# Patient Record
Sex: MALE | Race: WHITE | NOT HISPANIC OR LATINO | Employment: OTHER | URBAN - METROPOLITAN AREA
[De-identification: names, ages, dates, MRNs, and addresses within clinical notes are randomized per-mention and may not be internally consistent; named-entity substitution may affect disease eponyms.]

---

## 2017-04-07 ENCOUNTER — GENERIC CONVERSION - ENCOUNTER (OUTPATIENT)
Dept: OTHER | Facility: OTHER | Age: 72
End: 2017-04-07

## 2017-04-21 ENCOUNTER — ALLSCRIPTS OFFICE VISIT (OUTPATIENT)
Dept: OTHER | Facility: OTHER | Age: 72
End: 2017-04-21

## 2017-04-21 DIAGNOSIS — M54.9 DORSALGIA: ICD-10-CM

## 2017-04-21 DIAGNOSIS — E78.00 PURE HYPERCHOLESTEROLEMIA: ICD-10-CM

## 2017-06-15 ENCOUNTER — ALLSCRIPTS OFFICE VISIT (OUTPATIENT)
Dept: OTHER | Facility: OTHER | Age: 72
End: 2017-06-15

## 2017-06-15 DIAGNOSIS — M41.9 SCOLIOSIS: ICD-10-CM

## 2017-06-15 DIAGNOSIS — N40.0 ENLARGED PROSTATE WITHOUT LOWER URINARY TRACT SYMPTOMS (LUTS): ICD-10-CM

## 2017-06-15 DIAGNOSIS — E78.00 PURE HYPERCHOLESTEROLEMIA: ICD-10-CM

## 2017-12-05 ENCOUNTER — ALLSCRIPTS OFFICE VISIT (OUTPATIENT)
Dept: OTHER | Facility: OTHER | Age: 72
End: 2017-12-05

## 2017-12-05 DIAGNOSIS — R73.09 OTHER ABNORMAL GLUCOSE: ICD-10-CM

## 2017-12-05 DIAGNOSIS — E78.00 PURE HYPERCHOLESTEROLEMIA: ICD-10-CM
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
## 2017-12-06 NOTE — PROGRESS NOTES
Assessment    1  Benign essential HTN (401 1) (I10)   2  Hypercholesterolemia (272 0) (E78 00)   3  Gastroesophageal reflux disease without esophagitis (530 81) (K21 9)   4  Tick bite, initial encounter (919 4,E906 4) (W57 XXXA)   5  Abnormal glucose (790 29) (R73 09)   6  JUAN (acute kidney injury) (584 9) (N17 9)   7  BPH (benign prostatic hyperplasia) (600 00) (N40 0)    Plan  Abnormal glucose    · (1) COMPREHENSIVE METABOLIC PANEL; Status:Active; Requested for:83Uoh6791;    · (1) HEMOGLOBIN A1C; Status:Active; Requested for:59Yge8012;   BPH (benign prostatic hyperplasia)    · Tamsulosin HCl - 0 4 MG Oral Capsule; 1 tablet at bedtime  Hypercholesterolemia    · Carvedilol 25 MG Oral Tablet; TAKE 1 TABLET TWICE DAILY,  WITH MORNING AND EVENINGMEAL   · (1) CK (CPK); Status:Active; Requested for:29Neh0860;    · (1) LIPID PANEL FASTING W DIRECT LDL REFLEX; Status:Active; Requested for:93Utl4056;    · Follow-up visit in 4 Months Evaluation and Treatment  Follow-up  Status: Hold For - Scheduling Requested for: 57VIL9992  PMH: Abdominal bloating, PMH: History of abdominal pain    · Pantoprazole Sodium 40 MG Oral Tablet Delayed Release (Protonix); TAKE 1 TABLET(S) BYMOUTH DAILY  Screening for neurological condition    · *VB - Fall Risk Assessment  (Dx Z13 89 Screen for Neurologic Disorder) ; every 1 year; [id number]; Next 04HKZ0072; Status:Active   · Falls Risk Assessment (Dx Z13 89 Screen for Neurologic Disorder) ; every 1 year; Last 15Jun2017;Next 65UNV3960; Status:Active  Tick bite, initial encounter    · (1) LYME ANTIBODY PROFILE W/REFLEX TO WESTERN BLOT; Status:Active; Requestedfor:18Wey5460;     Discussion/Summary  Discussion Summary:   Increase water intake, start flomax for better urine flow  avoid ibuprofen- ok to use tylenol  check labs for lyme  repeat labs in 4months, discussed increased water intake  cont with cardiology and urology appt     Counseling Documentation With Imm: The patient was counseled regarding diagnostic results  Chief Complaint  Chief Complaint Free Text Note Form: Patient is here for a 6 m f/u visit for hypertension, hyperlipidemia and GERD  Review labs  Refill Pantoprazole  He c/o finding a tick under his left arm and the left side of his torso 3 weeks ago  He still has raised marks on his skin from the bites  He also c/o difficulty sleeping due to his arms falling asleep during the night and both knees ache  He has difficulty straightening up in the morning, back is stiff  Chief Complaint Chronic Condition [facility]: Patient is here today for follow up of chronic conditions described in HPI  History of Present Illness  Hyperlipidemia (Follow-Up): The patient states his hyperlipidemia has been stable since the last visit  Comorbid Illnesses: hypertension  He has no significant interval events  Symptoms: The patient is currently asymptomatic  Associated symptoms include no focal neurologic deficits  Medications: The patient is not currently on any medications for his hyperlipidemia  The patient is not doing well with his hyperlipidemia goals  the patient's LDL goal is 80 mg/dL  The patient is due for a lipid panel  Hypertension (Follow-Up): The patient presents for follow-up of essential hypertension  The patient states he has been doing well with his blood pressure control since the last visit  He has no comorbid illnesses  He has no significant interval events  Symptoms: The patient is currently asymptomatic  Associated symptoms include no headache  Medications: The patient is not currently on any medications for his hypertension--lifestyle modification only  Disease Management: the patient is not doing well with his blood pressure goals  Coronary Artery Disease (Follow-Up): The patient states he has been doing well with his coronary artery disease symptoms since the last visit  Comorbid Illnesses: hypertension   Complications: MI    Symptoms: The patient is currently asymptomatic  His symptoms do not limit his activities  He is able to walk about 2 to 3 flights of stairs without symptoms  He denies nitroglycerin use since the last visit  Medications: The patient is not currently on any medications for his coronary artery disease  Review of Systems  Complete-Male:  Constitutional: recent 7 lb weight loss, but-- no fever,-- no recent weight gain,-- no chills-- and-- not feeling tired  Eyes: no eyesight problems  ENT: allergies, but-- no nasal discharge  Cardiovascular: no chest pain,-- no palpitations-- and-- no extremity edema  Respiratory: no cough-- and-- no shortness of breath during exertion  Gastrointestinal: reflux, but-- as noted in HPI-- and-- no abdominal pain  Genitourinary: no dysuria-- and-- no incontinence  Musculoskeletal: arthralgias-- and-- myalgias, but-- as noted in HPI  Neurological: no headache-- and-- no dizziness  Psychiatric: sleep disturbances-- and-- recent frequently awakenings, but-- as noted in HPI  Active Problems  1  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 (414 05) (I25 810)   2  Benign essential HTN (401 1) (I10)   3  BPH (benign prostatic hyperplasia) (600 00) (N40 0)   4  Depression (311) (F32 9)   5  Encounter for hepatitis C screening test for low risk patient (V73 89) (Z11 59)   6  Gastroesophageal reflux disease without esophagitis (530 81) (K21 9)   7  Gout (274 9) (M10 9)   8  Hypercholesterolemia (272 0) (E78 00)   9  Lumbar spondylosis (721 3) (M47 816)   10  Scoliosis (737 30) (M41 9)   11  Screening for genitourinary condition (V81 6) (Z13 89)   12  Screening for neurological condition (V80 09) (Z13 89)    Past Medical History  1  History of Abdominal bloating (787 3) (R14 0)   2  History of Acute back pain (724 5) (M54 9)   3  History of Basal cell carcinoma of back (173 51) (C44 519)   4   History of Basal cell carcinoma of skin of upper extremity, including shoulder (173 61) (C44 611)   5  History of BCC (basal cell carcinoma), trunk (173 51) (C44 519)   6  History of BCE (basal cell epithelioma), trunk (173 51) (C44 519)   7  History of Benign neoplasm of skin of face (216 3) (D23 30)   8  H/O nonmelanoma skin cancer (V10 83) (Z85 828)   9  History of abdominal pain (V13 89) (Z87 898)   10  History of actinic keratosis (V13 3) (Z87 2)   11  History of acute gouty arthritis (V13 4) (Z87 39)   12  History of neoplasm of uncertain behavior of skin (V13 3) (Z86 03)   13  History of seborrheic keratosis (V13 3) (Z87 2)   14  History of Inflamed seborrheic keratosis (702 11) (L82 0)   15  History of Skin lesion (709 9) (L98 9)    Surgical History  1  History of Anal Fistulotomy (Subcutaneous)   2  History of Coronary Artery Triple Arterial Bypass Graft   3  History of Hemorrhoidectomy   4  History of Reported Hx Of Shoulder Joint Replacement    Family History  Mother    1  Family history of Brain tumor    Social History     · Never A Smoker   · No illicit drug use   · Social alcohol use (Z78 9)  Social History Reviewed: The social history was reviewed and updated today  The social history was reviewed and is unchanged  Current Meds   1  Aspirin 325 MG Oral Tablet; take 1 tablet every other day; Therapy: (Recorded:06Jan2016) to Recorded   2  Carvedilol 25 MG Oral Tablet; TAKE 1 TABLET TWICE DAILY,  WITH MORNING AND EVENING MEAL; Therapy: 65NSO5257 to (Evaluate:80Ieu8400)  Requested for: 23QHM2053; Last Rx:15Jun2017 Ordered   3  Pantoprazole Sodium 40 MG Oral Tablet Delayed Release; TAKE 1 TABLET(S) BY MOUTH DAILY; Therapy: 11QHH1533 to (Last Rx:15Jun2017)  Requested for: 77KNK6863 Ordered   4  Rosuvastatin Calcium 10 MG Oral Tablet; TAKE ONE TABLET BY MOUTH EVERY DAY; Therapy: 15Nwb0057 to (Katheryn Hernandez)  Requested for: 20Nov2017; Last Rx:20Nov2017 Ordered  Medication List Reviewed: The medication list was reviewed and updated today  Allergies  1  No Known Drug Allergies  2  No Known Environmental Allergies   3  No Known Food Allergies    Vitals  Vital Signs    Recorded: 52WCH1631 02:01PM   Temperature 96 8 F, Tympanic   Heart Rate 53   Systolic 405, LUE, Sitting   Diastolic 90, LUE, Sitting   Height 5 ft 2 36 in   Weight 191 lb 6 oz   BMI Calculated 34 6   BSA Calculated 1 88   O2 Saturation 97       Physical Exam   Constitutional  General appearance: No acute distress, well appearing and well nourished  Eyes  Conjunctiva and lids: No swelling, erythema, or discharge  Pupils and irises: Equal, round and reactive to light  Ears, Nose, Mouth, and Throat  External inspection of ears and nose: Normal    Oropharynx: Normal with no erythema, edema, exudate or lesions  Pulmonary  Respiratory effort: No increased work of breathing or signs of respiratory distress  Auscultation of lungs: Clear to auscultation, equal breath sounds bilaterally, no wheezes, no rales, no rhonci  Cardiovascular  Auscultation of heart: Normal rate and rhythm, normal S1 and S2, without murmurs  Examination of extremities for edema and/or varicosities: Normal    Carotid pulses: Normal    Abdomen  Abdomen: Non-tender, no masses  Liver and spleen: No hepatomegaly or splenomegaly  Musculoskeletal  Gait and station: Normal    Inspection/palpation of joints, bones, and muscles: Abnormal  -- lumbar paraspinal hypertonicity  no bony pain  Skin  Skin and subcutaneous tissue: Normal without rashes or lesions  Neurologic  Cranial nerves: Cranial nerves 2-12 intact  Reflexes: 2+ and symmetric  Sensation: No sensory loss     Psychiatric  Orientation to person, place and time: Normal    Mood and affect: Normal          Results/Data  (1) COMPREHENSIVE METABOLIC PANEL 42KAR3071 33:68JV Florecita Burris     Test Name Result Flag Reference   GLUCOSE,RANDM 117 A        Summary / No summary entered :    No summary entered  Documents attached :    [address] Work - [facility], team; Enc: [id number] - Image Encounter - [facility], team -    (Interventional Medicine) (Additional Information Document)  Health Management  Depression screening   PHevery-9 Adult Depression Screening; every 1 year; Last 33VLK6541; Next Due: 23Pov9120; Active  History of Colon cancer screening   COLONOSCOPY; every 5 years; Last 45Wnm3699; Next Due: 03Taz2535; Active  Screening for genitourinary condition   *VB - Urinary Incontinence Screen (Dx Z13 89 Screen for UI); every 1 year; Last 67NYY1015; NextDue: 70AIA2374; Active  Screening for neurological condition   *VB - Fall Risk Assessment  (Dx Z13 89 Screen for Neurologic Disorder); every 1 year; DUQT74VEX1134; Next Due: 01BOS2268; Active  Falls Risk Assessment (Dx Z13 89 Screen for Neurologic Disorder); every 1 year; Last 07KZE5078;[facility] Due: 06RCU7026; Active  Health Maintenance   Medicare Annual Wellness Visit; every 1 year; Last 50ECY8817; Next Due: 18EGL5545;  Overdue    Signatures   Electronically signed by : Mauricio Harley DO; Dec  5 2017  2:32PM EST                       (Author)

## 2018-01-13 VITALS
SYSTOLIC BLOOD PRESSURE: 158 MMHG | WEIGHT: 198.25 LBS | BODY MASS INDEX: 35.12 KG/M2 | OXYGEN SATURATION: 97 % | HEIGHT: 63 IN | HEART RATE: 58 BPM | DIASTOLIC BLOOD PRESSURE: 90 MMHG | TEMPERATURE: 98.2 F

## 2018-01-14 VITALS — DIASTOLIC BLOOD PRESSURE: 66 MMHG | HEIGHT: 63 IN | SYSTOLIC BLOOD PRESSURE: 130 MMHG

## 2018-01-19 ENCOUNTER — ALLSCRIPTS OFFICE VISIT (OUTPATIENT)
Dept: OTHER | Facility: OTHER | Age: 73
End: 2018-01-19

## 2018-01-19 DIAGNOSIS — I10 ESSENTIAL (PRIMARY) HYPERTENSION: ICD-10-CM

## 2018-01-19 DIAGNOSIS — E78.00 PURE HYPERCHOLESTEROLEMIA: ICD-10-CM

## 2018-01-19 DIAGNOSIS — I25.810 ATHEROSCLEROSIS OF CORONARY ARTERY BYPASS GRAFT WITHOUT ANGINA PECTORIS: ICD-10-CM

## 2018-01-20 NOTE — PROGRESS NOTES
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, Benign    essential HTN, Hypercholesterolemia    · VAS SCREENING; Status:Hold For - Scheduling; Requested for:19Jan2018; Perform:[facility]; XHL:37GZM8598;MFVBBHZ; For: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, Benign essential HTN, Hypercholesterolemia; Ordered By:Jake Gottlieb;   · Follow-up visit in 6 months Evaluation and Treatment  Follow-up  Status: Hold For -    Scheduling  Requested for: 85VUL4944   Ordered; For: 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, Benign essential HTN, Hypercholesterolemia; Ordered By: David Maguire Performed:  Due: 86WZB3005    Discussion/Summary   Cardiology Discussion Summary Free Text Note Form [facility]:    Coronary artery disease is stable with no complaints of angina  Blood pressure is elevated I have asked him to take his carvedilol twice a day and follow the pressure closely  It does not improved can add amlodipine 5 mg daily  Cholesterol numbers will be checked this year  Continue current dose of rosuvastatin I have encouraged him to again take these pills on a daily basis  I have ordered a vascular screening of see him back in 6 months  Chief Complaint   Chief Complaint Free Text Note Form: Patient is here for a yearly follow up  Patient has no cardiac complaint  History of Present Illness   Cardiology HPI Free Text Note Form [facility]: Mr Wil Rosado a very pleasant 73-year-old gentleman with a history of coronary artery disease status post CABG Ã3 10 years ago presents today for a follow-up visit  From a functional standpoint the patient does quite well   He works at [employer] 4 days a week and walks on the treadmill for a couple of miles  He declines any exertional chest pressure heaviness  He states his breathing has been quite good and offers no complaints of palpitations  He unfortunately has a hard time sleeping at night states that he gets him multiple times but declines any PND orthopnea  He stopped taking his Crestor around December 2 to cost  I explained to him today in the office that he recently went generic and he'll be able to get back on that  His LDL cholesterol did rise about 60 0 6 while off of the medication  presents today for follow-up visit  I have not seen him since 2016  he states he has been doing well and doing some exercise and working with no significant limitations  There has been no chest pain, shortness of breath, palpitations, lightheadedness, dizziness, or syncope  He has been taking his cholesterol medicine most days of the week and numbers have significantly improved based on blood work from last year  He tries to follow a low-sodium diet  Blood pressures been a little on the high side  He admits to not taking his carvedilol twice a day and has a hard time remembering to take pills  Review of Systems   Cardiology Male ROS:         Cardiac: No complaints of chest pain, no palpitations, no fainiting  Skin: No complaints of nonhealing sores or skin rash  Genitourinary: No complaints of recurrent urinary tract infections, frequent urination at night, difficult urination, blood in urine, kidney stones, loss of bladder control, no kidney or prostate problems, no erectile dysfunction  Psychological: No complaints of feeling depressed, anxiety, panic attacks, or difficulty concentrating  General: No complaints of trouble sleeping, lack of energy, fatigue, appetite changes, weight changes, fever, frequent infections, or night sweats  Respiratory: No complaints of shortness of breath, cough with sputum, or wheezing        HEENT: No complaints of serious problems, hearing problems, nose problems, throat problems, or snoring  Gastrointestinal: No complaints of liver problems, nausea, vomiting, heartburn, constipation, bloody stools, diarrhea, problems swallowing, adbominal pain, or rectal bleeding  Hematologic: No complaints of bleeding disorders, anemia, blood clots, or excessive brusing  Neurological: No complaints of numbness, tingling, dizziness, weakness, seizures, headaches, syncope or fainting, AM fatigue, daytime sleepiness, no witnessed apnea episodes  Musculoskeletal: No complaints of arthritis, back pain, or painfull swelling  Active Problems   Problems    1  Abnormal glucose (790 29) (R73 09)   2  JUAN (acute kidney injury) (584 9) (N17 9)   3  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 (414 05)     (I25 810)   4  Benign essential HTN (401 1) (I10)   5  BPH (benign prostatic hyperplasia) (600 00) (N40 0)   6  Depression (311) (F32 9)   7  Encounter for hepatitis C screening test for low risk patient (V73 89) (Z11 59)   8  Gastroesophageal reflux disease without esophagitis (530 81) (K21 9)   9  Gout (274 9) (M10 9)   10  Hypercholesterolemia (272 0) (E78 00)   11  Lumbar spondylosis (721 3) (M47 816)   12  Scoliosis (737 30) (M41 9)   13  Screening for genitourinary condition (V81 6) (Z13 89)   14  Screening for neurological condition (V80 09) (Z13 89)   15  Tick bite, initial encounter (919 4,E906 4) [facility])    Past Medical History   Problems    1  History of Abdominal bloating (787 3) (R14 0)   2  History of Acute back pain (724 5) (M54 9)   3  History of Basal cell carcinoma of back (173 51) (C44 519)   4  History of Basal cell carcinoma of skin of upper extremity, including shoulder (173 61)     (C44 611)   5  History of BCC (basal cell carcinoma), trunk (173 51) (C44 519)   6  History of BCE (basal cell epithelioma), trunk (173 51) (C44 519)   7   History of Benign neoplasm of skin of face (216 3) (D23 30)   8  H/O nonmelanoma skin cancer (V10 83) (Z85 828)   9  History of abdominal pain (V13 89) (Z87 898)   10  History of actinic keratosis (V13 3) (Z87 2)   11  History of acute gouty arthritis (V13 4) (Z87 39)   12  History of neoplasm of uncertain behavior of skin (V13 3) (Z86 03)   13  History of seborrheic keratosis (V13 3) (Z87 2)   14  History of Inflamed seborrheic keratosis (702 11) (L82 0)   15  History of Skin lesion (709 9) (L98 9)  Active Problems And Past Medical History Reviewed: The active problems and past medical history were reviewed and updated today  Surgical History   Problems    1  History of Anal Fistulotomy (Subcutaneous)   2  History of Coronary Artery Triple Arterial Bypass Graft   3  History of Hemorrhoidectomy   4  History of Reported Hx Of Shoulder Joint Replacement  Surgical History Reviewed: The surgical history was reviewed and updated today  Family History   Mother    1  Family history of Brain tumor  Family History Reviewed: The family history was reviewed and updated today  Social History   Problems    · Never A Smoker   · No illicit drug use   · Social alcohol use (Z78 9)  Social History Reviewed: The social history was reviewed and updated today  Current Meds    1  Aspirin 325 MG Oral Tablet; take 1 tablet every other day; Therapy: (Recorded:06Jan2016) to Recorded   2  Carvedilol 25 MG Oral Tablet; TAKE 1 TABLET TWICE DAILY,  WITH MORNING AND     EVENING MEAL; Therapy: 38GQF4878 to (Evaluate:03Jun2018)  Requested for: 90JYZ1320; Last     Rx:22Iat7430 Ordered   3  Pantoprazole Sodium 40 MG Oral Tablet Delayed Release; TAKE 1 TABLET(S) BY     MOUTH DAILY; Therapy: 62MQY1831 to (Last Rx:41Cyg3222)  Requested for: 94XHZ5669 Ordered   4  Rosuvastatin Calcium 10 MG Oral Tablet; TAKE ONE TABLET BY MOUTH EVERY DAY; Therapy: 38ZWR6834 to (Evaluate:66Xsk4600)  Requested for: 97XMX6175; Last     Rx:41Txq7294 Ordered   5  Tamsulosin HCl - 0 4 MG Oral Capsule; 1 tablet at bedtime; Therapy: 47MOJ8853 to (Last Rx:76Uqx0320)  Requested for: 21Fmj3366 Ordered    Allergies   Medication    1  No Known Drug Allergies  Non-Medication    2  No Known Environmental Allergies   3  No Known Food Allergies    Vitals   Vital Signs    Recorded: 26XAK0747 10:24AM Recorded: 93KAJ1336 09:58AM   Heart Rate  51   Systolic 337 936, RUE, Sitting   Diastolic 86 90, RUE, Sitting   Height  5 ft 2 in   Weight  198 lb    BMI Calculated  36 21   BSA Calculated  1 9     Physical Exam        Constitutional      General appearance: No acute distress, well appearing and well nourished  Eyes      Conjunctiva and Sclera examination: Conjunctiva pink, sclera anicteric  Ears, Nose, Mouth, and Throat - Oropharynx: Clear, nares are clear, mucous membranes are moist       Neck      Neck and thyroid: Normal, supple, trachea midline, no thyromegaly  Pulmonary      Respiratory effort: No increased work of breathing or signs of respiratory distress  Auscultation of lungs: Clear to auscultation, no rales, no rhonchi, no wheezing, good air movement  Cardiovascular      Auscultation of heart: Normal rate and rhythm, normal S1 and S2, no murmurs  Carotid pulses: Normal, 2+ bilaterally  Peripheral vascular exam: Normal pulses throughout, no tenderness, erythema or swelling  Pedal pulses: Normal, 2+ bilaterally  Examination of extremities for edema and/or varicosities: Normal        Abdomen      Abdomen: Non-tender and no distention  Liver and spleen: No hepatomegaly or splenomegaly  Musculoskeletal Gait and station: Normal gait  -- Digits and nails: Normal without clubbing or cyanosis  -- Inspection/palpation of joints, bones, and muscles: Normal, ROM normal        Skin - Skin and subcutaneous tissue: Normal without rashes or lesions  Skin is warm and well perfused, normal turgor         Neurologic - Cranial nerves: II - XII intact  -- Speech: Normal        Psychiatric - Orientation to person, place, and time: Normal -- Mood and affect: Normal       Results/Data   ECG Report: Normal sinus rhythm inferior infarct age indeterminate nonspecific T-wave changes      Health Management   Depression screening   PHevery-9 Adult Depression Screening; every 1 year; Last 56MQU8409; Next Due: 15Jun2018; Active  History of Colon cancer screening   COLONOSCOPY; every 5 years; Last 84Lzz1448; Next Due: 76You0076; Active  Screening for genitourinary condition   *VB - Urinary Incontinence Screen (Dx Z13 89 Screen for UI); every 1 year; Last 85RRS0013; Next    Due: 15Jun2018; Active  Screening for neurological condition   *VB - Fall Risk Assessment  (Dx Z13 89 Screen for Neurologic Disorder); every 1 year; Last    28SZV9207; Next Due: 86LZH8985; Active  Falls Risk Assessment (Dx Z13 89 Screen for Neurologic Disorder); every 1 year; Last 89DOE7046;    Next Due: 15Jun2018; Active  Health Maintenance   Medicare Annual Wellness Visit; every 1 year; Last 17CWD7667; Next Due: 06JYX7935;  Overdue    Future Appointments      Date/Time Provider Specialty Site   04/10/2018 10:15 AM Petra Joy DO Internal Medicine [facility]     Signatures    Electronically signed by : Owen Tillman DO; Jan 19 2018 10:25AM EST                       (Author)

## 2018-01-23 VITALS
HEIGHT: 62 IN | BODY MASS INDEX: 36.44 KG/M2 | DIASTOLIC BLOOD PRESSURE: 86 MMHG | WEIGHT: 198 LBS | HEART RATE: 51 BPM | SYSTOLIC BLOOD PRESSURE: 146 MMHG

## 2018-01-23 VITALS
OXYGEN SATURATION: 97 % | TEMPERATURE: 96.8 F | WEIGHT: 191.38 LBS | HEIGHT: 62 IN | BODY MASS INDEX: 35.22 KG/M2 | SYSTOLIC BLOOD PRESSURE: 142 MMHG | DIASTOLIC BLOOD PRESSURE: 90 MMHG | HEART RATE: 53 BPM

## 2018-01-26 ENCOUNTER — HOSPITAL ENCOUNTER (OUTPATIENT)
Dept: NON INVASIVE DIAGNOSTICS | Facility: CLINIC | Age: 73
Discharge: HOME | End: 2018-01-26
Payer: COMMERCIAL

## 2018-01-26 DIAGNOSIS — I25.810 ATHEROSCLEROSIS OF CORONARY ARTERY BYPASS GRAFT WITHOUT ANGINA PECTORIS: ICD-10-CM

## 2018-01-26 DIAGNOSIS — I10 ESSENTIAL (PRIMARY) HYPERTENSION: ICD-10-CM

## 2018-01-26 DIAGNOSIS — E78.00 PURE HYPERCHOLESTEROLEMIA: ICD-10-CM

## 2018-01-26 PROCEDURE — 93880 EXTRACRANIAL BILAT STUDY: CPT | Performed by: SURGERY

## 2018-01-26 PROCEDURE — 93922 UPR/L XTREMITY ART 2 LEVELS: CPT | Performed by: SURGERY

## 2018-01-26 PROCEDURE — 93979 VASCULAR STUDY: CPT | Performed by: SURGERY

## 2018-02-27 VITALS
BODY MASS INDEX: 35.44 KG/M2 | HEART RATE: 74 BPM | WEIGHT: 200 LBS | DIASTOLIC BLOOD PRESSURE: 96 MMHG | HEIGHT: 63 IN | SYSTOLIC BLOOD PRESSURE: 142 MMHG

## 2018-02-27 DIAGNOSIS — R53.83 FATIGUE, UNSPECIFIED TYPE: ICD-10-CM

## 2018-02-27 DIAGNOSIS — M25.50 POLYARTHRALGIA: ICD-10-CM

## 2018-02-27 DIAGNOSIS — M79.10 MYALGIA: Primary | ICD-10-CM

## 2018-02-27 PROCEDURE — 99203 OFFICE O/P NEW LOW 30 MIN: CPT | Performed by: INTERNAL MEDICINE

## 2018-02-27 RX ORDER — PANTOPRAZOLE SODIUM 40 MG/1
1 TABLET, DELAYED RELEASE ORAL DAILY
COMMUNITY
Start: 2016-06-09 | End: 2018-10-16

## 2018-02-27 RX ORDER — CARVEDILOL 25 MG/1
1 TABLET ORAL 2 TIMES DAILY
COMMUNITY
Start: 2015-07-17 | End: 2018-09-06 | Stop reason: SDUPTHER

## 2018-02-27 RX ORDER — TAMSULOSIN HYDROCHLORIDE 0.4 MG/1
CAPSULE ORAL
Refills: 0 | COMMUNITY
Start: 2018-02-16 | End: 2018-10-16

## 2018-02-28 NOTE — PROGRESS NOTES
Assessment/Plan:  Assessment/Plan   Diagnoses and all orders for this visit:    Myalgia  -     Lyme Antibody Profile with reflex to WB; Future  -     Vitamin D 1,25 dihydroxy; Future  -     RF Screen w/ Reflex to Titer; Future  -     Sedimentation rate, automated; Future  -     C-reactive protein; Future  -     Creatine Kinase, Total; Future    Polyarthralgia  -     Lyme Antibody Profile with reflex to WB; Future  -     Vitamin D 1,25 dihydroxy; Future  -     RF Screen w/ Reflex to Titer; Future  -     Sedimentation rate, automated; Future  -     C-reactive protein; Future  -     Creatine Kinase, Total; Future    Fatigue, unspecified type  -     Lyme Antibody Profile with reflex to WB; Future  -     Vitamin D 1,25 dihydroxy; Future  -     RF Screen w/ Reflex to Titer; Future  -     Sedimentation rate, automated; Future  -     C-reactive protein; Future  -     Creatine Kinase, Total; Future      Although Mr Usha Weinstein has a history of osteoarthritis, his current clinical presentation is nonfocal   He with the presents with myalgia and polyarthralgia  Although he had a Lyme titer workup done in December him a patient did indicate that this was done within a week after his exposure to tick bite  I have ordered a repeat lyme titer because it has been well documented that Antibody seroconversion status post primarily but to further infection can be slow and may take 2-6 weeks  Have also ordered some other basic rheumatologic and inflammatory lab work to rule out inflammatory process  Furthermore, given his history of starting use, I have also ordered serum CK level  He'll follow up for reevaluation once the labs are completed  Patient was advised to call and return to the clinic sooner or go to the closest emergency room if he develops any symptom exacerbation  This document was recorded using voice recognition software and errors may be noted  Subjective:   Patient ID: Ghazala Dandy is a 74 y o  male     HPI    Mr Mya Thapa is a 67-year-old male who presents for initial evaluation of Generalized muscle aches/pain, multiple joint pain and joint stiffness Including his bilateral knees, upper extremities, back, neck, etc  She reports that his symptoms started last year  He also has history of be started on statin last year as well as history of tick bite last year  With regards to the tick bite, he is PMD reportedly did a Lyme titer within a week after patient was bitten by the tick  I reviewed his labs and the lyme titer done in 12/2017 is normal   Patient also has a known history of osteoarthritis  However, he states that the symptoms that different from his usual arthritis related symptoms  He denies any numbness or tingling  The following portions of the patient's history were reviewed and updated as appropriate: allergies, current medications, past family history, past medical history, past social history, past surgical history and problem list     Review of Systems   Constitutional: Negative  HENT: Negative  Eyes: Negative  Respiratory: Negative  Gastrointestinal: Negative for abdominal distention, abdominal pain and anal bleeding  Genitourinary: Negative for dysuria  Musculoskeletal:        As per history of present illness   Skin: Negative  Neurological: Negative  Objective:  Right Knee Exam     Comments:  No knee swelling or effusion noted  Slight bilateral joint hypertrophy noted  Posteromedial joint line noted bilaterally  Medial joint line noted bilaterally  Left is very minimally larger than the right calf  There is no palpable cords  Bilateral knee range of motion is significant for some pain production  Palpable mild myalgia noted both in the bilateral Thighs and upper extremity  Back Exam     Tenderness   The patient is experiencing no tenderness  Range of Motion   Back extension: Subjective stiffness with traumatic extension     Back flexion: End-stage forward flexion stiffness  Lateral Bend Right: normal   Lateral Bend Left: normal   Rotation Right: normal             Physical Exam   Constitutional: He is oriented to person, place, and time  He appears well-developed and well-nourished  HENT:   Head: Normocephalic and atraumatic  Eyes: Conjunctivae are normal    Neurological: He is alert and oriented to person, place, and time  Skin: Skin is warm and dry  Psychiatric: He has a normal mood and affect

## 2018-03-05 LAB
1,25(OH)2D3 SERPL-MCNC: 29.9 PG/ML (ref 19.9–79.3)
25(OH)D3+25(OH)D2 SERPL-MCNC: 21.4 NG/ML (ref 30–100)
B BURGDOR IGG+IGM SER-ACNC: <0.91 ISR (ref 0–0.9)
B BURGDOR IGM SER IA-ACNC: <0.8 INDEX (ref 0–0.79)
CK SERPL-CCNC: 63 U/L (ref 24–204)
CRP SERPL-MCNC: 1 MG/L (ref 0–4.9)
ERYTHROCYTE [SEDIMENTATION RATE] IN BLOOD BY WESTERGREN METHOD: 6 MM/HR (ref 0–30)
RHEUMATOID FACT SERPL-ACNC: <10 IU/ML (ref 0–13.9)

## 2018-03-14 ENCOUNTER — OFFICE VISIT (OUTPATIENT)
Dept: OBGYN CLINIC | Facility: OTHER | Age: 73
End: 2018-03-14
Payer: COMMERCIAL

## 2018-03-14 VITALS
BODY MASS INDEX: 36.44 KG/M2 | SYSTOLIC BLOOD PRESSURE: 116 MMHG | HEIGHT: 62 IN | WEIGHT: 198 LBS | DIASTOLIC BLOOD PRESSURE: 113 MMHG | HEART RATE: 62 BPM

## 2018-03-14 DIAGNOSIS — R53.83 OTHER FATIGUE: ICD-10-CM

## 2018-03-14 DIAGNOSIS — M25.50 POLYARTHRALGIA: ICD-10-CM

## 2018-03-14 DIAGNOSIS — R79.89 LOW VITAMIN D LEVEL: Primary | ICD-10-CM

## 2018-03-14 DIAGNOSIS — M79.10 MYALGIA: ICD-10-CM

## 2018-03-14 PROCEDURE — 99213 OFFICE O/P EST LOW 20 MIN: CPT | Performed by: INTERNAL MEDICINE

## 2018-03-14 RX ORDER — NAPROXEN 500 MG/1
500 TABLET ORAL 2 TIMES DAILY WITH MEALS
Qty: 30 TABLET | Refills: 0 | Status: SHIPPED | OUTPATIENT
Start: 2018-03-14 | End: 2018-04-12 | Stop reason: SDUPTHER

## 2018-03-14 RX ORDER — PREDNISONE 20 MG/1
20 TABLET ORAL DAILY
Qty: 6 TABLET | Refills: 0 | Status: SHIPPED | OUTPATIENT
Start: 2018-03-14 | End: 2018-05-10

## 2018-03-14 RX ORDER — ERGOCALCIFEROL 1.25 MG/1
50000 CAPSULE ORAL WEEKLY
Qty: 8 CAPSULE | Refills: 0 | Status: SHIPPED | OUTPATIENT
Start: 2018-03-14 | End: 2018-05-10

## 2018-03-14 NOTE — PROGRESS NOTES
Assessment/Plan:  Assessment/Plan   Diagnoses and all orders for this visit:    Low vitamin D level  -     ergocalciferol (VITAMIN D2) 50,000 units; Take 1 capsule (50,000 Units total) by mouth once a week    Polyarthralgia  -     naproxen (NAPROSYN) 500 mg tablet; Take 1 tablet (500 mg total) by mouth 2 (two) times a day with meals x7-10 days then decrease to 1 tab p o bid prn  With food thereafter for any residual pain  -     predniSONE 20 mg tablet; Take 1 tablet (20 mg total) by mouth daily x5 days then decrease to 1/2 tab on day 6 & 7    Myalgia  -     predniSONE 20 mg tablet; Take 1 tablet (20 mg total) by mouth daily x5 days then decrease to 1/2 tab on day 6 & 7    Other fatigue        Focal examination of his knee is significant for left knee joint line tenderness  He does have some mild crepitus  Otherwise, no other pertinent positive findings  His low back examination is significant for some discomfort with trunk extension and lateral flexion  Otherwise, no other pertinent positive findings  His lab work is normal with the exception of serum vitamin D level of 21  Therefore, I have started him on vitamin-D replacement of 50,000 international units x8 weeks  He will transition to 5000 international units daily once he completes the 50,000 international units  I will start him on prednisone 20 mg p  O  Daily x5 days  He will take half tablet on day 6 and day 7  Naproxen 500 mg p  O  B i d  With food times 7-10 days and then decrease to p o  B i d  With food p r n  Thereafter  I have given him for a total of 2 weeks  If he continues to have symptoms after  Completing the above medications, he will need to contact his PMD who may need to refer him to rheumatology for further evaluation and diagnostic workup  Subjective:   Patient ID: Ana Grimm is a 74 y o  male  HPI      Mansfield Libman for follow-up re-evaluation of his polymyalgia and polyarthralgia    During our previous encounter, his complaint was nonfocal   I ordered multiple basic labs including CRP, ESR, rheumatoid factor, Lyme titers, vitamin-D level, and  Serum CK level which he has completed  It will be reviewed today  On today's presentation, he reports that he is still having pain in all of his joints in as well as his low back  His left knee pain sometimes travel to the lower lateral aspect of his knee  He reports when he wakes up in the morning he takes Salima Ricks a while to get going  He denies any numbness or tingling  His wife accompanied him to today's visit and was present throughout this encounter  Review of Systems   Review of Systems   Constitutional: Negative  HENT: Negative  Eyes: Negative  Respiratory: Negative  Cardiovascular: Negative  Musculoskeletal:        As per history of present illness   Skin: Negative  Neurological: Negative   Psychiatric/Behavioral: Negative  Objective:  Ortho Exam   Slight bilateral joint hypertrophy noted  Posteromedial joint line noted bilaterally  The rest of the bilateral knee examination is normal   Mild end-stage bilateral/and and extension stiffness  Physical Exam   Constitutional: Oriented to person, place, and time  Well-developed and well-nourished  HENT:   Head: Normocephalic and atraumatic  Eyes: Conjunctivae are normal    Cardiovascular: Normal rate  Pulmonary/Chest: Effort normal    Neurological: Alert and oriented to person, place, and time  Skin: Skin is warm and dry  Psychiatric: Normal mood and affect  Serum lab work including ESR, CRP, Lyme titer, and rheumatoid factor are normal   Serum vitamin D level is 21

## 2018-04-12 ENCOUNTER — OFFICE VISIT (OUTPATIENT)
Dept: INTERNAL MEDICINE CLINIC | Facility: CLINIC | Age: 73
End: 2018-04-12
Payer: COMMERCIAL

## 2018-04-12 VITALS
HEIGHT: 63 IN | BODY MASS INDEX: 35.01 KG/M2 | SYSTOLIC BLOOD PRESSURE: 130 MMHG | HEART RATE: 55 BPM | DIASTOLIC BLOOD PRESSURE: 70 MMHG | OXYGEN SATURATION: 96 % | WEIGHT: 197.6 LBS | TEMPERATURE: 98.2 F

## 2018-04-12 DIAGNOSIS — R79.89 LOW VITAMIN D LEVEL: ICD-10-CM

## 2018-04-12 DIAGNOSIS — M54.50 LUMBAR PAIN: Primary | ICD-10-CM

## 2018-04-12 DIAGNOSIS — M41.125 ADOLESCENT IDIOPATHIC SCOLIOSIS OF THORACOLUMBAR REGION: ICD-10-CM

## 2018-04-12 DIAGNOSIS — M25.50 POLYARTHRALGIA: ICD-10-CM

## 2018-04-12 PROCEDURE — 99213 OFFICE O/P EST LOW 20 MIN: CPT | Performed by: FAMILY MEDICINE

## 2018-04-12 RX ORDER — METHOCARBAMOL 500 MG/1
500 TABLET, FILM COATED ORAL
Qty: 30 TABLET | Refills: 3 | Status: SHIPPED | OUTPATIENT
Start: 2018-04-12 | End: 2018-10-18 | Stop reason: SDUPTHER

## 2018-04-12 RX ORDER — NAPROXEN 500 MG/1
500 TABLET ORAL 2 TIMES DAILY PRN
Qty: 60 TABLET | Refills: 3 | Status: SHIPPED | OUTPATIENT
Start: 2018-04-12 | End: 2018-05-10

## 2018-04-12 RX ORDER — ROSUVASTATIN CALCIUM 10 MG/1
TABLET, COATED ORAL
Refills: 1 | COMMUNITY
Start: 2018-04-07 | End: 2018-11-01 | Stop reason: SDUPTHER

## 2018-04-12 NOTE — PROGRESS NOTES
Assessment/Plan:    Lumbar pain  Heart prn, robaxin at night prn , naprosyn in am prn with food  Massage and stretching and possible chiropractor  xrays reviewed  tarun cause is scoliosis         Problem List Items Addressed This Visit        Musculoskeletal and Integument    Adolescent idiopathic scoliosis of thoracolumbar region       Other    Polyarthralgia    Relevant Medications    naproxen (NAPROSYN) 500 mg tablet    Low vitamin D level    Lumbar pain - Primary     Heart prn, robaxin at night prn , naprosyn in am prn with food  Massage and stretching and possible chiropractor  xrays reviewed  tarun cause is scoliosis         Relevant Medications    methocarbamol (ROBAXIN) 500 mg tablet    Other Relevant Orders    Ambulatory referral to Chiropractic            Subjective:      Patient ID: Clarence Carrasco is a 74 y o  male  HPI  Pain started a few months ago, known hx of scoliosis, saw specialist who RX prednisone and  NSAID and vit D  xrays noted  Worse now  Feels tightness, lowe thoracic and lumbar area  Takes him 2 hours in the morning to feel totally mobile, then the day is better  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 [place]   NSAIDS and prednisone helped, had labs done    The following portions of the patient's history were reviewed and updated as appropriate: allergies, current medications, past family history, past medical history, past social history, past surgical history and problem list     Review of Systems    Constitutional:  Denies fever or chills   Eyes:  Denies change in visual acuity   HENT:  Denies nasal congestion or sore throat   Respiratory:  Denies cough or shortness of breath or wheezing  Cardiovascular:  Denies palpitations or chest pain  GI:  Denies abdominal pain, nausea, or vomiting  Integument:  Denies rash   Neurologic:  Denies headache or focal weakness        Objective:      /70 (BP Location: Left arm, Patient Position: Sitting, Cuff Size: Standard)   Pulse 55   Temp 98 2 °F (36 8 °C) (Oral)   Ht 5' 3" (1 6 m)   Wt 89 6 kg (197 lb 9 6 oz)   SpO2 96%   BMI 35 00 kg/m²          Physical Exam    Constitutional:  Well developed, well nourished, no acute distress, non-toxic appearance   Eyes:  PERRL, conjunctiva normal , non icteric sclera  HENT:  Atraumatic, oropharynx moist  Neck-  supple   Respiratory:  CTA b/l, normal breath sounds, no rales, no wheezing   Cardiovascular:  RRR, no murmurs, no LE edema b/l  GI:  Soft, nondistended, normal bowel sounds x 4, nontender, no organomegaly, no mass, no rebound, no guarding   Neurologic:  no focal deficits noted   Psychiatric:  Speech and behavior appropriate , AAO x 3    MS; flattened lordotic curve, S  Scoliosis at thoraco lumbar area,  Hypertonicity located paraspinal hypertonicity ttp,   No bony pain

## 2018-04-12 NOTE — ASSESSMENT & PLAN NOTE
Heart prn, robaxin at night prn , naprosyn in am prn with food  Massage and stretching and possible chiropractor  xrays reviewed   tarun cause is scoliosis

## 2018-04-23 LAB
ALBUMIN SERPL-MCNC: 4.3 G/DL (ref 3.5–4.8)
ALBUMIN/GLOB SERPL: 2 {RATIO} (ref 1.2–2.2)
ALP SERPL-CCNC: 55 IU/L (ref 39–117)
ALT SERPL-CCNC: 20 IU/L (ref 0–44)
AMBIG ABBREV DEFAULT: NORMAL
AST SERPL-CCNC: 19 IU/L (ref 0–40)
BILIRUB SERPL-MCNC: 0.5 MG/DL (ref 0–1.2)
BUN SERPL-MCNC: 16 MG/DL (ref 8–27)
BUN/CREAT SERPL: 13 (ref 10–24)
CALCIUM SERPL-MCNC: 9.1 MG/DL (ref 8.6–10.2)
CHLORIDE SERPL-SCNC: 103 MMOL/L (ref 96–106)
CHOLEST SERPL-MCNC: 155 MG/DL (ref 100–199)
CK SERPL-CCNC: 63 U/L (ref 24–204)
CO2 SERPL-SCNC: 24 MMOL/L (ref 18–29)
CREAT SERPL-MCNC: 1.25 MG/DL (ref 0.76–1.27)
GLOBULIN SER-MCNC: 2.1 G/DL (ref 1.5–4.5)
GLUCOSE SERPL-MCNC: 116 MG/DL (ref 65–99)
HBA1C MFR BLD HPLC: 6.2 %
HBA1C MFR BLD: 6.2 % (ref 4.8–5.6)
HDLC SERPL-MCNC: 62 MG/DL
LDLC SERPL CALC-MCNC: 64 MG/DL (ref 0–99)
POTASSIUM SERPL-SCNC: 4.9 MMOL/L (ref 3.5–5.2)
PROT SERPL-MCNC: 6.4 G/DL (ref 6–8.5)
SL AMB EGFR AFRICAN AMERICAN: 65 ML/MIN/1.73
SL AMB EGFR NON AFRICAN AMERICAN: 56 ML/MIN/1.73
SODIUM SERPL-SCNC: 141 MMOL/L (ref 134–144)
TRIGL SERPL-MCNC: 147 MG/DL (ref 0–149)

## 2018-05-01 DIAGNOSIS — R79.89 LOW VITAMIN D LEVEL: ICD-10-CM

## 2018-05-01 RX ORDER — ERGOCALCIFEROL 1.25 MG/1
CAPSULE ORAL
Qty: 8 CAPSULE | Refills: 0 | OUTPATIENT
Start: 2018-05-01

## 2018-05-01 NOTE — TELEPHONE ENCOUNTER
Pleasant 77y/o male with vitamin D insufficiency treated with vitamin D replacement  He'll need to f/u with his PMD to determine if he need additional Vitamin D supplementation for his vitamin insufficiency

## 2018-05-10 ENCOUNTER — OFFICE VISIT (OUTPATIENT)
Dept: INTERNAL MEDICINE CLINIC | Facility: CLINIC | Age: 73
End: 2018-05-10
Payer: COMMERCIAL

## 2018-05-10 VITALS
TEMPERATURE: 97.9 F | OXYGEN SATURATION: 94 % | WEIGHT: 197.4 LBS | HEART RATE: 74 BPM | HEIGHT: 63 IN | DIASTOLIC BLOOD PRESSURE: 84 MMHG | SYSTOLIC BLOOD PRESSURE: 128 MMHG | BODY MASS INDEX: 34.98 KG/M2

## 2018-05-10 DIAGNOSIS — M41.125 ADOLESCENT IDIOPATHIC SCOLIOSIS OF THORACOLUMBAR REGION: ICD-10-CM

## 2018-05-10 DIAGNOSIS — Z12.5 SCREENING FOR MALIGNANT NEOPLASM OF PROSTATE: ICD-10-CM

## 2018-05-10 DIAGNOSIS — E78.00 HYPERCHOLESTEROLEMIA: ICD-10-CM

## 2018-05-10 DIAGNOSIS — K21.9 GASTROESOPHAGEAL REFLUX DISEASE WITHOUT ESOPHAGITIS: ICD-10-CM

## 2018-05-10 DIAGNOSIS — R73.09 ABNORMAL GLUCOSE: ICD-10-CM

## 2018-05-10 DIAGNOSIS — R79.89 LOW VITAMIN D LEVEL: ICD-10-CM

## 2018-05-10 DIAGNOSIS — M25.50 POLYARTHRALGIA: ICD-10-CM

## 2018-05-10 DIAGNOSIS — M54.50 LUMBAR PAIN: ICD-10-CM

## 2018-05-10 DIAGNOSIS — I10 BENIGN ESSENTIAL HTN: Primary | ICD-10-CM

## 2018-05-10 PROBLEM — M41.9 SCOLIOSIS: Status: ACTIVE | Noted: 2017-07-11

## 2018-05-10 PROCEDURE — 99214 OFFICE O/P EST MOD 30 MIN: CPT | Performed by: FAMILY MEDICINE

## 2018-05-10 NOTE — PROGRESS NOTES
Assessment     1  Benign essential HTN (401 1) (I10)   2  Hypercholesterolemia (272 0) (E78 00)   3  Gastroesophageal reflux disease without esophagitis (530 81) (K21 9)   5  Abnormal glucose (790 29) (R73 09)   7  BPH (benign prostatic hyperplasia) (600 00) (N40 0)  adolescent scoliosis   osteoarthritis   degenerative joint disease     Plan  Abnormal glucose    · (1) COMPREHENSIVE METABOLIC PANEL   · (1) HEMOGLOBIN A1C;;   BPH (benign prostatic hyperplasia)    ·: Tamsulosin HCl - 0 4 MG Oral Capsule; 1 tablet at bedtime  Hypercholesterolemia    · : Carvedilol 25 MG Oral Tablet; TAKE 1 TABLET TWICE DAILY,  WITH MORNING AND EVENINGMEAL   · (1) CK (CPK);    · (1) LIPID PANEL FASTING W DIRECT LDL REFLEX;    · Follow-up visit in 6 Months Evaluation and Treatment  Follow-up       Discussion/Summary  Discussion Summary:   Keep Increased water intake, flomax for better urine flow  avoid ibuprofen- ok to use tylenol  check labs for lyme  repeat labs in 6 months, discussed increased water intake  cont with cardiology and urology appt  Has appointment with Orthopedics for neck and back pain  P r n  Naprosyn and Robaxin which   He already has  Counseling Documentation With Imm: The patient was counseled regarding diagnostic results  Chief Complaint  Chief Complaint Free Text Note Form: Patient is here for a 6 m f/u visit for hypertension, hyperlipidemia and GERD  Review labs  Still with back stiffness and occasionally uses Robaxin  Has an appointment with Ortho in July  Much better than when seen 1 month ago where he had an exacerbation  Chief Complaint Chronic Condition [facility]: Patient is here today for follow up of chronic conditions described in HPI  History of Present Illness  Hyperlipidemia (Follow-Up): The patient states his hyperlipidemia has been stable since the last visit  Comorbid Illnesses: hypertension  He has no significant interval events  Symptoms: The patient is currently asymptomatic  Associated symptoms include no focal neurologic deficits  Medications: The patient is not currently on any medications for his hyperlipidemia  The patient is not doing well with his hyperlipidemia goals  the patient's LDL goal is 80 mg/dL  The patient is due for a lipid panel  Hypertension (Follow-Up): The patient presents for follow-up of essential hypertension  The patient states he has been doing well with his blood pressure control since the last visit  He has no comorbid illnesses  He has no significant interval events  Symptoms: The patient is currently asymptomatic  Associated symptoms include no headache  Medications: The patient is not currently on any medications for his hypertension--lifestyle modification only  Disease Management: the patient is not doing well with his blood pressure goals  Coronary Artery Disease (Follow-Up): The patient states he has been doing well with his coronary artery disease symptoms since the last visit  Comorbid Illnesses: hypertension  Complications: MI    Symptoms: The patient is currently asymptomatic  His symptoms do not limit his activities  He is able to walk about 2 to 3 flights of stairs without symptoms  He denies nitroglycerin use since the last visit  Medications: The patient is not currently on any medications for his coronary artery disease  Review of Systems  Complete-Male:  Constitutional:  no fever,-- no recent weight gain,-- no chills-- and-- not feeling tired  Eyes: no eyesight problems  ENT: allergies, but-- no nasal discharge  Cardiovascular: no chest pain,-- no palpitations-- and-- no extremity edema  Respiratory: no cough-- and-- no shortness of breath during exertion  Gastrointestinal: reflux, but-- as noted in HPI-- and-- no abdominal pain  Genitourinary: no dysuria-- and-- no incontinence  Musculoskeletal: arthralgias-- and-- myalgias, but-- as noted in HPI  Neurological: no headache-- and-- no dizziness    Psychiatric: sleep disturbances-- and-- recent frequently awakenings, but-- as noted in HPI  Vitals:    05/10/18 1421   BP: 128/84   Pulse: 74   Temp: 97 9 °F (36 6 °C)   SpO2: 94%     Vital Signs         Physical Exam   Constitutional  General appearance: No acute distress, well appearing and well nourished  Eyes  Conjunctiva and lids: No swelling, erythema, or discharge  Pupils and irises: Equal, round and reactive to light  Ears, Nose, Mouth, and Throat  External inspection of ears and nose: Normal    Oropharynx: Normal with no erythema, edema, exudate or lesions  Pulmonary  Respiratory effort: No increased work of breathing or signs of respiratory distress  Auscultation of lungs: Clear to auscultation, equal breath sounds bilaterally, no wheezes, no rales, no rhonci  Cardiovascular  Auscultation of heart: Normal rate and rhythm, normal S1 and S2, without murmurs  Examination of extremities for edema and/or varicosities: Normal    Carotid pulses: Normal    Abdomen  Abdomen: Non-tender, no masses  Liver and spleen: No hepatomegaly or splenomegaly  Musculoskeletal  Gait and station: Normal    Inspection/palpation of joints, bones, and muscles: Abnormal  -- lumbar paraspinal hypertonicity  no bony pain  Skin  Skin and subcutaneous tissue: Normal without rashes or lesions  Neurologic  Cranial nerves: Cranial nerves 2-12 intact  Reflexes: 2+ and symmetric  Sensation: No sensory loss     Psychiatric  Orientation to person, place and time: Normal    Mood and affect: Normal

## 2018-09-06 DIAGNOSIS — E78.00 HYPERCHOLESTEROLEMIA: Primary | ICD-10-CM

## 2018-09-06 RX ORDER — CARVEDILOL 25 MG/1
25 TABLET ORAL 2 TIMES DAILY WITH MEALS
Qty: 60 TABLET | Refills: 1 | Status: SHIPPED | OUTPATIENT
Start: 2018-09-06 | End: 2018-12-04 | Stop reason: SDUPTHER

## 2018-10-16 ENCOUNTER — OFFICE VISIT (OUTPATIENT)
Dept: CARDIOLOGY CLINIC | Facility: CLINIC | Age: 73
End: 2018-10-16
Payer: COMMERCIAL

## 2018-10-16 VITALS
BODY MASS INDEX: 34.89 KG/M2 | SYSTOLIC BLOOD PRESSURE: 110 MMHG | DIASTOLIC BLOOD PRESSURE: 72 MMHG | HEIGHT: 63 IN | WEIGHT: 196.9 LBS | HEART RATE: 60 BPM

## 2018-10-16 DIAGNOSIS — E78.00 HYPERCHOLESTEROLEMIA: ICD-10-CM

## 2018-10-16 DIAGNOSIS — I10 BENIGN ESSENTIAL HTN: Primary | ICD-10-CM

## 2018-10-16 DIAGNOSIS — I25.10 CORONARY ARTERY DISEASE INVOLVING NATIVE CORONARY ARTERY OF NATIVE HEART WITHOUT ANGINA PECTORIS: ICD-10-CM

## 2018-10-16 LAB
ALBUMIN SERPL-MCNC: 4.2 G/DL (ref 3.5–4.8)
ALBUMIN/GLOB SERPL: 2 {RATIO} (ref 1.2–2.2)
ALP SERPL-CCNC: 53 IU/L (ref 39–117)
ALT SERPL-CCNC: 16 IU/L (ref 0–44)
AST SERPL-CCNC: 17 IU/L (ref 0–40)
BILIRUB SERPL-MCNC: 0.5 MG/DL (ref 0–1.2)
BUN SERPL-MCNC: 17 MG/DL (ref 8–27)
BUN/CREAT SERPL: 13 (ref 10–24)
CALCIUM SERPL-MCNC: 9 MG/DL (ref 8.6–10.2)
CHLORIDE SERPL-SCNC: 106 MMOL/L (ref 96–106)
CHOLEST SERPL-MCNC: 177 MG/DL (ref 100–199)
CK SERPL-CCNC: 49 U/L (ref 24–204)
CO2 SERPL-SCNC: 21 MMOL/L (ref 20–29)
CREAT SERPL-MCNC: 1.27 MG/DL (ref 0.76–1.27)
GLOBULIN SER-MCNC: 2.1 G/DL (ref 1.5–4.5)
GLUCOSE SERPL-MCNC: 126 MG/DL (ref 65–99)
HDLC SERPL-MCNC: 47 MG/DL
LABCORP COMMENT: NORMAL
LDLC SERPL CALC-MCNC: 94 MG/DL (ref 0–99)
POTASSIUM SERPL-SCNC: 4.3 MMOL/L (ref 3.5–5.2)
PROT SERPL-MCNC: 6.3 G/DL (ref 6–8.5)
PSA SERPL-MCNC: 2.7 NG/ML (ref 0–4)
SL AMB EGFR AFRICAN AMERICAN: 63 ML/MIN/1.73
SL AMB EGFR NON AFRICAN AMERICAN: 55 ML/MIN/1.73
SL AMB VLDL CHOLESTEROL CALC: 36 MG/DL (ref 5–40)
SODIUM SERPL-SCNC: 140 MMOL/L (ref 134–144)
TRIGL SERPL-MCNC: 181 MG/DL (ref 0–149)

## 2018-10-16 PROCEDURE — 99214 OFFICE O/P EST MOD 30 MIN: CPT | Performed by: INTERNAL MEDICINE

## 2018-10-16 NOTE — PROGRESS NOTES
Cardiology Follow Up    Jhonny Islas  1945  [id number]  [address] [facility] [address] [facility] [address] [place] [address] Road    1  Benign essential HTN     2  Hypercholesterolemia     3  Coronary artery disease involving native coronary artery of native heart without angina pectoris         Discussion/Summary:Coronary artery disease stable with no complaints of angina  Blood pressures been well controlled  Lipids have gone up because he stopped taking his Crestor about a month ago  He felt this was giving some musculoskeletal symptoms  He is going to start back again on 2 days a week  No further cardiac testing  Vascular screening was reviewed  Interval History:   74year-old gentle with a history of coronary artery disease status post CABG 10 years ago presents for a routine scheduled follow-up visit  He also has a history of hypertension, hyperlipidemia, mild carotid plaque  Overall he has been doing well  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  He is very active up and down stairs with no physical limitations  Denies any chest pain, shortness of breath, palpitations, lightheadedness, dizziness, or syncope  He has been taking all medications as prescribed  There is no lower extremity edema, PND, orthopnea  Crestor he stopped taking a month ago due to symptoms of muscle aches and numbness      Problem List     Myalgia    Polyarthralgia    Fatigue    Low vitamin D level    Lumbar pain    Adolescent idiopathic scoliosis of thoracolumbar region    Abnormal glucose    Benign essential HTN    BPH (benign prostatic hyperplasia)    Depression    Gastroesophageal reflux disease without esophagitis    Hypercholesterolemia    Scoliosis    Screening for malignant neoplasm of prostate    Coronary artery disease involving native coronary artery of native heart without angina pectoris        Past Medical History:   Diagnosis Date    Actinic keratosis     RESOLVED: [id number]    Adolescent idiopathic scoliosis of thoracolumbar region 4/12/2018    Basal cell carcinoma of back     RESOLVED: [id number]    Basal cell carcinoma of skin of upper extremity, including shoulder     BCC (basal cell carcinoma), trunk     BCE (basal cell epithelioma), trunk     RESOLVED: [id number]    Benign neoplasm of skin of face     Coronary artery disease involving native coronary artery of native heart without angina pectoris 10/16/2018    Depression 10/6/2015    Gastroesophageal reflux disease without esophagitis 4/21/2017    Hypertension     Neoplasm of uncertain behavior of skin     Nonmelanoma skin cancer     LAST ASSESSED: [id number]    Seborrheic keratosis     RESOLVED: [id number]     Social History     Social History    Marital status: /Civil Union     Spouse name: N/A    Number of children: N/A    Years of education: N/A     Occupational History    Not on file  Social History Main Topics    Smoking status: Never Smoker    Smokeless tobacco: Never Used    Alcohol use Yes      Comment: SOCIAL     Drug use: No    Sexual activity: Not on file     Other Topics Concern    Not on file     Social History Narrative    No narrative on file      Family History   Problem Relation Age of Onset    Other Mother         BRAIN TUMOR    No Known Problems Father     Vascular Disease Brother     Cancer Brother      Past Surgical History:   Procedure Laterality Date    ANAL FISTULOTOMY  06/13/2011    DR SANCHEZ    CORONARY ARTERY BYPASS GRAFT  05/17/2005    TRIPLE;   [facility] SURGERY  10/2009       [address] [place] [address] SURGERY  06/18/2015    JOINT REPLACEMENT; DR Arti Hardin       Current Outpatient Prescriptions:     aspirin 325 mg tablet, Take 1 tablet by mouth every other day, Disp: , Rfl:     carvedilol (COREG) 25 mg tablet, Take 1 tablet (25 mg total) by mouth 2 (two) times a day with meals, Disp: 60 tablet, Rfl: 1    methocarbamol (ROBAXIN) 500 mg tablet, Take 1 tablet (500 mg total) by mouth daily at bedtime as needed for muscle spasms, Disp: 30 tablet, Rfl: 3    rosuvastatin (CRESTOR) 10 MG tablet, , Disp: , Rfl: 1  No Known Allergies    Labs:     Chemistry        Component Value Date/Time     05/20/2016 1035     07/21/2015 0954    K 4 3 10/15/2018 0943     10/15/2018 0943    CO2 21 10/15/2018 0943    BUN 17 10/15/2018 0943    CREATININE 1 38 (H) 05/20/2016 1035    CREATININE 1 43 (H) 07/21/2015 0954        Component Value Date/Time    CALCIUM 8 4 05/20/2016 1035    CALCIUM 8 8 07/21/2015 0954    ALKPHOS 58 05/20/2016 1035    ALKPHOS 77 07/21/2015 0954    AST 16 05/20/2016 1035    AST 17 07/21/2015 0954    ALT 22 05/20/2016 1035    ALT 22 07/21/2015 0954    BILITOT 0 53 07/21/2015 0954            Lab Results   Component Value Date    CHOL 142 12/16/2015    CHOL 231 07/21/2015     Lab Results   Component Value Date    HDL 47 10/15/2018    HDL 62 04/20/2018    HDL 51 05/20/2016     Lab Results   Component Value Date    LDLCALC 121 (H) 05/20/2016    LDLCALC 53 12/16/2015    LDLCALC 143 (H) 07/21/2015     Lab Results   Component Value Date    TRIG 181 (H) 10/15/2018    TRIG 147 04/20/2018    TRIG 140 05/20/2016     No components found for: CHOLHDL    Imaging: No results found  ECG:        ROS    Vitals:    10/16/18 0844   BP: 110/72   Pulse: 60     Vitals:    10/16/18 0844   Weight: 89 3 kg (196 lb 14 4 oz)     Height: 5' 3" (160 cm)   Body mass index is 34 88 kg/m²  Physical Exam:   Vital signs reviewed    General appearance:  Appears stated age, alert, well appearing and in no distress  HEENT:  PERRLA, EOMI, no scleral icterus, no conjunctival pallor  NECK:  Supple, No elevated JVP, no thyromegaly, no carotid bruits  HEART:  Regular rate and rhythm, normal S1/S2, no S3/S4, no murmur or rub  LUNGS:  Clear to auscultation bilaterally, no wheezes rales or rhonchi  ABDOMEN:  Soft, non-tender, positive bowel sounds, no rebound or guarding, no organomegaly   EXTREMITIES:  No edema, normal range of motion  VASCULAR:  Normal pedal pulses, good pulse volume   SKIN: No lesions or rashes on exposed skin  NEURO:  CN II-XII intact, no focal deficits

## 2018-10-18 DIAGNOSIS — M54.50 LUMBAR PAIN: ICD-10-CM

## 2018-10-18 RX ORDER — METHOCARBAMOL 500 MG/1
500 TABLET, FILM COATED ORAL
Qty: 30 TABLET | Refills: 0 | Status: SHIPPED | OUTPATIENT
Start: 2018-10-18 | End: 2019-10-15 | Stop reason: ALTCHOICE

## 2018-11-01 ENCOUNTER — OFFICE VISIT (OUTPATIENT)
Dept: INTERNAL MEDICINE CLINIC | Facility: CLINIC | Age: 73
End: 2018-11-01
Payer: COMMERCIAL

## 2018-11-01 VITALS
OXYGEN SATURATION: 93 % | SYSTOLIC BLOOD PRESSURE: 132 MMHG | HEART RATE: 46 BPM | DIASTOLIC BLOOD PRESSURE: 70 MMHG | HEIGHT: 63 IN | BODY MASS INDEX: 34.59 KG/M2 | WEIGHT: 195.2 LBS | TEMPERATURE: 98.9 F

## 2018-11-01 DIAGNOSIS — R00.1 BRADYCARDIA: ICD-10-CM

## 2018-11-01 DIAGNOSIS — R79.89 LOW VITAMIN D LEVEL: ICD-10-CM

## 2018-11-01 DIAGNOSIS — M10.09 ACUTE IDIOPATHIC GOUT OF MULTIPLE SITES: ICD-10-CM

## 2018-11-01 DIAGNOSIS — R73.09 ABNORMAL GLUCOSE: Primary | ICD-10-CM

## 2018-11-01 DIAGNOSIS — E78.00 HYPERCHOLESTEROLEMIA: ICD-10-CM

## 2018-11-01 DIAGNOSIS — I10 BENIGN ESSENTIAL HTN: ICD-10-CM

## 2018-11-01 DIAGNOSIS — K21.9 GASTROESOPHAGEAL REFLUX DISEASE WITHOUT ESOPHAGITIS: ICD-10-CM

## 2018-11-01 PROBLEM — M10.9 GOUT OF MULTIPLE SITES: Status: ACTIVE | Noted: 2018-11-01

## 2018-11-01 PROCEDURE — 1036F TOBACCO NON-USER: CPT | Performed by: FAMILY MEDICINE

## 2018-11-01 PROCEDURE — 4040F PNEUMOC VAC/ADMIN/RCVD: CPT | Performed by: FAMILY MEDICINE

## 2018-11-01 PROCEDURE — 99214 OFFICE O/P EST MOD 30 MIN: CPT | Performed by: FAMILY MEDICINE

## 2018-11-01 PROCEDURE — 3078F DIAST BP <80 MM HG: CPT | Performed by: FAMILY MEDICINE

## 2018-11-01 PROCEDURE — 3075F SYST BP GE 130 - 139MM HG: CPT | Performed by: FAMILY MEDICINE

## 2018-11-01 PROCEDURE — 1101F PT FALLS ASSESS-DOCD LE1/YR: CPT | Performed by: FAMILY MEDICINE

## 2018-11-01 RX ORDER — ROSUVASTATIN CALCIUM 10 MG/1
10 TABLET, COATED ORAL
Qty: 30 TABLET | Refills: 5 | Status: SHIPPED | OUTPATIENT
Start: 2018-11-01 | End: 2018-11-27 | Stop reason: SDUPTHER

## 2018-11-01 RX ORDER — COLCHICINE 0.6 MG/1
0.6 TABLET ORAL 2 TIMES DAILY
Qty: 60 TABLET | Refills: 0 | Status: SHIPPED | OUTPATIENT
Start: 2018-11-01 | End: 2019-08-26 | Stop reason: SDUPTHER

## 2018-11-01 RX ORDER — INDOMETHACIN 50 MG/1
50 CAPSULE ORAL 2 TIMES DAILY WITH MEALS
Qty: 60 CAPSULE | Refills: 0 | Status: SHIPPED | OUTPATIENT
Start: 2018-11-01 | End: 2019-06-04 | Stop reason: SDUPTHER

## 2018-11-01 NOTE — PROGRESS NOTES
Assessment     1  Benign essential HTN (401 1) (I10)   2  Hypercholesterolemia (272 0) (E78 00)   3  Gastroesophageal reflux disease without esophagitis (530 81) (K21 9)   5  Abnormal glucose (790 29) (R73 09)   7  BPH (benign prostatic hyperplasia) (600 00) (N40 0)  adolescent scoliosis   osteoarthritis   degenerative joint disease     Plan  Abnormal glucose    · (1) COMPREHENSIVE METABOLIC PANEL   · (1) HEMOGLOBIN A1C;;   BPH (benign prostatic hyperplasia)    ·  Hypercholesterolemia    · : Carvedilol 25 MG Oral Tablet; TAKE 1 TABLET TWICE DAILY,  WITH MORNING AND EVENINGMEAL   · (1) CK (CPK);    · (1) LIPID PANEL FASTING W DIRECT LDL REFLEX;    · Follow-up visit in 6 Months Evaluation and Treatment  Follow-up       Discussion/Summary  Discussion Summary:   Keep Increased water intake, discussed weight and elevated sugar and possibly the need for medications for DM  avoid ibuprofen- ok to use tylenol  check labs for lyme  repeat labs in 6 months, discussed increased water intake  cont with cardiology and urology appt  Has appointment with Orthopedics for neck and back pain  P r n  Naprosyn and Robaxin which   He already has  Counseling Documentation With Imm: The patient was counseled regarding diagnostic results  Chief Complaint  Chief Complaint Free Text Note Form: Patient is here for a 6 m f/u visit for hypertension, hyperlipidemia and GERD  Review labs  Still with back stiffness and occasionally uses Robaxin  Has an appointment with Ortho   Chief Complaint Chronic Condition [facility]: Patient is here today for follow up of chronic conditions described in HPI  History of Present Illness  Hyperlipidemia (Follow-Up): The patient states his hyperlipidemia has been stable since the last visit  Comorbid Illnesses: hypertension  He has no significant interval events  Symptoms: The patient is currently asymptomatic  Associated symptoms include no focal neurologic deficits  Medications:  The patient is not currently on any medications for his hyperlipidemia  The patient is not doing well with his hyperlipidemia goals  the patient's LDL goal is 80 mg/dL  The patient is due for a lipid panel  Hypertension (Follow-Up): The patient presents for follow-up of essential hypertension  The patient states he has been doing well with his blood pressure control since the last visit  He has no comorbid illnesses  He has no significant interval events  Symptoms: The patient is currently asymptomatic  Associated symptoms include no headache  Medications: The patient is not currently on any medications for his hypertension--lifestyle modification only  Disease Management: the patient is not doing well with his blood pressure goals  Coronary Artery Disease (Follow-Up): The patient states he has been doing well with his coronary artery disease symptoms since the last visit  Comorbid Illnesses: hypertension  Complications: MI    Symptoms: The patient is currently asymptomatic  His symptoms do not limit his activities  He is able to walk about 2 to 3 flights of stairs without symptoms  He denies nitroglycerin use since the last visit  Medications: The patient is not currently on any medications for his coronary artery disease  Review of Systems  Complete-Male:  Constitutional:  no fever,-- no recent weight gain,-- no chills-- and-- not feeling tired  Eyes: no eyesight problems  ENT: allergies, but-- no nasal discharge  Cardiovascular: no chest pain,-- no palpitations-- and-- no extremity edema  Respiratory: no cough-- and-- no shortness of breath during exertion  Gastrointestinal: reflux, but-- as noted in HPI-- and-- no abdominal pain  Genitourinary: no dysuria-- and-- no incontinence  Musculoskeletal: arthralgias-- and-- myalgias, but-- as noted in HPI  Neurological: no headache-- and-- no dizziness  Psychiatric: sleep disturbances-- and-- recent frequently awakenings, but-- as noted in HPI        Vitals: 11/01/18 1539   BP: 132/70   Pulse: (!) 46   Temp: 98 9 °F (37 2 °C)   SpO2: 93%     Vital Signs         Physical Exam   Constitutional  General appearance: No acute distress, well appearing and well nourished  Eyes  Conjunctiva and lids: No swelling, erythema, or discharge  Pupils and irises: Equal, round and reactive to light  Ears, Nose, Mouth, and Throat  External inspection of ears and nose: Normal    Oropharynx: Normal with no erythema, edema, exudate or lesions  Pulmonary  Respiratory effort: No increased work of breathing or signs of respiratory distress  Auscultation of lungs: Clear to auscultation, equal breath sounds bilaterally, no wheezes, no rales, no rhonci  Cardiovascular  Auscultation of heart: Normal rate and rhythm, normal S1 and S2, without murmurs  Examination of extremities for edema and/or varicosities: Normal    Carotid pulses: Normal    Abdomen  Abdomen: Non-tender, no masses  Liver and spleen: No hepatomegaly or splenomegaly  Musculoskeletal  Gait and station: Normal    Inspection/palpation of joints, bones, and muscles: Abnormal  -- lumbar paraspinal hypertonicity  no bony pain  Skin  Skin and subcutaneous tissue: Normal without rashes or lesions  Neurologic  Cranial nerves: Cranial nerves 2-12 intact  Reflexes: 2+ and symmetric  Sensation: No sensory loss     Psychiatric  Orientation to person, place and time: Normal    Mood and affect: Normal

## 2018-11-04 DIAGNOSIS — E78.00 HYPERCHOLESTEROLEMIA: ICD-10-CM

## 2018-11-05 RX ORDER — CARVEDILOL 25 MG/1
TABLET ORAL
Qty: 60 TABLET | Refills: 1 | OUTPATIENT
Start: 2018-11-05

## 2018-11-27 DIAGNOSIS — E78.00 HYPERCHOLESTEROLEMIA: ICD-10-CM

## 2018-11-27 RX ORDER — ROSUVASTATIN CALCIUM 10 MG/1
10 TABLET, COATED ORAL
Qty: 90 TABLET | Refills: 1 | Status: SHIPPED | OUTPATIENT
Start: 2018-11-27 | End: 2019-09-09 | Stop reason: SDUPTHER

## 2018-11-27 NOTE — TELEPHONE ENCOUNTER
Per insurance Rx must be 90 day supply  Please resend  Thank you!     Last appt, 11/1/18    Next appt, 5/13/19

## 2018-11-30 DIAGNOSIS — E78.00 HYPERCHOLESTEROLEMIA: ICD-10-CM

## 2018-11-30 RX ORDER — CARVEDILOL 25 MG/1
TABLET ORAL
Qty: 60 TABLET | Refills: 1 | OUTPATIENT
Start: 2018-11-30

## 2018-12-03 DIAGNOSIS — E78.00 HYPERCHOLESTEROLEMIA: ICD-10-CM

## 2018-12-03 RX ORDER — CARVEDILOL 25 MG/1
TABLET ORAL
Qty: 60 TABLET | Refills: 1 | OUTPATIENT
Start: 2018-12-03

## 2018-12-04 DIAGNOSIS — E78.00 HYPERCHOLESTEROLEMIA: ICD-10-CM

## 2018-12-04 RX ORDER — CARVEDILOL 25 MG/1
25 TABLET ORAL 2 TIMES DAILY WITH MEALS
Qty: 180 TABLET | Refills: 0 | Status: SHIPPED | OUTPATIENT
Start: 2018-12-04 | End: 2019-03-20 | Stop reason: SDUPTHER

## 2019-02-22 ENCOUNTER — HOSPITAL ENCOUNTER (OUTPATIENT)
Dept: RADIOLOGY | Facility: IMAGING CENTER | Age: 74
Discharge: HOME | End: 2019-02-22
Payer: COMMERCIAL

## 2019-02-22 DIAGNOSIS — M79.671 RIGHT FOOT PAIN: ICD-10-CM

## 2019-02-22 PROCEDURE — 73630 X-RAY EXAM OF FOOT: CPT

## 2019-03-20 DIAGNOSIS — E78.00 HYPERCHOLESTEROLEMIA: ICD-10-CM

## 2019-03-20 RX ORDER — CARVEDILOL 25 MG/1
25 TABLET ORAL 2 TIMES DAILY WITH MEALS
Qty: 180 TABLET | Refills: 0 | Status: SHIPPED | OUTPATIENT
Start: 2019-03-20 | End: 2019-09-09 | Stop reason: SDUPTHER

## 2019-04-24 ENCOUNTER — OFFICE VISIT (OUTPATIENT)
Dept: CARDIOLOGY CLINIC | Facility: CLINIC | Age: 74
End: 2019-04-24
Payer: COMMERCIAL

## 2019-04-24 VITALS
HEART RATE: 50 BPM | BODY MASS INDEX: 34.78 KG/M2 | DIASTOLIC BLOOD PRESSURE: 74 MMHG | SYSTOLIC BLOOD PRESSURE: 118 MMHG | HEIGHT: 63 IN | WEIGHT: 196.3 LBS

## 2019-04-24 DIAGNOSIS — R00.1 BRADYCARDIA: ICD-10-CM

## 2019-04-24 DIAGNOSIS — I25.10 CORONARY ARTERY DISEASE INVOLVING NATIVE CORONARY ARTERY OF NATIVE HEART WITHOUT ANGINA PECTORIS: ICD-10-CM

## 2019-04-24 DIAGNOSIS — I10 BENIGN ESSENTIAL HTN: Primary | ICD-10-CM

## 2019-04-24 DIAGNOSIS — E78.00 HYPERCHOLESTEROLEMIA: ICD-10-CM

## 2019-04-24 LAB
25(OH)D3+25(OH)D2 SERPL-MCNC: 30.9 NG/ML (ref 30–100)
ALBUMIN SERPL-MCNC: 4.2 G/DL (ref 3.5–4.8)
ALBUMIN/GLOB SERPL: 2 {RATIO} (ref 1.2–2.2)
ALP SERPL-CCNC: 48 IU/L (ref 39–117)
ALT SERPL-CCNC: 16 IU/L (ref 0–44)
AST SERPL-CCNC: 15 IU/L (ref 0–40)
BASOPHILS # BLD AUTO: 0 X10E3/UL (ref 0–0.2)
BASOPHILS NFR BLD AUTO: 0 %
BILIRUB SERPL-MCNC: 0.5 MG/DL (ref 0–1.2)
BUN SERPL-MCNC: 21 MG/DL (ref 8–27)
BUN/CREAT SERPL: 15 (ref 10–24)
CALCIUM SERPL-MCNC: 9 MG/DL (ref 8.6–10.2)
CHLORIDE SERPL-SCNC: 105 MMOL/L (ref 96–106)
CHOLEST SERPL-MCNC: 151 MG/DL (ref 100–199)
CO2 SERPL-SCNC: 21 MMOL/L (ref 20–29)
CREAT SERPL-MCNC: 1.38 MG/DL (ref 0.76–1.27)
EOSINOPHIL # BLD AUTO: 0.1 X10E3/UL (ref 0–0.4)
EOSINOPHIL NFR BLD AUTO: 1 %
ERYTHROCYTE [DISTWIDTH] IN BLOOD BY AUTOMATED COUNT: 14.5 % (ref 12.3–15.4)
GLOBULIN SER-MCNC: 2.1 G/DL (ref 1.5–4.5)
GLUCOSE SERPL-MCNC: 173 MG/DL (ref 65–99)
HBA1C MFR BLD: 6.3 % (ref 4.8–5.6)
HCT VFR BLD AUTO: 44.7 % (ref 37.5–51)
HDLC SERPL-MCNC: 49 MG/DL
HGB BLD-MCNC: 15.1 G/DL (ref 13–17.7)
IMM GRANULOCYTES # BLD: 0 X10E3/UL (ref 0–0.1)
IMM GRANULOCYTES NFR BLD: 1 %
LABCORP COMMENT: NORMAL
LDLC SERPL CALC-MCNC: 73 MG/DL (ref 0–99)
LYMPHOCYTES # BLD AUTO: 2.1 X10E3/UL (ref 0.7–3.1)
LYMPHOCYTES NFR BLD AUTO: 34 %
MCH RBC QN AUTO: 30.5 PG (ref 26.6–33)
MCHC RBC AUTO-ENTMCNC: 33.8 G/DL (ref 31.5–35.7)
MCV RBC AUTO: 90 FL (ref 79–97)
MONOCYTES # BLD AUTO: 0.5 X10E3/UL (ref 0.1–0.9)
MONOCYTES NFR BLD AUTO: 8 %
NEUTROPHILS # BLD AUTO: 3.5 X10E3/UL (ref 1.4–7)
NEUTROPHILS NFR BLD AUTO: 56 %
PLATELET # BLD AUTO: 409 X10E3/UL (ref 150–379)
POTASSIUM SERPL-SCNC: 5.4 MMOL/L (ref 3.5–5.2)
PROT SERPL-MCNC: 6.3 G/DL (ref 6–8.5)
RBC # BLD AUTO: 4.95 X10E6/UL (ref 4.14–5.8)
SL AMB EGFR AFRICAN AMERICAN: 57 ML/MIN/1.73
SL AMB EGFR NON AFRICAN AMERICAN: 50 ML/MIN/1.73
SL AMB VLDL CHOLESTEROL CALC: 29 MG/DL (ref 5–40)
SODIUM SERPL-SCNC: 142 MMOL/L (ref 134–144)
TRIGL SERPL-MCNC: 146 MG/DL (ref 0–149)
TSH SERPL DL<=0.005 MIU/L-ACNC: 4.46 UIU/ML (ref 0.45–4.5)
URATE SERPL-MCNC: 8.4 MG/DL (ref 3.7–8.6)
WBC # BLD AUTO: 6.3 X10E3/UL (ref 3.4–10.8)

## 2019-04-24 PROCEDURE — 99214 OFFICE O/P EST MOD 30 MIN: CPT | Performed by: INTERNAL MEDICINE

## 2019-04-24 PROCEDURE — 93000 ELECTROCARDIOGRAM COMPLETE: CPT | Performed by: INTERNAL MEDICINE

## 2019-05-13 ENCOUNTER — OFFICE VISIT (OUTPATIENT)
Dept: INTERNAL MEDICINE CLINIC | Facility: CLINIC | Age: 74
End: 2019-05-13
Payer: COMMERCIAL

## 2019-05-13 VITALS
BODY MASS INDEX: 35.05 KG/M2 | HEIGHT: 63 IN | DIASTOLIC BLOOD PRESSURE: 70 MMHG | TEMPERATURE: 98.4 F | WEIGHT: 197.8 LBS | OXYGEN SATURATION: 97 % | SYSTOLIC BLOOD PRESSURE: 128 MMHG | HEART RATE: 60 BPM

## 2019-05-13 DIAGNOSIS — Z23 NEED FOR PNEUMOCOCCAL VACCINE: Primary | ICD-10-CM

## 2019-05-13 DIAGNOSIS — R79.89 LOW VITAMIN D LEVEL: ICD-10-CM

## 2019-05-13 DIAGNOSIS — M10.09 ACUTE IDIOPATHIC GOUT OF MULTIPLE SITES: ICD-10-CM

## 2019-05-13 DIAGNOSIS — I10 BENIGN ESSENTIAL HTN: ICD-10-CM

## 2019-05-13 DIAGNOSIS — L85.3 DRY SKIN: ICD-10-CM

## 2019-05-13 DIAGNOSIS — M25.50 POLYARTHRALGIA: ICD-10-CM

## 2019-05-13 DIAGNOSIS — E78.00 HYPERCHOLESTEROLEMIA: ICD-10-CM

## 2019-05-13 DIAGNOSIS — R53.83 OTHER FATIGUE: ICD-10-CM

## 2019-05-13 DIAGNOSIS — M54.50 LUMBAR PAIN: ICD-10-CM

## 2019-05-13 DIAGNOSIS — R73.09 ABNORMAL GLUCOSE: ICD-10-CM

## 2019-05-13 PROCEDURE — 4040F PNEUMOC VAC/ADMIN/RCVD: CPT

## 2019-05-13 PROCEDURE — 99214 OFFICE O/P EST MOD 30 MIN: CPT | Performed by: FAMILY MEDICINE

## 2019-05-13 PROCEDURE — G0009 ADMIN PNEUMOCOCCAL VACCINE: HCPCS

## 2019-05-13 PROCEDURE — 1036F TOBACCO NON-USER: CPT | Performed by: FAMILY MEDICINE

## 2019-05-13 PROCEDURE — 90732 PPSV23 VACC 2 YRS+ SUBQ/IM: CPT

## 2019-05-13 RX ORDER — GABAPENTIN 100 MG/1
100 CAPSULE ORAL 2 TIMES DAILY
Qty: 180 CAPSULE | Refills: 1 | Status: SHIPPED | OUTPATIENT
Start: 2019-05-13 | End: 2019-10-15 | Stop reason: ALTCHOICE

## 2019-06-04 ENCOUNTER — TELEPHONE (OUTPATIENT)
Dept: INTERNAL MEDICINE CLINIC | Facility: CLINIC | Age: 74
End: 2019-06-04

## 2019-06-04 DIAGNOSIS — M10.09 ACUTE IDIOPATHIC GOUT OF MULTIPLE SITES: ICD-10-CM

## 2019-06-06 RX ORDER — INDOMETHACIN 50 MG/1
50 CAPSULE ORAL 2 TIMES DAILY WITH MEALS
Qty: 60 CAPSULE | Refills: 0 | Status: SHIPPED | OUTPATIENT
Start: 2019-06-06 | End: 2019-08-26 | Stop reason: SDUPTHER

## 2019-08-23 LAB
25(OH)D3+25(OH)D2 SERPL-MCNC: 34.8 NG/ML (ref 30–100)
ALBUMIN SERPL-MCNC: 4.2 G/DL (ref 3.5–4.8)
ALBUMIN/GLOB SERPL: 2 {RATIO} (ref 1.2–2.2)
ALP SERPL-CCNC: 52 IU/L (ref 39–117)
ALT SERPL-CCNC: 27 IU/L (ref 0–44)
AST SERPL-CCNC: 19 IU/L (ref 0–40)
BILIRUB SERPL-MCNC: 0.4 MG/DL (ref 0–1.2)
BUN SERPL-MCNC: 17 MG/DL (ref 8–27)
BUN/CREAT SERPL: 13 (ref 10–24)
CALCIUM SERPL-MCNC: 9.6 MG/DL (ref 8.6–10.2)
CHLORIDE SERPL-SCNC: 105 MMOL/L (ref 96–106)
CHOLEST SERPL-MCNC: 111 MG/DL (ref 100–199)
CO2 SERPL-SCNC: 23 MMOL/L (ref 20–29)
CREAT SERPL-MCNC: 1.36 MG/DL (ref 0.76–1.27)
GLOBULIN SER-MCNC: 2.1 G/DL (ref 1.5–4.5)
GLUCOSE SERPL-MCNC: 116 MG/DL (ref 65–99)
HBA1C MFR BLD: 6.2 % (ref 4.8–5.6)
HDLC SERPL-MCNC: 54 MG/DL
LDLC SERPL CALC-MCNC: 41 MG/DL (ref 0–99)
POTASSIUM SERPL-SCNC: 5.2 MMOL/L (ref 3.5–5.2)
PROT SERPL-MCNC: 6.3 G/DL (ref 6–8.5)
SL AMB EGFR AFRICAN AMERICAN: 58 ML/MIN/1.73
SL AMB EGFR NON AFRICAN AMERICAN: 50 ML/MIN/1.73
SL AMB VLDL CHOLESTEROL CALC: 16 MG/DL (ref 5–40)
SODIUM SERPL-SCNC: 142 MMOL/L (ref 134–144)
TRIGL SERPL-MCNC: 79 MG/DL (ref 0–149)

## 2019-08-26 DIAGNOSIS — M10.09 ACUTE IDIOPATHIC GOUT OF MULTIPLE SITES: ICD-10-CM

## 2019-08-26 RX ORDER — COLCHICINE 0.6 MG/1
0.6 TABLET ORAL 2 TIMES DAILY
Qty: 60 TABLET | Refills: 0 | Status: SHIPPED | OUTPATIENT
Start: 2019-08-26 | End: 2019-10-15 | Stop reason: ALTCHOICE

## 2019-08-26 RX ORDER — INDOMETHACIN 50 MG/1
50 CAPSULE ORAL 2 TIMES DAILY WITH MEALS
Qty: 60 CAPSULE | Refills: 0 | Status: SHIPPED | OUTPATIENT
Start: 2019-08-26 | End: 2019-10-15 | Stop reason: ALTCHOICE

## 2019-09-09 DIAGNOSIS — E78.00 HYPERCHOLESTEROLEMIA: ICD-10-CM

## 2019-09-10 RX ORDER — CARVEDILOL 25 MG/1
25 TABLET ORAL 2 TIMES DAILY WITH MEALS
Qty: 180 TABLET | Refills: 0 | Status: SHIPPED | OUTPATIENT
Start: 2019-09-10 | End: 2020-05-15 | Stop reason: SDUPTHER

## 2019-09-10 RX ORDER — ROSUVASTATIN CALCIUM 10 MG/1
10 TABLET, COATED ORAL
Qty: 90 TABLET | Refills: 0 | Status: SHIPPED | OUTPATIENT
Start: 2019-09-10 | End: 2020-01-30 | Stop reason: SDUPTHER

## 2019-10-15 ENCOUNTER — OFFICE VISIT (OUTPATIENT)
Dept: INTERNAL MEDICINE CLINIC | Facility: CLINIC | Age: 74
End: 2019-10-15
Payer: COMMERCIAL

## 2019-10-15 VITALS
HEART RATE: 50 BPM | SYSTOLIC BLOOD PRESSURE: 117 MMHG | HEIGHT: 63 IN | TEMPERATURE: 97.6 F | DIASTOLIC BLOOD PRESSURE: 68 MMHG | WEIGHT: 189.4 LBS | BODY MASS INDEX: 33.56 KG/M2 | OXYGEN SATURATION: 97 %

## 2019-10-15 DIAGNOSIS — K21.9 GASTROESOPHAGEAL REFLUX DISEASE WITHOUT ESOPHAGITIS: ICD-10-CM

## 2019-10-15 DIAGNOSIS — M10.09 ACUTE IDIOPATHIC GOUT OF MULTIPLE SITES: ICD-10-CM

## 2019-10-15 DIAGNOSIS — R73.09 ABNORMAL GLUCOSE: ICD-10-CM

## 2019-10-15 DIAGNOSIS — F32.A DEPRESSION, UNSPECIFIED DEPRESSION TYPE: ICD-10-CM

## 2019-10-15 DIAGNOSIS — R53.83 OTHER FATIGUE: ICD-10-CM

## 2019-10-15 DIAGNOSIS — I10 BENIGN ESSENTIAL HTN: Primary | ICD-10-CM

## 2019-10-15 DIAGNOSIS — Z23 NEED FOR INFLUENZA VACCINATION: ICD-10-CM

## 2019-10-15 DIAGNOSIS — N40.1 BENIGN PROSTATIC HYPERPLASIA WITH URINARY FREQUENCY: ICD-10-CM

## 2019-10-15 DIAGNOSIS — R35.0 BENIGN PROSTATIC HYPERPLASIA WITH URINARY FREQUENCY: ICD-10-CM

## 2019-10-15 DIAGNOSIS — Z11.59 NEED FOR HEPATITIS C SCREENING TEST: ICD-10-CM

## 2019-10-15 DIAGNOSIS — M54.50 LUMBAR PAIN: ICD-10-CM

## 2019-10-15 PROCEDURE — 3074F SYST BP LT 130 MM HG: CPT | Performed by: INTERNAL MEDICINE

## 2019-10-15 PROCEDURE — 3078F DIAST BP <80 MM HG: CPT | Performed by: INTERNAL MEDICINE

## 2019-10-15 PROCEDURE — 1125F AMNT PAIN NOTED PAIN PRSNT: CPT | Performed by: INTERNAL MEDICINE

## 2019-10-15 PROCEDURE — G0439 PPPS, SUBSEQ VISIT: HCPCS | Performed by: INTERNAL MEDICINE

## 2019-10-15 PROCEDURE — 99214 OFFICE O/P EST MOD 30 MIN: CPT | Performed by: INTERNAL MEDICINE

## 2019-10-15 PROCEDURE — 1170F FXNL STATUS ASSESSED: CPT | Performed by: INTERNAL MEDICINE

## 2019-10-15 RX ORDER — INDOMETHACIN 50 MG/1
50 CAPSULE ORAL 2 TIMES DAILY WITH MEALS
Qty: 60 CAPSULE | Refills: 0
Start: 2019-10-15 | End: 2021-01-25 | Stop reason: SDUPTHER

## 2019-10-15 RX ORDER — COLCHICINE 0.6 MG/1
0.6 TABLET ORAL 2 TIMES DAILY
Qty: 60 TABLET | Refills: 0
Start: 2019-10-15 | End: 2021-01-25 | Stop reason: SDUPTHER

## 2019-10-15 NOTE — PROGRESS NOTES
Assessment and Plan:     Problem List Items Addressed This Visit        Digestive    Gastroesophageal reflux disease without esophagitis       Cardiovascular and Mediastinum    Benign essential HTN       Genitourinary    BPH (benign prostatic hyperplasia)       Other    Lumbar pain    Abnormal glucose    Depression    Gout of multiple sites      Other Visit Diagnoses     Need for influenza vaccination    -  Primary           Preventive health issues were discussed with patient, and age appropriate screening tests were ordered as noted in patient's After Visit Summary  Personalized health advice and appropriate referrals for health education or preventive services given if needed, as noted in patient's After Visit Summary       History of Present Illness:     Patient presents for Medicare Annual Wellness visit    Patient Care Team:  Real Farmer DO as PCP - MD Kaycee Sterling MD     Problem List:     Patient Active Problem List   Diagnosis    Myalgia    Polyarthralgia    Fatigue    Low vitamin D level    Lumbar pain    Adolescent idiopathic scoliosis of thoracolumbar region    Abnormal glucose    Benign essential HTN    BPH (benign prostatic hyperplasia)    Depression    Gastroesophageal reflux disease without esophagitis    Hypercholesterolemia    Scoliosis    Screening for malignant neoplasm of prostate    Coronary artery disease involving native coronary artery of native heart without angina pectoris    Gout of multiple sites    Bradycardia    Dry skin    BMI 35 0-35 9,adult      Past Medical and Surgical History:     Past Medical History:   Diagnosis Date    Actinic keratosis     RESOLVED: [id number]    Adolescent idiopathic scoliosis of thoracolumbar region 4/12/2018    Basal cell carcinoma of back     RESOLVED: [id number]    Basal cell carcinoma of skin of upper extremity, including shoulder     BCC (basal cell carcinoma), trunk     BCE (basal cell epithelioma), trunk     RESOLVED: [id number]    Benign neoplasm of skin of face     Coronary artery disease involving native coronary artery of native heart without angina pectoris 10/16/2018    Depression 10/6/2015    Gastroesophageal reflux disease without esophagitis 4/21/2017    Hypertension     Neoplasm of uncertain behavior of skin     Nonmelanoma skin cancer     LAST ASSESSED: [id number]    Seborrheic keratosis     RESOLVED: [id number]     Past Surgical History:   Procedure Laterality Date    ANAL FISTULOTOMY  06/13/2011    DR SANCHEZ    CORONARY ARTERY BYPASS GRAFT  05/17/2005    TRIPLE;   [address] SURGERY  10/2009       [address] [place] [address] SURGERY  06/18/2015    JOINT REPLACEMENT; DR Torrez Owen      Family History:     Family History   Problem Relation Age of Onset    Other Mother         BRAIN TUMOR    No Known Problems Father     Vascular Disease Brother     Cancer Brother       Social History:     Social History     Socioeconomic History    Marital status: /Civil Union     Spouse name: Not on file    Number of children: Not on file    Years of education: Not on file    Highest education level: Not on file   Occupational History    Not on file   Social Needs    Financial resource strain: Not on file    Food insecurity:     Worry: Not on file     Inability: Not on file    Transportation needs:     Medical: Not on file     Non-medical: Not on file   Tobacco Use    Smoking status: Never Smoker    Smokeless tobacco: Never Used   Substance and Sexual Activity    Alcohol use: Yes     Comment: SOCIAL     Drug use: No    Sexual activity: Not on file   Lifestyle    Physical activity:     Days per week: Not on file     Minutes per session: Not on file    Stress: Not on file   Relationships    Social connections:     Talks on phone: Not on file     Gets together: Not on file     Attends Holiness service: Not on file Active member of club or organization: Not on file     Attends meetings of clubs or organizations: Not on file     Relationship status: Not on file    Intimate partner violence:     Fear of current or ex partner: Not on file     Emotionally abused: Not on file     Physically abused: Not on file     Forced sexual activity: Not on file   Other Topics Concern    Not on file   Social History Narrative    Not on file       Medications and Allergies:     Current Outpatient Medications   Medication Sig Dispense Refill    aspirin 81 MG tablet Take 1 tablet by mouth every other day      carvedilol (COREG) 25 mg tablet Take 1 tablet (25 mg total) by mouth 2 (two) times a day with meals 180 tablet 0    colchicine (COLCRYS) 0 6 mg tablet Take 1 tablet (0 6 mg total) by mouth 2 (two) times a day 60 tablet 0    gabapentin (NEURONTIN) 100 mg capsule Take 1 capsule (100 mg total) by mouth 2 (two) times a day 180 capsule 1    indomethacin (INDOCIN) 50 mg capsule Take 1 capsule (50 mg total) by mouth 2 (two) times a day with meals 60 capsule 0    metFORMIN (GLUCOPHAGE) 500 mg tablet Take 1 tablet (500 mg total) by mouth daily 90 tablet 1    methocarbamol (ROBAXIN) 500 mg tablet Take 1 tablet (500 mg total) by mouth daily at bedtime as needed for muscle spasms 30 tablet 0    rosuvastatin (CRESTOR) 10 MG tablet Take 1 tablet (10 mg total) by mouth daily at bedtime 90 tablet 0     No current facility-administered medications for this visit        Allergies   Allergen Reactions    Other      S[place] trees      Immunizations:     Immunization History   Administered Date(s) Administered    INFLUENZA 09/11/2018    Influenza Split High Dose Preservative Free IM 11/03/2016    Influenza TIV (IM) 10/13/2015, 09/27/2017    Pneumococcal Conjugate 13-Valent 10/06/2015    Pneumococcal Polysaccharide PPV23 05/13/2019    Tdap 10/13/2015    influenza, trivalent, adjuvanted 08/28/2019      Health Maintenance:         Topic Date Due    CRC Screening: Colonoscopy  07/27/2021     There are no preventive care reminders to display for this patient  Medicare Health Risk Assessment:     There were no vitals taken for this visit  Jessica Borjas is here for his Initial Wellness visit  Last Medicare Wellness visit information reviewed, patient interviewed and updates made to the record today  Health Risk Assessment:   Patient rates overall health as very good  Patient feels that their physical health rating is slightly worse  Eyesight was rated as same  Hearing was rated as same  Patient feels that their emotional and mental health rating is same  Pain experienced in the last 7 days has been some  Patient's pain rating has been 5/10  Patient states that he has experienced no weight loss or gain in last 6 months  Depression Screening:   PHQ-2 Score: 1  PHQ-9 Score: 4      Fall Risk Screening: In the past year, patient has experienced: no history of falling in past year      Home Safety:  Patient has trouble with stairs inside or outside of their home  Patient has working smoke alarms and has working carbon monoxide detector  Home safety hazards include: none  Nutrition:   Current diet is Regular and Limited junk food  Medications:   Patient is currently taking over-the-counter supplements  OTC medications include: see medication list  Patient is able to manage medications  Activities of Daily Living (ADLs)/Instrumental Activities of Daily Living (IADLs):   Walk and transfer into and out of bed and chair?: Yes  Dress and groom yourself?: Yes    Bathe or shower yourself?: Yes    Feed yourself? Yes  Do your laundry/housekeeping?: Yes  Manage your money, pay your bills and track your expenses?: Yes  Make your own meals?: Yes    Do your own shopping?: Yes    Previous Hospitalizations:   Any hospitalizations or ED visits within the last 12 months?: No      Advance Care Planning:   Living will: Yes    Durable POA for healthcare:  Yes Advanced directive: Yes      PREVENTIVE SCREENINGS      Cardiovascular Screening:    General: Screening Not Indicated, History Lipid Disorder and Screening Current      Diabetes Screening:     General: Screening Current      Colorectal Cancer Screening:     General: Screening Current      Prostate Cancer Screening:    General: Screening Not Indicated and Screening Current    Due for: PSA      Abdominal Aortic Aneurysm (AAA) Screening:    Risk factors include: age between 73-69 yo        General: Screening Not Indicated      Lung Cancer Screening:     General: Screening Not Indicated      Hepatitis C Screening:    General: Screening Current    Other Counseling Topics:   Regular weightbearing exercise and calcium and vitamin D intake         Vickie Mckenzie MD

## 2019-10-15 NOTE — PATIENT INSTRUCTIONS

## 2019-10-15 NOTE — PROGRESS NOTES
Assessment/Plan:    Lumbar pain  Patient states his pain has gotten worse in the past 5-6 months  He has new gait imbalance and bilateral knee and leg pain  Denies any bowel/bladder incontinence  Per patient, he was told he has a herniated disc in that region  Follows an orthopedist as an outpatient and has an MRI scheduled for this Friday  · Continue to follow with outpatient specialist    Gout of multiple sites  Patient states he has not had any symptoms recently as long as he watches what he eats  He is aware of what foods he needs to avoid  He does not take his Colchicine or Indomethacin regularly but rather takes them as needed when he feels pain  His last CMP from 8/22 showed Cr elevated at 1 36   · Continue Colchicine and Indomethacin as needed for pain  · Patient counseled on maintaining proper hydration   · Will check urinalysis and CMP for next follow up visit    Benign essential HTN  Well controlled on home medication; patient endorses compliance  BP in office was 117/68  · Continue Carvedilol 25 mg PO b i d    BPH (benign prostatic hyperplasia)  Patient currently denies any urinary symptoms  · Recheck PSA for next follow up visit    Abnormal glucose  Last HgbA1c on 8/22 elevated at 6 2  Patient was prescribed Meformin at last visit but states he stopped taking it as he would like to control blood sugar with diet and exercise  States he has reduced portion sizes, walks 1 mile a day, and will go to the gym 3 times per week  · Discontinue Metformin for the time being  · Continue with regular exercise and dietary modifications  · Recheck HgbA1c for next follow up visit        Diagnoses and all orders for this visit:    Benign essential HTN    Need for influenza vaccination    Benign prostatic hyperplasia with urinary frequency  -     Comprehensive metabolic panel;  Future  -     CBC and differential; Future  -     UA w Reflex to Microscopic w Reflex to Culture  -     PSA Total (Reflex To Free); Future    Depression, unspecified depression type    Gastroesophageal reflux disease without esophagitis    Lumbar pain  -     Cancel: US retroperitoneal complete; Future    Abnormal glucose  -     HEMOGLOBIN A1C W/ EAG ESTIMATION; Future    Acute idiopathic gout of multiple sites  -     UA w Reflex to Microscopic w Reflex to Culture - Clinic Collect  -     colchicine (COLCRYS) 0 6 mg tablet; Take 1 tablet (0 6 mg total) by mouth 2 (two) times a day  -     indomethacin (INDOCIN) 50 mg capsule; Take 1 capsule (50 mg total) by mouth 2 (two) times a day with meals    Other fatigue  -     TSH, 3rd generation with Free T4 reflex; Future    Need for hepatitis C screening test  -     Hepatitis C antibody; Future    Other orders  -     Cancel: influenza vaccine, 2002-0555, high-dose, PF 0 5 mL (FLUZONE HIGH-DOSE)          Subjective:   Chief Complaint   Patient presents with    Follow-up     4 M f/u visit for hypertension, review labs    Medicare Wellness Visit     Initial AWV        Patient ID: Nicolle Gibson is a 66 y o  male  Patient was seen in the office today for a follow up visit/annual wellness check  His main complaint is chronic back pain  He has had lower back pain for years now which significantly worsening in the past 5-6 months  He rates the pain an 8-9/10 and states the the pain will occasionally radiate down both of his legs and is most severe in his knees  He also has noticed associated gait imbalance as he now feels unsteady when he is walking  He has been waking up multiple times per night due to the lower back pain and numbness in his legs  He denies any bowel/bladder incontinence or loss of sensation in his lower extremities  He states he follows with an Orthopedist outpatient who suggested physical therapy but this has provided him no benefit  The patient does not take any over-the-counter or prescribed medications for this pain as he does not like taking pills   Patient reports that he has been told he had a herniated disc  He has an MRI scheduled for this Friday  He has a history of HTN which is well controlled with his home medication  He also has a history of gout which he states has not been bothering him recently as long as he watches what he eats  He will take his Colchicine and Indomethacin as needed for the pain  The following portions of the patient's history were reviewed and updated as appropriate: past family history, past medical history, past social history, past surgical history and problem list     Review of Systems   Constitutional: Negative for appetite change, chills, diaphoresis, fatigue, fever and unexpected weight change  HENT: Negative for sore throat and trouble swallowing  Eyes: Negative for visual disturbance  Respiratory: Negative for cough, chest tightness and shortness of breath  Cardiovascular: Negative for chest pain, palpitations and leg swelling  Gastrointestinal: Negative for abdominal distention, abdominal pain, blood in stool, constipation, diarrhea, nausea and vomiting  Genitourinary: Negative for difficulty urinating, frequency, hematuria and urgency  Musculoskeletal: Positive for back pain and gait problem  Neurological: Negative for dizziness, light-headedness and headaches  Objective:      /68 (BP Location: Left arm, Patient Position: Sitting, Cuff Size: Standard)   Pulse (!) 50   Temp 97 6 °F (36 4 °C) (Tympanic)   Ht 5' 2 68" (1 592 m) Comment: with shoes  Wt 85 9 kg (189 lb 6 4 oz) Comment: with shoes  SpO2 97%   BMI 33 90 kg/m²          Physical Exam   Constitutional: He is oriented to person, place, and time  He appears well-developed and well-nourished  No distress  Eyes: Pupils are equal, round, and reactive to light  Conjunctivae and EOM are normal    Cardiovascular: Normal rate, regular rhythm, normal heart sounds and intact distal pulses     Pulmonary/Chest: Effort normal and breath sounds normal  No respiratory distress  Abdominal: Soft  Bowel sounds are normal  He exhibits no distension  There is no tenderness  Musculoskeletal: He exhibits no edema  Neurological: He is alert and oriented to person, place, and time  He has normal strength  No cranial nerve deficit or sensory deficit  Skin: Skin is warm and dry  Capillary refill takes 2 to 3 seconds  He is not diaphoretic  Psychiatric: He has a normal mood and affect   His behavior is normal

## 2019-10-15 NOTE — ASSESSMENT & PLAN NOTE
Well controlled on home medication; patient endorses compliance  BP in office was 117/68     · Continue Carvedilol 25 mg PO b i d

## 2019-10-15 NOTE — PROGRESS NOTES
Assessment/Plan:    No problem-specific Assessment & Plan notes found for this encounter  Diagnoses and all orders for this visit:    Need for influenza vaccination  -     influenza vaccine, 0636-2377, high-dose, PF 0 5 mL (FLUZONE HIGH-DOSE)    Benign essential HTN    Benign prostatic hyperplasia with urinary frequency    Depression, unspecified depression type    Gastroesophageal reflux disease without esophagitis    Lumbar pain    Abnormal glucose    Acute idiopathic gout of multiple sites          Subjective: No chief complaint on file  Patient ID: Trinh Gibson is a 66 y o  male      HPI    The following portions of the patient's history were reviewed and updated as appropriate: allergies, current medications, past family history, past medical history, past social history, past surgical history and problem list     Review of Systems      Past Medical History:   Diagnosis Date    Actinic keratosis     RESOLVED: [id number]    Adolescent idiopathic scoliosis of thoracolumbar region 4/12/2018    Basal cell carcinoma of back     RESOLVED: [id number]    Basal cell carcinoma of skin of upper extremity, including shoulder     BCC (basal cell carcinoma), trunk     BCE (basal cell epithelioma), trunk     RESOLVED: [id number]    Benign neoplasm of skin of face     Coronary artery disease involving native coronary artery of native heart without angina pectoris 10/16/2018    Depression 10/6/2015    Gastroesophageal reflux disease without esophagitis 4/21/2017    Hypertension     Neoplasm of uncertain behavior of skin     Nonmelanoma skin cancer     LAST ASSESSED: [id number]    Seborrheic keratosis     RESOLVED: [id number]         Current Outpatient Medications:     aspirin 81 MG tablet, Take 1 tablet by mouth every other day, Disp: , Rfl:     carvedilol (COREG) 25 mg tablet, Take 1 tablet (25 mg total) by mouth 2 (two) times a day with meals, Disp: 180 tablet, Rfl: 0    colchicine (COLCRYS) 0 6 mg tablet, Take 1 tablet (0 6 mg total) by mouth 2 (two) times a day, Disp: 60 tablet, Rfl: 0    gabapentin (NEURONTIN) 100 mg capsule, Take 1 capsule (100 mg total) by mouth 2 (two) times a day, Disp: 180 capsule, Rfl: 1    indomethacin (INDOCIN) 50 mg capsule, Take 1 capsule (50 mg total) by mouth 2 (two) times a day with meals, Disp: 60 capsule, Rfl: 0    metFORMIN (GLUCOPHAGE) 500 mg tablet, Take 1 tablet (500 mg total) by mouth daily, Disp: 90 tablet, Rfl: 1    methocarbamol (ROBAXIN) 500 mg tablet, Take 1 tablet (500 mg total) by mouth daily at bedtime as needed for muscle spasms, Disp: 30 tablet, Rfl: 0    rosuvastatin (CRESTOR) 10 MG tablet, Take 1 tablet (10 mg total) by mouth daily at bedtime, Disp: 90 tablet, Rfl: 0    Allergies   Allergen Reactions    Other      [place] trees       Social History   Past Surgical History:   Procedure Laterality Date    ANAL FISTULOTOMY  06/13/2011    DR SANCHEZ    CORONARY ARTERY BYPASS GRAFT  05/17/2005    TRIPLE; DR Weiner [facility]GERY  10/2009      [address] [place] [address] SURGERY  06/18/2015    JOINT REPLACEMENT; DR Laine Rodriguez     Family History   Problem Relation Age of Onset    Other Mother         BRAIN TUMOR    No Known Problems Father     Vascular Disease Brother     Cancer Brother        Objective: There were no vitals taken for this visit      Recent Results (from the past 1344 hour(s))   Comprehensive metabolic panel    Collection Time: 08/22/19 12:15 PM   Result Value Ref Range    Glucose, Random 116 (H) 65 - 99 mg/dL    BUN 17 8 - 27 mg/dL    Creatinine 1 36 (H) 0 76 - 1 27 mg/dL    eGFR Non African American 50 (L) >59 mL/min/1 73    eGFR  58 (L) >59 mL/min/1 73    SL AMB BUN/CREATININE RATIO 13 10 - 24    Sodium 142 134 - 144 mmol/L    Potassium 5 2 3 5 - 5 2 mmol/L    Chloride 105 96 - 106 mmol/L    CO2 23 20 - 29 mmol/L    CALCIUM 9 6 8 6 - 10 2 mg/dL    Protein, Total 6 3 6 0 - 8 5 g/dL Albumin 4 2 3 5 - 4 8 g/dL    Globulin, Total 2 1 1 5 - 4 5 g/dL    Albumin/Globulin Ratio 2 0 1 2 - 2 2    TOTAL BILIRUBIN 0 4 0 0 - 1 2 mg/dL    Alk Phos Isoenzymes 52 39 - 117 IU/L    AST 19 0 - 40 IU/L    ALT 27 0 - 44 IU/L   Lipid panel    Collection Time: 08/22/19 12:15 PM   Result Value Ref Range    Cholesterol, Total 111 100 - 199 mg/dL    Triglycerides 79 0 - 149 mg/dL    HDL 54 >39 mg/dL    VLDL Cholesterol Calculated 16 5 - 40 mg/dL    LDL Direct 41 0 - 99 mg/dL   Hemoglobin A1c (w/out EAG)    Collection Time: 08/22/19 12:15 PM   Result Value Ref Range    Hemoglobin A1C 6 2 (H) 4 8 - 5 6 %   Vitamin D 25 hydroxy    Collection Time: 08/22/19 12:15 PM   Result Value Ref Range    25-HYDROXY VIT D 34 8 30 0 - 100 0 ng/mL            Physical Exam

## 2019-10-15 NOTE — ASSESSMENT & PLAN NOTE
Patient states his pain has gotten worse in the past 5-6 months  He has new gait imbalance and bilateral knee and leg pain  Denies any bowel/bladder incontinence  Per patient, he was told he has a herniated disc in that region  Follows an orthopedist as an outpatient and has an MRI scheduled for this Friday    · Continue to follow with outpatient specialist

## 2019-10-15 NOTE — ASSESSMENT & PLAN NOTE
Last HgbA1c on 8/22 elevated at 6 2  Patient was prescribed Meformin at last visit but states he stopped taking it as he would like to control blood sugar with diet and exercise  States he has reduced portion sizes, walks 1 mile a day, and will go to the gym 3 times per week    · Discontinue Metformin for the time being  · Continue with regular exercise and dietary modifications  · Recheck HgbA1c for next follow up visit

## 2019-10-15 NOTE — ASSESSMENT & PLAN NOTE
Patient states he has not had any symptoms recently as long as he watches what he eats  He is aware of what foods he needs to avoid  He does not take his Colchicine or Indomethacin regularly but rather takes them as needed when he feels pain   His last CMP from 8/22 showed Cr elevated at 1 36   · Continue Colchicine and Indomethacin as needed for pain  · Patient counseled on maintaining proper hydration   · Will check urinalysis and CMP for next follow up visit

## 2019-11-09 DIAGNOSIS — M10.09 ACUTE IDIOPATHIC GOUT OF MULTIPLE SITES: ICD-10-CM

## 2019-11-09 DIAGNOSIS — M54.50 LUMBAR PAIN: ICD-10-CM

## 2019-11-09 DIAGNOSIS — R73.09 ABNORMAL GLUCOSE: ICD-10-CM

## 2019-11-11 RX ORDER — GABAPENTIN 100 MG/1
CAPSULE ORAL
Qty: 180 CAPSULE | Refills: 1 | OUTPATIENT
Start: 2019-11-11

## 2019-11-14 ENCOUNTER — TELEPHONE (OUTPATIENT)
Dept: CARDIOLOGY CLINIC | Facility: CLINIC | Age: 74
End: 2019-11-14

## 2019-11-14 ENCOUNTER — OFFICE VISIT (OUTPATIENT)
Dept: CARDIOLOGY CLINIC | Facility: CLINIC | Age: 74
End: 2019-11-14
Payer: COMMERCIAL

## 2019-11-14 VITALS
HEART RATE: 54 BPM | DIASTOLIC BLOOD PRESSURE: 78 MMHG | BODY MASS INDEX: 33.96 KG/M2 | HEIGHT: 63 IN | WEIGHT: 191.7 LBS | SYSTOLIC BLOOD PRESSURE: 142 MMHG

## 2019-11-14 DIAGNOSIS — I25.10 CORONARY ARTERY DISEASE INVOLVING NATIVE CORONARY ARTERY OF NATIVE HEART WITHOUT ANGINA PECTORIS: ICD-10-CM

## 2019-11-14 DIAGNOSIS — I10 BENIGN ESSENTIAL HTN: ICD-10-CM

## 2019-11-14 DIAGNOSIS — E78.00 HYPERCHOLESTEROLEMIA: ICD-10-CM

## 2019-11-14 DIAGNOSIS — Z01.810 PREOPERATIVE CARDIOVASCULAR EXAMINATION: Primary | ICD-10-CM

## 2019-11-14 DIAGNOSIS — Z01.810 PREOP CARDIOVASCULAR EXAM: ICD-10-CM

## 2019-11-14 DIAGNOSIS — R00.1 BRADYCARDIA: ICD-10-CM

## 2019-11-14 PROCEDURE — 99214 OFFICE O/P EST MOD 30 MIN: CPT | Performed by: INTERNAL MEDICINE

## 2019-11-14 PROCEDURE — 93000 ELECTROCARDIOGRAM COMPLETE: CPT | Performed by: INTERNAL MEDICINE

## 2019-11-14 NOTE — PROGRESS NOTES
Cardiology Follow Up    Sejal Avaloshrie  1945  [id number]  [facility]  [address] [facility] [address]    1  Preoperative cardiovascular examination  POCT ECG   2  Benign essential HTN  POCT ECG   3  Coronary artery disease involving native coronary artery of native heart without angina pectoris     4  Bradycardia     5  Hypercholesterolemia     6  Preop cardiovascular exam         Discussion/Summary:  Overall he is doing well from a cardiac standpoint  He has upcoming back surgery and has moderate cardiac risk  No further testing is required  Good functional capacity greater than 4 METs  Coronary disease stable with no angina  Blood pressure is slightly elevated today in the office but overall has been well controlled will continue to follow  Lipids have been doing very well as current dose of statin  He has sinus bradycardia that has been stable on his current medical regimen  No further testing follow-up with him in 8 months  Carotids will be ordered next year    Interval History:   68-year-old gentle with a history of coronary artery disease status post CABG 10 years ago presents for a routine scheduled follow-up visit  He also has a history of hypertension, hyperlipidemia, mild carotid plaque  He has been rather sedentary since her last visit  He suffers from chronic back pain he presents today for preoperative risk assessment for spine surgery  Coronary disease stable with no complaints of angina  Functional capacity is quite good  He walks a mi every day with his dogs in the morning and works out at [employer] several days a week  He denies any chest pain, shortness of breath, palpitations, lightheadedness, dizziness, or syncope  There has been no lower extremity edema, PND, orthopnea    Problem List     Myalgia    Polyarthralgia    Fatigue    Low vitamin D level    Lumbar pain    Adolescent idiopathic scoliosis of thoracolumbar region    Abnormal glucose    Benign essential HTN    BPH (benign prostatic hyperplasia)    Depression    Gastroesophageal reflux disease without esophagitis    Hypercholesterolemia    Scoliosis    Screening for malignant neoplasm of prostate    Coronary artery disease involving native coronary artery of native heart without angina pectoris        Past Medical History:   Diagnosis Date    Actinic keratosis     RESOLVED: [id number]    Adolescent idiopathic scoliosis of thoracolumbar region 4/12/2018    Basal cell carcinoma of back     RESOLVED: [id number]    Basal cell carcinoma of skin of upper extremity, including shoulder     BCC (basal cell carcinoma), trunk     BCE (basal cell epithelioma), trunk     RESOLVED: [id number]    Benign neoplasm of skin of face     Coronary artery disease involving native coronary artery of native heart without angina pectoris 10/16/2018    Depression 10/6/2015    Gastroesophageal reflux disease without esophagitis 4/21/2017    Hypertension     Neoplasm of uncertain behavior of skin     Nonmelanoma skin cancer     LAST ASSESSED: [id number]    Seborrheic keratosis     RESOLVED: [id number]     Social History     Socioeconomic History    Marital status: /Civil Union     Spouse name: Not on file    Number of children: Not on file    Years of education: Not on file    Highest education level: Not on file   Occupational History    Not on file   Social Needs    Financial resource strain: Not on file    Food insecurity:     Worry: Not on file     Inability: Not on file    Transportation needs:     Medical: Not on file     Non-medical: Not on file   Tobacco Use    Smoking status: Never Smoker    Smokeless tobacco: Never Used   Substance and Sexual Activity    Alcohol use: Yes     Comment: SOCIAL     Drug use: No    Sexual activity: Not Currently   Lifestyle    Physical activity:     Days per week: Not on file     Minutes per session: Not on file    Stress: Not on file   Relationships    Social connections:     Talks on phone: Not on file     Gets together: Not on file     Attends [employer] service: Not on file     Active member of club or organization: Not on file     Attends meetings of clubs or organizations: Not on file     Relationship status: Not on file    Intimate partner violence:     Fear of current or ex partner: Not on file     Emotionally abused: Not on file     Physically abused: Not on file     Forced sexual activity: Not on file   Other Topics Concern    Not on file   Social History Narrative    Not on file      Family History   Problem Relation Age of Onset    Other Mother         BRAIN TUMOR    No Known Problems Father     Vascular Disease Brother     Cancer Brother      Past Surgical History:   Procedure Laterality Date    ANAL FISTULOTOMY  06/13/2011    DR SANCHEZ    CORONARY ARTERY BYPASS GRAFT  05/17/2005    TRIPLE;   [address]  10/2009       [address] [place] [address] SURGERY  06/18/2015    JOINT REPLACEMENT; DR Tanisha Bowden       Current Outpatient Medications:     aspirin 81 MG tablet, Take 1 tablet by mouth every other day, Disp: , Rfl:     carvedilol (COREG) 25 mg tablet, Take 1 tablet (25 mg total) by mouth 2 (two) times a day with meals, Disp: 180 tablet, Rfl: 0    Multiple Vitamins-Minerals (MULTIVITAMIN ADULT PO), Take 1 tablet by mouth daily, Disp: , Rfl:     rosuvastatin (CRESTOR) 10 MG tablet, Take 1 tablet (10 mg total) by mouth daily at bedtime, Disp: 90 tablet, Rfl: 0    colchicine (COLCRYS) 0 6 mg tablet, Take 1 tablet (0 6 mg total) by mouth 2 (two) times a day (Patient not taking: Reported on 11/14/2019), Disp: 60 tablet, Rfl: 0    indomethacin (INDOCIN) 50 mg capsule, Take 1 capsule (50 mg total) by mouth 2 (two) times a day with meals (Patient not taking: Reported on 11/14/2019), Disp: 60 capsule, Rfl: 0  Allergies Allergen Reactions    Other      [place] trees       Labs:     Chemistry        Component Value Date/Time     07/21/2015 0954    K 5 2 08/22/2019 1215     08/22/2019 1215    CO2 23 08/22/2019 1215    BUN 17 08/22/2019 1215    CREATININE 1 36 (H) 08/22/2019 1215    CREATININE 1 38 (H) 05/20/2016 1035    CREATININE 1 43 (H) 07/21/2015 0954        Component Value Date/Time    CALCIUM 8 4 05/20/2016 1035    CALCIUM 8 8 07/21/2015 0954    ALKPHOS 58 05/20/2016 1035    ALKPHOS 77 07/21/2015 0954    AST 19 08/22/2019 1215    ALT 27 08/22/2019 1215    BILITOT 0 53 07/21/2015 0954            Lab Results   Component Value Date    CHOL 142 12/16/2015    CHOL 231 07/21/2015     Lab Results   Component Value Date    HDL 54 08/22/2019    HDL 49 04/23/2019    HDL 47 10/15/2018     Lab Results   Component Value Date    LDLCALC 121 (H) 05/20/2016    LDLCALC 53 12/16/2015    LDLCALC 143 (H) 07/21/2015     Lab Results   Component Value Date    TRIG 79 08/22/2019    TRIG 146 04/23/2019    TRIG 181 (H) 10/15/2018     No results found for: CHOLHDL    Imaging: No results found  ECG:  Sinus bradycardia inferior infarct age indeterminate      ROS    Vitals:    11/14/19 0845   BP: 142/78   Pulse: (!) 54     Vitals:    11/14/19 0845   Weight: 87 kg (191 lb 11 2 oz)     Height: 5' 3" (160 cm)   Body mass index is 33 96 kg/m²    Physical Exam:  General:  Alert and cooperative, appears stated age  HEENT:  PERRLA, EOMI, no scleral icterus, no conjunctival pallor  Neck:  No lymphadenopathy, no thyromegaly, no carotid bruits, no elevated JVP  Heart:  Regular rate and rhythm, normal S1/S2, no S3/S4, no murmur  Lungs:  Clear to auscultation bilaterally   Abdomen:  Soft, non-tender, positive bowel sounds, no rebound or guarding,   no organomegaly   Extremities:  No clubbing, cyanosis or edema   Vascular:  2+ pedal pulses  Skin:  No rashes or lesions on exposed skin  Neurologic:  Cranial nerves II-XII grossly intact without focal deficits

## 2019-11-25 ENCOUNTER — CONSULT (OUTPATIENT)
Dept: INTERNAL MEDICINE CLINIC | Facility: CLINIC | Age: 74
End: 2019-11-25
Payer: COMMERCIAL

## 2019-11-25 VITALS
OXYGEN SATURATION: 98 % | HEART RATE: 62 BPM | DIASTOLIC BLOOD PRESSURE: 68 MMHG | SYSTOLIC BLOOD PRESSURE: 140 MMHG | BODY MASS INDEX: 34.67 KG/M2 | HEIGHT: 62 IN | TEMPERATURE: 98.1 F | WEIGHT: 188.4 LBS

## 2019-11-25 DIAGNOSIS — I25.10 CORONARY ARTERY DISEASE INVOLVING NATIVE CORONARY ARTERY OF NATIVE HEART WITHOUT ANGINA PECTORIS: ICD-10-CM

## 2019-11-25 DIAGNOSIS — I10 BENIGN ESSENTIAL HTN: ICD-10-CM

## 2019-11-25 DIAGNOSIS — M54.50 LUMBAR PAIN: ICD-10-CM

## 2019-11-25 DIAGNOSIS — R35.0 BENIGN PROSTATIC HYPERPLASIA WITH URINARY FREQUENCY: ICD-10-CM

## 2019-11-25 DIAGNOSIS — M10.09 ACUTE IDIOPATHIC GOUT OF MULTIPLE SITES: ICD-10-CM

## 2019-11-25 DIAGNOSIS — N40.1 BENIGN PROSTATIC HYPERPLASIA WITH URINARY FREQUENCY: ICD-10-CM

## 2019-11-25 DIAGNOSIS — Z01.818 PRE-OP EXAMINATION: Primary | ICD-10-CM

## 2019-11-25 DIAGNOSIS — R00.1 BRADYCARDIA: ICD-10-CM

## 2019-11-25 PROBLEM — Z01.810 PREOP CARDIOVASCULAR EXAM: Status: RESOLVED | Noted: 2019-11-14 | Resolved: 2019-11-25

## 2019-11-25 PROBLEM — Z12.5 SCREENING FOR MALIGNANT NEOPLASM OF PROSTATE: Status: RESOLVED | Noted: 2018-05-10 | Resolved: 2019-11-25

## 2019-11-25 PROCEDURE — 99214 OFFICE O/P EST MOD 30 MIN: CPT | Performed by: NURSE PRACTITIONER

## 2019-11-25 NOTE — PATIENT INSTRUCTIONS
Cleared for surgery  Return for any concerns prior to appt - CP/SOB  Signs or symptoms of infection  No Aspirin starting today    Continue all other medications

## 2019-11-25 NOTE — PROGRESS NOTES
Assessment/Plan:    66 y o  male with planned surgery as noted below  Known risk factors for perioperative complications: Coronary disease      Nati Mi is cleared from a cardiovascular standpoint to proceed with surgery  he is at a low risk from a cardiovascular standpoint at this time without any additional cardiac testing  Reevaluation needed if he should present with symptoms prior to surgery  CAD:  ASA, BB  Pt aware to hold ASA starting today - 1 week prior to surgery  HTN:  Fairly controlled, slight elevation today  GOUT:  Controlled  Flare if eats certain foods  Aware not to take indometacin if flare prior to surgery  Indomethacin and colchicine only prn  BPH:  Denies s/sx  But does report hx of post-op urinary retention, requiring catheter at times  flomax may be beneficial post-op  HLD:  On statin    Reviewed labs  Creat slight elevated @ 1 4, however prior was 1 36   platlets slightly elevated @ 543, however prior was 409     Diagnoses and all orders for this visit:    Pre-op examination    Lumbar pain    Benign essential HTN    Coronary artery disease involving native coronary artery of native heart without angina pectoris    Benign prostatic hyperplasia with urinary frequency    Bradycardia    Acute idiopathic gout of multiple sites        Subjective:     Nati Mi is a 66y o  year old male who presents to the office today for a preoperative consultation at the request of surgeon Dr Vanna Corado who plans on performing decompression possible psf L4-S1 on 12/2/19  Planned anesthesia: general  The patient has the following known anesthesia issues: post op urinary retention, often requiring catheter  Patients bleeding risk: no recent abnormal bleeding, no remote history of abnormal bleeding and no use of Ca-channel blockers  Patient is able to walk 4 blocks without symptoms  Patient is able to walk up 2 flights of stairs without symptoms       Significant past medical history includes CAD, CABG, HTN, gout, BPH, GERD  Patient with no past medical history of CHF, DVT, PE, diabetes, insulin use, thyroid disease, seizure disorder, CVA, asthma, COPD, EZEKIEL, dentures     Tobacco use: no  Alcohol use: occassional - few times a month  Illicit drug use: no    Symptoms:   Easy bleeding: no  Easy bruising: no  Frequent nose bleeds: no  Chest pain: no  Cough: no  Dyspnea on exertion:no  Edema: no  Palpitations: no  Wheezing: no    Living situation: Patient lives with his wife in a single floor apartment  She will be caring for him after surgery  hedoes not have post-op concerns  The following portions of the patient's history were reviewed and updated as appropriate: allergies, current medications, past family history, past medical history, past social history, past surgical history and problem list     Review of Systems   Constitutional: Negative for chills, fatigue and fever  HENT: Negative for congestion, postnasal drip, sinus pressure, sinus pain and sore throat  Respiratory: Negative for cough, chest tightness, shortness of breath and wheezing  Cardiovascular: Negative for chest pain and palpitations  Gastrointestinal: Positive for constipation  Negative for abdominal pain, blood in stool, diarrhea, nausea and vomiting  Genitourinary: Negative for dysuria, frequency, hematuria and urgency  Musculoskeletal: Positive for arthralgias and back pain  Skin: Negative for rash  Allergic/Immunologic: Positive for environmental allergies  Neurological: Negative for dizziness, syncope, light-headedness and headaches  Psychiatric/Behavioral: Negative for dysphoric mood  The patient is not nervous/anxious            Past Medical History:   Diagnosis Date    Actinic keratosis     RESOLVED: [id number]    Adolescent idiopathic scoliosis of thoracolumbar region 4/12/2018    Basal cell carcinoma of back     RESOLVED: [id number]    Basal cell carcinoma of skin of upper extremity, including shoulder     BCC (basal cell carcinoma), trunk     BCE (basal cell epithelioma), trunk     RESOLVED: [id number]    Benign neoplasm of skin of face     Coronary artery disease involving native coronary artery of native heart without angina pectoris 10/16/2018    Depression 10/6/2015    Gastroesophageal reflux disease without esophagitis 4/21/2017    Hypertension     Neoplasm of uncertain behavior of skin     Nonmelanoma skin cancer     LAST ASSESSED: [id number]    Seborrheic keratosis     RESOLVED: [id number]         Current Outpatient Medications:     aspirin 81 MG tablet, Take 1 tablet by mouth every other day, Disp: , Rfl:     carvedilol (COREG) 25 mg tablet, Take 1 tablet (25 mg total) by mouth 2 (two) times a day with meals, Disp: 180 tablet, Rfl: 0    indomethacin (INDOCIN) 50 mg capsule, Take 1 capsule (50 mg total) by mouth 2 (two) times a day with meals, Disp: 60 capsule, Rfl: 0    Multiple Vitamins-Minerals (MULTIVITAMIN ADULT PO), Take 1 tablet by mouth daily, Disp: , Rfl:     rosuvastatin (CRESTOR) 10 MG tablet, Take 1 tablet (10 mg total) by mouth daily at bedtime, Disp: 90 tablet, Rfl: 0    colchicine (COLCRYS) 0 6 mg tablet, Take 1 tablet (0 6 mg total) by mouth 2 (two) times a day (Patient not taking: Reported on 11/14/2019), Disp: 60 tablet, Rfl: 0    Allergies   Allergen Reactions    Other      [place] trees       Social History   Past Surgical History:   Procedure Laterality Date    ANAL FISTULOTOMY  06/13/2011    DR SANCHEZ    CORONARY ARTERY BYPASS GRAFT  05/17/2005    TRIPLE;   [address] [facility] SURGERY  10/2009    [facility]  06/18/2015    JOINT REPLACEMENT; DR Amanda Jimenez     Family History   Problem Relation Age of Onset    Other Mother         BRAIN TUMOR    No Known Problems Father     Vascular Disease Brother     Cancer Brother        Objective:  /68 (BP Location: Left arm, Patient Position: Sitting, Cuff Size: Adult)   Pulse 62   Temp 98 1 °F (36 7 °C) (Oral)   Ht 5' 2 01" (1 575 m)   Wt 85 5 kg (188 lb 6 4 oz)   SpO2 98% Comment: room air  BMI 34 45 kg/m²     Cardiographics  ECG: SB:  54   Reviewed by [facility]    Imaging  none    Lab Review   CBC, BMP:   11/20/2019     Physical Exam   Constitutional: He is oriented to person, place, and time  He appears well-developed and well-nourished  No distress  HENT:   Head: Normocephalic and atraumatic  Right Ear: Hearing, tympanic membrane, external ear and ear canal normal    Left Ear: Hearing, tympanic membrane, external ear and ear canal normal    Nose: Nose normal  No mucosal edema or rhinorrhea  Mouth/Throat: Uvula is midline, oropharynx is clear and moist and mucous membranes are normal  No oropharyngeal exudate  Neck: No thyromegaly present  Cardiovascular: Normal rate and regular rhythm  No pedal edema   Pulmonary/Chest: Effort normal and breath sounds normal  No respiratory distress  He has no wheezes  Abdominal: Soft  Bowel sounds are normal  He exhibits no distension  Lymphadenopathy:     He has no cervical adenopathy  Neurological: He is alert and oriented to person, place, and time  No focal deficits   Skin: Skin is warm and dry  Psychiatric: He has a normal mood and affect  His behavior is normal  Judgment and thought content normal    Nursing note and vitals reviewed

## 2019-12-08 DIAGNOSIS — E78.00 HYPERCHOLESTEROLEMIA: ICD-10-CM

## 2019-12-09 RX ORDER — ROSUVASTATIN CALCIUM 10 MG/1
TABLET, COATED ORAL
Qty: 90 TABLET | Refills: 0 | OUTPATIENT
Start: 2019-12-09

## 2019-12-09 RX ORDER — CARVEDILOL 25 MG/1
TABLET ORAL
Qty: 180 TABLET | Refills: 0 | OUTPATIENT
Start: 2019-12-09

## 2020-01-30 DIAGNOSIS — E78.00 HYPERCHOLESTEROLEMIA: ICD-10-CM

## 2020-01-30 RX ORDER — ROSUVASTATIN CALCIUM 10 MG/1
10 TABLET, COATED ORAL
Qty: 90 TABLET | Refills: 0 | Status: SHIPPED | OUTPATIENT
Start: 2020-01-30 | End: 2020-05-15 | Stop reason: SDUPTHER

## 2020-02-20 LAB
ALBUMIN SERPL-MCNC: 3.9 G/DL (ref 3.7–4.7)
ALBUMIN/GLOB SERPL: 1.5 {RATIO} (ref 1.2–2.2)
ALP SERPL-CCNC: 68 IU/L (ref 39–117)
ALT SERPL-CCNC: 21 IU/L (ref 0–44)
APPEARANCE UR: CLEAR
AST SERPL-CCNC: 21 IU/L (ref 0–40)
BACTERIA URNS QL MICRO: NORMAL
BASOPHILS # BLD AUTO: 0 X10E3/UL (ref 0–0.2)
BASOPHILS NFR BLD AUTO: 0 %
BILIRUB SERPL-MCNC: 0.4 MG/DL (ref 0–1.2)
BILIRUB UR QL STRIP: NEGATIVE
BUN SERPL-MCNC: 16 MG/DL (ref 8–27)
BUN/CREAT SERPL: 12 (ref 10–24)
CALCIUM SERPL-MCNC: 9.2 MG/DL (ref 8.6–10.2)
CHLORIDE SERPL-SCNC: 103 MMOL/L (ref 96–106)
CO2 SERPL-SCNC: 23 MMOL/L (ref 20–29)
COLOR UR: YELLOW
CREAT SERPL-MCNC: 1.29 MG/DL (ref 0.76–1.27)
EOSINOPHIL # BLD AUTO: 0.2 X10E3/UL (ref 0–0.4)
EOSINOPHIL NFR BLD AUTO: 3 %
EPI CELLS #/AREA URNS HPF: NORMAL /HPF (ref 0–10)
ERYTHROCYTE [DISTWIDTH] IN BLOOD BY AUTOMATED COUNT: 14.7 % (ref 11.6–15.4)
EST. AVERAGE GLUCOSE BLD GHB EST-MCNC: 123 MG/DL
GLOBULIN SER-MCNC: 2.6 G/DL (ref 1.5–4.5)
GLUCOSE SERPL-MCNC: 104 MG/DL (ref 65–99)
GLUCOSE UR QL: NEGATIVE
HBA1C MFR BLD: 5.9 % (ref 4.8–5.6)
HCT VFR BLD AUTO: 43.6 % (ref 37.5–51)
HCV AB S/CO SERPL IA: <0.1 S/CO RATIO (ref 0–0.9)
HGB BLD-MCNC: 14.7 G/DL (ref 13–17.7)
HGB UR QL STRIP: NEGATIVE
IMM GRANULOCYTES # BLD: 0 X10E3/UL (ref 0–0.1)
IMM GRANULOCYTES NFR BLD: 0 %
KETONES UR QL STRIP: NEGATIVE
LEUKOCYTE ESTERASE UR QL STRIP: NEGATIVE
LYMPHOCYTES # BLD AUTO: 2.1 X10E3/UL (ref 0.7–3.1)
LYMPHOCYTES NFR BLD AUTO: 31 %
MCH RBC QN AUTO: 29.5 PG (ref 26.6–33)
MCHC RBC AUTO-ENTMCNC: 33.7 G/DL (ref 31.5–35.7)
MCV RBC AUTO: 88 FL (ref 79–97)
MICRO URNS: NORMAL
MICRO URNS: NORMAL
MONOCYTES # BLD AUTO: 0.4 X10E3/UL (ref 0.1–0.9)
MONOCYTES NFR BLD AUTO: 6 %
MUCOUS THREADS URNS QL MICRO: PRESENT
NEUTROPHILS # BLD AUTO: 3.9 X10E3/UL (ref 1.4–7)
NEUTROPHILS NFR BLD AUTO: 60 %
NITRITE UR QL STRIP: NEGATIVE
PH UR STRIP: 5.5 [PH] (ref 5–7.5)
PLATELET # BLD AUTO: 504 X10E3/UL (ref 150–450)
POTASSIUM SERPL-SCNC: 4.7 MMOL/L (ref 3.5–5.2)
PROT SERPL-MCNC: 6.5 G/DL (ref 6–8.5)
PROT UR QL STRIP: NEGATIVE
PSA SERPL-MCNC: 2.2 NG/ML (ref 0–4)
RBC # BLD AUTO: 4.98 X10E6/UL (ref 4.14–5.8)
RBC #/AREA URNS HPF: NORMAL /HPF (ref 0–2)
SL AMB EGFR AFRICAN AMERICAN: 62 ML/MIN/1.73
SL AMB EGFR NON AFRICAN AMERICAN: 53 ML/MIN/1.73
SL AMB REFLEX CRITERIA: NORMAL
SL AMB URINALYSIS REFLEX: NORMAL
SODIUM SERPL-SCNC: 142 MMOL/L (ref 134–144)
SP GR UR: 1.02 (ref 1–1.03)
TSH SERPL DL<=0.005 MIU/L-ACNC: 3.53 UIU/ML (ref 0.45–4.5)
UROBILINOGEN UR STRIP-ACNC: 0.2 MG/DL (ref 0.2–1)
WBC # BLD AUTO: 6.6 X10E3/UL (ref 3.4–10.8)
WBC #/AREA URNS HPF: NORMAL /HPF (ref 0–5)

## 2020-03-09 ENCOUNTER — OFFICE VISIT (OUTPATIENT)
Dept: INTERNAL MEDICINE CLINIC | Facility: CLINIC | Age: 75
End: 2020-03-09
Payer: COMMERCIAL

## 2020-03-09 VITALS
BODY MASS INDEX: 34.45 KG/M2 | WEIGHT: 194.4 LBS | OXYGEN SATURATION: 96 % | DIASTOLIC BLOOD PRESSURE: 80 MMHG | HEIGHT: 63 IN | HEART RATE: 63 BPM | TEMPERATURE: 98.5 F | SYSTOLIC BLOOD PRESSURE: 142 MMHG

## 2020-03-09 DIAGNOSIS — R35.0 BENIGN PROSTATIC HYPERPLASIA WITH URINARY FREQUENCY: ICD-10-CM

## 2020-03-09 DIAGNOSIS — R79.89 LOW VITAMIN D LEVEL: ICD-10-CM

## 2020-03-09 DIAGNOSIS — I10 BENIGN ESSENTIAL HTN: Primary | ICD-10-CM

## 2020-03-09 DIAGNOSIS — R73.09 ABNORMAL GLUCOSE: ICD-10-CM

## 2020-03-09 DIAGNOSIS — M41.26 OTHER IDIOPATHIC SCOLIOSIS, LUMBAR REGION: ICD-10-CM

## 2020-03-09 DIAGNOSIS — N40.1 BENIGN PROSTATIC HYPERPLASIA WITH URINARY FREQUENCY: ICD-10-CM

## 2020-03-09 DIAGNOSIS — M54.50 LUMBAR PAIN: ICD-10-CM

## 2020-03-09 DIAGNOSIS — M10.09 ACUTE IDIOPATHIC GOUT OF MULTIPLE SITES: ICD-10-CM

## 2020-03-09 DIAGNOSIS — E78.00 HYPERCHOLESTEROLEMIA: ICD-10-CM

## 2020-03-09 DIAGNOSIS — D23.9 DYSPLASTIC NEVUS: ICD-10-CM

## 2020-03-09 PROBLEM — Z01.818 PRE-OP EXAMINATION: Status: RESOLVED | Noted: 2019-11-25 | Resolved: 2020-03-09

## 2020-03-09 PROBLEM — K21.9 GASTROESOPHAGEAL REFLUX DISEASE WITHOUT ESOPHAGITIS: Status: RESOLVED | Noted: 2017-04-21 | Resolved: 2020-03-09

## 2020-03-09 PROBLEM — R53.83 FATIGUE: Status: RESOLVED | Noted: 2018-02-27 | Resolved: 2020-03-09

## 2020-03-09 PROBLEM — M79.10 MYALGIA: Status: RESOLVED | Noted: 2018-02-27 | Resolved: 2020-03-09

## 2020-03-09 PROBLEM — R00.1 BRADYCARDIA: Status: RESOLVED | Noted: 2018-11-01 | Resolved: 2020-03-09

## 2020-03-09 PROBLEM — L85.3 DRY SKIN: Status: RESOLVED | Noted: 2019-05-13 | Resolved: 2020-03-09

## 2020-03-09 PROBLEM — M25.50 POLYARTHRALGIA: Status: RESOLVED | Noted: 2018-02-27 | Resolved: 2020-03-09

## 2020-03-09 PROCEDURE — 3077F SYST BP >= 140 MM HG: CPT | Performed by: FAMILY MEDICINE

## 2020-03-09 PROCEDURE — 3008F BODY MASS INDEX DOCD: CPT | Performed by: FAMILY MEDICINE

## 2020-03-09 PROCEDURE — 99214 OFFICE O/P EST MOD 30 MIN: CPT | Performed by: FAMILY MEDICINE

## 2020-03-09 PROCEDURE — 4040F PNEUMOC VAC/ADMIN/RCVD: CPT | Performed by: FAMILY MEDICINE

## 2020-03-09 PROCEDURE — 1160F RVW MEDS BY RX/DR IN RCRD: CPT | Performed by: FAMILY MEDICINE

## 2020-03-09 PROCEDURE — 1036F TOBACCO NON-USER: CPT | Performed by: FAMILY MEDICINE

## 2020-03-09 PROCEDURE — 3079F DIAST BP 80-89 MM HG: CPT | Performed by: FAMILY MEDICINE

## 2020-03-09 RX ORDER — GABAPENTIN 100 MG/1
100 CAPSULE ORAL 2 TIMES DAILY
Qty: 180 CAPSULE | Refills: 1 | Status: SHIPPED | OUTPATIENT
Start: 2020-03-09 | End: 2020-09-10

## 2020-03-09 NOTE — PROGRESS NOTES
Assessment/Plan:    1  Benign essential HTN    2  Abnormal glucose  -     gabapentin (NEURONTIN) 100 mg capsule; Take 1 capsule (100 mg total) by mouth 2 (two) times a day    3  Lumbar pain  -     gabapentin (NEURONTIN) 100 mg capsule; Take 1 capsule (100 mg total) by mouth 2 (two) times a day    4  Acute idiopathic gout of multiple sites  -     gabapentin (NEURONTIN) 100 mg capsule; Take 1 capsule (100 mg total) by mouth 2 (two) times a day    5  Benign prostatic hyperplasia with urinary frequency    6  Hypercholesterolemia  -     Lipid panel; Future  -     Comprehensive metabolic panel; Future  -     CK; Future    7  Low vitamin D level  -     Vitamin D 25 hydroxy; Future    8  Other idiopathic scoliosis, lumbar region      BMI Counseling: Body mass index is 34 15 kg/m²  The BMI is above normal  Nutrition recommendations include moderation in carbohydrate intake  Exercise recommendations include exercising 3-5 times per week  No pharmacotherapy was ordered  lab work reviewed, improvement in blood sugar levels  No change in lab work  Repeat in 6 months  Schedule skin biopsy for atypical lesion in left lower back  There are no Patient Instructions on file for this visit  Return in about 6 months (around 9/9/2020) for Recheck  Subjective:      Patient ID: Tariq Fleming is a 66 y o  male  Chief Complaint   Patient presents with    Follow-up     4 M f/u visit for hypertension, review labs       HPI     Hyperlipidemia (Follow-Up): The patient states his hyperlipidemia has been stable since the last visit  Comorbid Illnesses: hypertension  He has no significant interval events  Symptoms: The patient is currently asymptomatic  Associated symptoms include no focal neurologic deficits   Medications: The patient is not currently on any medications for his hyperlipidemia  The patient is not doing well with his hyperlipidemia goals  the patient's LDL goal is 80 mg/dL   The patient is due for a lipid panel       Hypertension (Follow-Up): The patient presents for follow-up of essential hypertension  The patient states he has been doing well with his blood pressure control since the last visit  He has no comorbid illnesses  He has no significant interval events  Symptoms: The patient is currently asymptomatic  Associated symptoms include no headache   Medications: The patient is not currently on any medications for his hypertension--lifestyle modification only   Disease Management: the patient is not doing well with his blood pressure goals       Coronary Artery Disease (Follow-Up): The patient states he has been doing well with his coronary artery disease symptoms since the last visit  Comorbid Illnesses: hypertension  Complications: MI    Symptoms: The patient is currently asymptomatic  His symptoms do not limit his activities  He is able to walk about 2 to 3 flights of stairs without symptoms  He denies nitroglycerin use since the last visit   Medications: The patient is not currently on any medications for his coronary artery disease  Was told increase water intake     Abnormal glucose, hemoglobin A1c 6 3  He states his son has diabetes but no family history in his mom and dad  Fasting blood sugar that day was 173  He is is not check his sugars at home and has not had problems in the past   On review laboratory data 2016 was normal for his blood sugar  March 2020, great improvement in lab work and hemoglobin A1c 5 9 now  Back pain, resolved  History of scoliosis as well as herniated discs and had surgery by Dr Kirsten Hernandez 3 months ago on December 2nd  He states he had the area fuse and now has no pain and does not take any pain medication  He is sleeping much better after surgery    He goes in the next few weeks to get clearance to go back to the gym           The following portions of the patient's history were reviewed and updated as appropriate: allergies, current medications, past family history, past medical history, past social history, past surgical history and problem list     Review of Systems      Constitutional:  Denies fever or chills   Eyes:  Denies double or blurry vision  HENT:  Denies nasal congestion or sore throat   Respiratory:  Denies cough or shortness of breath or wheezing  Cardiovascular:  Denies palpitations or chest pain  GI:  Denies abdominal pain, nausea, or vomiting  Integument:  Denies rash , no open areas  Neurologic:  Denies headache or focal weakness        Current Outpatient Medications   Medication Sig Dispense Refill    aspirin 81 MG tablet Take 1 tablet by mouth every other day      carvedilol (COREG) 25 mg tablet Take 1 tablet (25 mg total) by mouth 2 (two) times a day with meals 180 tablet 0    Multiple Vitamins-Minerals (MULTIVITAMIN ADULT PO) Take 1 tablet by mouth daily      rosuvastatin (CRESTOR) 10 MG tablet Take 1 tablet (10 mg total) by mouth daily at bedtime 90 tablet 0    colchicine (COLCRYS) 0 6 mg tablet Take 1 tablet (0 6 mg total) by mouth 2 (two) times a day (Patient not taking: Reported on 11/14/2019) 60 tablet 0    gabapentin (NEURONTIN) 100 mg capsule Take 1 capsule (100 mg total) by mouth 2 (two) times a day 180 capsule 1    indomethacin (INDOCIN) 50 mg capsule Take 1 capsule (50 mg total) by mouth 2 (two) times a day with meals (Patient not taking: Reported on 3/9/2020) 60 capsule 0     No current facility-administered medications for this visit          Objective:    /80 (BP Location: Left arm, Patient Position: Sitting, Cuff Size: Standard)   Pulse 63   Temp 98 5 °F (36 9 °C) (Oral)   Ht 5' 3 27" (1 607 m) Comment: with shoes  Wt 88 2 kg (194 lb 6 4 oz) Comment: with shoes  SpO2 96%   BMI 34 15 kg/m²        Physical Exam         Constitutional:  Well developed, well nourished, no acute distress, non-toxic appearance   Eyes:  PERRL, conjunctiva normal , non icteric sclera  HENT:  Atraumatic, oropharynx moist  Neck-  supple   Respiratory: CTA b/l, normal breath sounds, no rales, no wheezing   Cardiovascular:  RRR, no murmurs, no LE edema b/l  GI:  Soft, nondistended, normal bowel sounds x 4, nontender, no organomegaly, no mass, no rebound, no guarding   Neurologic:  no focal deficits noted   Psychiatric:  Speech and behavior appropriate , AAO x 3  Skin, hyper pigmented lesion approximately 4 mm left lower back  Midline incision healed well with no signs of infection      Denette Devoid, DO

## 2020-03-16 ENCOUNTER — TELEPHONE (OUTPATIENT)
Dept: INTERNAL MEDICINE CLINIC | Facility: CLINIC | Age: 75
End: 2020-03-16

## 2020-03-16 DIAGNOSIS — J01.00 ACUTE NON-RECURRENT MAXILLARY SINUSITIS: Primary | ICD-10-CM

## 2020-03-16 RX ORDER — AMOXICILLIN 500 MG/1
500 CAPSULE ORAL
Qty: 21 CAPSULE | Refills: 0 | Status: SHIPPED | OUTPATIENT
Start: 2020-03-16 | End: 2020-03-23

## 2020-03-16 NOTE — TELEPHONE ENCOUNTER
Pt has a sinus infection and would like to be prescribed amoxicillin  Pt has already taken 4 that were not  and would like to be given more  Pt was last seen 3/9/2020  Please call [phone] with any questions

## 2020-04-25 DIAGNOSIS — E78.00 HYPERCHOLESTEROLEMIA: ICD-10-CM

## 2020-04-27 RX ORDER — ROSUVASTATIN CALCIUM 10 MG/1
TABLET, COATED ORAL
Qty: 90 TABLET | Refills: 0 | OUTPATIENT
Start: 2020-04-27

## 2020-05-15 DIAGNOSIS — E78.00 HYPERCHOLESTEROLEMIA: ICD-10-CM

## 2020-05-15 RX ORDER — CARVEDILOL 25 MG/1
25 TABLET ORAL 2 TIMES DAILY WITH MEALS
Qty: 180 TABLET | Refills: 1 | Status: SHIPPED | OUTPATIENT
Start: 2020-05-15 | End: 2021-03-26 | Stop reason: SDUPTHER

## 2020-05-15 RX ORDER — ROSUVASTATIN CALCIUM 10 MG/1
10 TABLET, COATED ORAL
Qty: 90 TABLET | Refills: 1 | Status: SHIPPED | OUTPATIENT
Start: 2020-05-15 | End: 2020-12-21 | Stop reason: SDUPTHER

## 2020-07-22 ENCOUNTER — OFFICE VISIT (OUTPATIENT)
Dept: CARDIOLOGY CLINIC | Facility: CLINIC | Age: 75
End: 2020-07-22
Payer: COMMERCIAL

## 2020-07-22 VITALS
SYSTOLIC BLOOD PRESSURE: 146 MMHG | HEART RATE: 59 BPM | DIASTOLIC BLOOD PRESSURE: 74 MMHG | HEIGHT: 63 IN | BODY MASS INDEX: 35.15 KG/M2 | TEMPERATURE: 99 F | OXYGEN SATURATION: 97 % | WEIGHT: 198.4 LBS

## 2020-07-22 DIAGNOSIS — I10 BENIGN ESSENTIAL HTN: ICD-10-CM

## 2020-07-22 DIAGNOSIS — I65.23 CAROTID ARTERY STENOSIS, ASYMPTOMATIC, BILATERAL: ICD-10-CM

## 2020-07-22 DIAGNOSIS — E78.00 HYPERCHOLESTEROLEMIA: ICD-10-CM

## 2020-07-22 DIAGNOSIS — I25.10 CORONARY ARTERY DISEASE INVOLVING NATIVE CORONARY ARTERY OF NATIVE HEART WITHOUT ANGINA PECTORIS: Primary | ICD-10-CM

## 2020-07-22 PROCEDURE — 3077F SYST BP >= 140 MM HG: CPT | Performed by: INTERNAL MEDICINE

## 2020-07-22 PROCEDURE — 1036F TOBACCO NON-USER: CPT | Performed by: INTERNAL MEDICINE

## 2020-07-22 PROCEDURE — 1160F RVW MEDS BY RX/DR IN RCRD: CPT | Performed by: INTERNAL MEDICINE

## 2020-07-22 PROCEDURE — 99214 OFFICE O/P EST MOD 30 MIN: CPT | Performed by: INTERNAL MEDICINE

## 2020-07-22 PROCEDURE — 3078F DIAST BP <80 MM HG: CPT | Performed by: INTERNAL MEDICINE

## 2020-07-22 PROCEDURE — 4040F PNEUMOC VAC/ADMIN/RCVD: CPT | Performed by: INTERNAL MEDICINE

## 2020-07-22 PROCEDURE — 3008F BODY MASS INDEX DOCD: CPT | Performed by: INTERNAL MEDICINE

## 2020-07-22 NOTE — PROGRESS NOTES
Cardiology Follow Up    Maximilian Kc  1945  [id number]  [address] [facility]  [address] [place] [address] [address]    1  Coronary artery disease involving native coronary artery of native heart without angina pectoris     2  Benign essential HTN     3  Hypercholesterolemia     4  Carotid artery stenosis, asymptomatic, bilateral  VAS carotid complete study       Discussion/Summary:    Overall he has been doing well from a cardiac standpoint  Coronary disease stable no complaints of angina  Functional capacity has been good  He had spine surgery last December and has been much more active  Blood pressures been well controlled on home checks  Lipids are stable  Continue current dose of statin  He is due for a carotid Doppler this year to follow-up on mild stenosis from 2018  Follow-up in 8 months  Interval History:   74year-old gentle with a history of coronary artery disease status post CABG 10 years ago presents for a routine scheduled follow-up visit  He also has a history of hypertension, hyperlipidemia, mild carotid plaque  He has been rather sedentary since her last visit  He suffers from chronic back pain he presents today for preoperative risk assessment for spine surgery  Coronary disease stable with no complaints of angina  Functional capacity is quite good  Since her last visit he has been doing quite well  He underwent back surgery and this improved his functional capacity  He denies any chest pain, shortness of breath, palpitations, lightheadedness, dizziness, or syncope  There has been no lower extremity edema, PND, orthopnea    Problem List     Myalgia    Polyarthralgia    Fatigue    Low vitamin D level    Lumbar pain    Adolescent idiopathic scoliosis of thoracolumbar region    Abnormal glucose    Benign essential HTN    BPH (benign prostatic hyperplasia)    Depression Gastroesophageal reflux disease without esophagitis    Hypercholesterolemia    Scoliosis    Screening for malignant neoplasm of prostate    Coronary artery disease involving native coronary artery of native heart without angina pectoris        Past Medical History:   Diagnosis Date    Actinic keratosis     RESOLVED: [id number]    Adolescent idiopathic scoliosis of thoracolumbar region 4/12/2018    Basal cell carcinoma of back     RESOLVED: [id number]    Basal cell carcinoma of skin of upper extremity, including shoulder     BCC (basal cell carcinoma), trunk     BCE (basal cell epithelioma), trunk     RESOLVED: [id number]    Benign neoplasm of skin of face     Coronary artery disease involving native coronary artery of native heart without angina pectoris 10/16/2018    Depression 10/6/2015    Gastroesophageal reflux disease without esophagitis 4/21/2017    Hypertension     Neoplasm of uncertain behavior of skin     Nonmelanoma skin cancer     LAST ASSESSED: [id number]    Seborrheic keratosis     RESOLVED: [id number]     Social History     Socioeconomic History    Marital status: /Civil Union     Spouse name: Not on file    Number of children: Not on file    Years of education: Not on file    Highest education level: Not on file   Occupational History    Not on file   Social Needs    Financial resource strain: Not on file    Food insecurity:     Worry: Not on file     Inability: Not on file    Transportation needs:     Medical: Not on file     Non-medical: Not on file   Tobacco Use    Smoking status: Never Smoker    Smokeless tobacco: Never Used   Substance and Sexual Activity    Alcohol use: Yes     Comment: occasional    Drug use: No    Sexual activity: Not Currently   Lifestyle    Physical activity:     Days per week: Not on file     Minutes per session: Not on file    Stress: Not on file   Relationships    Social connections:     Talks on phone: Not on file     Gets together: Not on file     Attends [employer]ism service: Not on file     Active member of club or organization: Not on file     Attends meetings of clubs or organizations: Not on file     Relationship status: Not on file    Intimate partner violence:     Fear of current or ex partner: Not on file     Emotionally abused: Not on file     Physically abused: Not on file     Forced sexual activity: Not on file   Other Topics Concern    Not on file   Social History Narrative    Not on file      Family History   Problem Relation Age of Onset    Other Mother         BRAIN TUMOR    No Known Problems Father     Vascular Disease Brother     Cancer Brother      Past Surgical History:   Procedure Laterality Date    ANAL FISTULOTOMY  06/13/2011    DR SANCHEZ    CORONARY ARTERY BYPASS GRAFT  05/17/2005    TRIPLE; DR Weiner [facility]ERY  10/2009       [address] [place] [address] SURGERY  06/18/2015    JOINT REPLACEMENT; DR Breonna Sy       Current Outpatient Medications:     aspirin 81 MG tablet, Take 1 tablet by mouth daily , Disp: , Rfl:     carvedilol (COREG) 25 mg tablet, Take 1 tablet (25 mg total) by mouth 2 (two) times a day with meals, Disp: 180 tablet, Rfl: 1    Multiple Vitamins-Minerals (MULTIVITAMIN ADULT PO), Take 1 tablet by mouth daily, Disp: , Rfl:     rosuvastatin (CRESTOR) 10 MG tablet, Take 1 tablet (10 mg total) by mouth daily at bedtime, Disp: 90 tablet, Rfl: 1    colchicine (COLCRYS) 0 6 mg tablet, Take 1 tablet (0 6 mg total) by mouth 2 (two) times a day (Patient not taking: Reported on 11/14/2019), Disp: 60 tablet, Rfl: 0    gabapentin (NEURONTIN) 100 mg capsule, Take 1 capsule (100 mg total) by mouth 2 (two) times a day (Patient not taking: Reported on 7/22/2020), Disp: 180 capsule, Rfl: 1    indomethacin (INDOCIN) 50 mg capsule, Take 1 capsule (50 mg total) by mouth 2 (two) times a day with meals (Patient not taking: Reported on 3/9/2020), Disp: 60 capsule, Rfl: 0  Allergies   Allergen Reactions    Other      [place]       Labs:     Chemistry        Component Value Date/Time     07/21/2015 0954    K 4 7 02/18/2020 1048     02/18/2020 1048    CO2 23 02/18/2020 1048    BUN 16 02/18/2020 1048    CREATININE 1 29 (H) 02/18/2020 1048    CREATININE 1 38 (H) 05/20/2016 1035    CREATININE 1 43 (H) 07/21/2015 0954        Component Value Date/Time    CALCIUM 8 4 05/20/2016 1035    CALCIUM 8 8 07/21/2015 0954    ALKPHOS 58 05/20/2016 1035    ALKPHOS 77 07/21/2015 0954    AST 21 02/18/2020 1048    ALT 21 02/18/2020 1048    BILITOT 0 53 07/21/2015 0954            Lab Results   Component Value Date    CHOL 142 12/16/2015    CHOL 231 07/21/2015     Lab Results   Component Value Date    HDL 54 08/22/2019    HDL 49 04/23/2019    HDL 47 10/15/2018     Lab Results   Component Value Date    LDLCALC 41 08/22/2019    LDLCALC 73 04/23/2019    LDLCALC 94 10/15/2018     Lab Results   Component Value Date    TRIG 79 08/22/2019    TRIG 146 04/23/2019    TRIG 181 (H) 10/15/2018     No results found for: CHOLHDL    Imaging: No results found  ECG:  Sinus bradycardia inferior infarct age indeterminate      ROS    Vitals:    07/22/20 1316   BP: 146/74   Pulse: 59   Temp: 99 °F (37 2 °C)   SpO2: 97%     Vitals:    07/22/20 1316   Weight: 90 kg (198 lb 6 4 oz)     Height: 5' 3" (160 cm)   Body mass index is 35 14 kg/m²  Physical Exam:  Vital signs reviewed  General:  Alert and cooperative, appears stated age, no acute distress  HEENT:  PERRLA, EOMI, no scleral icterus, no conjunctival pallor  Neck:  No lymphadenopathy, no thyromegaly, no carotid bruits, no elevated JVP  Heart:  Regular rate and rhythm, normal S1/S2, no S3/S4, no murmur, rubs or gallops  PMI nondisplaced  Lungs:  Clear to auscultation bilaterally, no wheezes rales or rhonchi  Abdomen:  Soft, non-tender, positive bowel sounds, no rebound or guarding,   no organomegaly   Extremities:  Normal range of motion    No clubbing, cyanosis or edema   Vascular:  2+ pedal pulses  Skin:  No rashes or lesions on exposed skin  Neurologic:  Cranial nerves II-XII grossly intact without focal deficits  Psych:  Normal mood and affect

## 2020-08-19 ENCOUNTER — HOSPITAL ENCOUNTER (OUTPATIENT)
Dept: NON INVASIVE DIAGNOSTICS | Facility: CLINIC | Age: 75
Discharge: HOME | End: 2020-08-19
Payer: COMMERCIAL

## 2020-08-19 DIAGNOSIS — I65.23 CAROTID ARTERY STENOSIS, ASYMPTOMATIC, BILATERAL: ICD-10-CM

## 2020-08-19 PROCEDURE — 93880 EXTRACRANIAL BILAT STUDY: CPT | Performed by: SURGERY

## 2020-08-19 PROCEDURE — 93880 EXTRACRANIAL BILAT STUDY: CPT

## 2020-08-27 PROBLEM — K62.5 RECTAL BLEEDING: Status: ACTIVE | Noted: 2020-08-27

## 2020-08-27 LAB
25(OH)D3+25(OH)D2 SERPL-MCNC: 30.8 NG/ML (ref 30–100)
ALBUMIN SERPL-MCNC: 4.3 G/DL (ref 3.7–4.7)
ALBUMIN/GLOB SERPL: 2.3 {RATIO} (ref 1.2–2.2)
ALP SERPL-CCNC: 47 IU/L (ref 39–117)
ALT SERPL-CCNC: 22 IU/L (ref 0–44)
AST SERPL-CCNC: 21 IU/L (ref 0–40)
BILIRUB SERPL-MCNC: 0.4 MG/DL (ref 0–1.2)
BUN SERPL-MCNC: 17 MG/DL (ref 8–27)
BUN/CREAT SERPL: 12 (ref 10–24)
CALCIUM SERPL-MCNC: 9.5 MG/DL (ref 8.6–10.2)
CHLORIDE SERPL-SCNC: 105 MMOL/L (ref 96–106)
CHOLEST SERPL-MCNC: 113 MG/DL (ref 100–199)
CK SERPL-CCNC: 50 U/L (ref 41–331)
CO2 SERPL-SCNC: 22 MMOL/L (ref 20–29)
CREAT SERPL-MCNC: 1.38 MG/DL (ref 0.76–1.27)
GLOBULIN SER-MCNC: 1.9 G/DL (ref 1.5–4.5)
GLUCOSE SERPL-MCNC: 113 MG/DL (ref 65–99)
HDLC SERPL-MCNC: 48 MG/DL
LDLC SERPL CALC-MCNC: 42 MG/DL (ref 0–99)
POTASSIUM SERPL-SCNC: 4.9 MMOL/L (ref 3.5–5.2)
PROT SERPL-MCNC: 6.2 G/DL (ref 6–8.5)
SL AMB EGFR AFRICAN AMERICAN: 57 ML/MIN/1.73
SL AMB EGFR NON AFRICAN AMERICAN: 49 ML/MIN/1.73
SL AMB VLDL CHOLESTEROL CALC: 23 MG/DL (ref 5–40)
SODIUM SERPL-SCNC: 140 MMOL/L (ref 134–144)
TRIGL SERPL-MCNC: 116 MG/DL (ref 0–149)

## 2020-09-05 DIAGNOSIS — M10.09 ACUTE IDIOPATHIC GOUT OF MULTIPLE SITES: ICD-10-CM

## 2020-09-05 DIAGNOSIS — R73.09 ABNORMAL GLUCOSE: ICD-10-CM

## 2020-09-05 DIAGNOSIS — M54.50 LUMBAR PAIN: ICD-10-CM

## 2020-09-08 RX ORDER — GABAPENTIN 100 MG/1
CAPSULE ORAL
Qty: 180 CAPSULE | Refills: 1 | OUTPATIENT
Start: 2020-09-08

## 2020-09-10 ENCOUNTER — OFFICE VISIT (OUTPATIENT)
Dept: INTERNAL MEDICINE CLINIC | Facility: CLINIC | Age: 75
End: 2020-09-10
Payer: COMMERCIAL

## 2020-09-10 VITALS
HEART RATE: 50 BPM | HEIGHT: 62 IN | SYSTOLIC BLOOD PRESSURE: 120 MMHG | BODY MASS INDEX: 36.36 KG/M2 | TEMPERATURE: 98.3 F | OXYGEN SATURATION: 98 % | DIASTOLIC BLOOD PRESSURE: 72 MMHG | WEIGHT: 197.6 LBS

## 2020-09-10 DIAGNOSIS — N18.30 CKD (CHRONIC KIDNEY DISEASE) STAGE 3, GFR 30-59 ML/MIN (HCC): ICD-10-CM

## 2020-09-10 DIAGNOSIS — K59.01 SLOW TRANSIT CONSTIPATION: ICD-10-CM

## 2020-09-10 DIAGNOSIS — R79.89 LOW VITAMIN D LEVEL: ICD-10-CM

## 2020-09-10 DIAGNOSIS — M41.125 ADOLESCENT IDIOPATHIC SCOLIOSIS OF THORACOLUMBAR REGION: ICD-10-CM

## 2020-09-10 DIAGNOSIS — I10 BENIGN ESSENTIAL HTN: ICD-10-CM

## 2020-09-10 DIAGNOSIS — R73.09 ABNORMAL GLUCOSE: Primary | ICD-10-CM

## 2020-09-10 DIAGNOSIS — E78.00 HYPERCHOLESTEROLEMIA: ICD-10-CM

## 2020-09-10 PROCEDURE — 1036F TOBACCO NON-USER: CPT | Performed by: FAMILY MEDICINE

## 2020-09-10 PROCEDURE — 99214 OFFICE O/P EST MOD 30 MIN: CPT | Performed by: FAMILY MEDICINE

## 2020-09-10 PROCEDURE — 1160F RVW MEDS BY RX/DR IN RCRD: CPT | Performed by: FAMILY MEDICINE

## 2020-09-10 PROCEDURE — 3725F SCREEN DEPRESSION PERFORMED: CPT | Performed by: FAMILY MEDICINE

## 2020-09-10 PROCEDURE — 3078F DIAST BP <80 MM HG: CPT | Performed by: FAMILY MEDICINE

## 2020-09-10 RX ORDER — ERGOCALCIFEROL 1.25 MG/1
50000 CAPSULE ORAL WEEKLY
Qty: 12 CAPSULE | Refills: 0 | Status: SHIPPED | OUTPATIENT
Start: 2020-09-10

## 2020-09-10 NOTE — PROGRESS NOTES
Assessment/Plan:    1  Abnormal glucose  -     Comprehensive metabolic panel; Future  -     Hemoglobin A1C; Future    2  Adolescent idiopathic scoliosis of thoracolumbar region    3  Benign essential HTN    4  Low vitamin D level  -     ergocalciferol (VITAMIN D2) 50,000 units; Take 1 capsule (50,000 Units total) by mouth once a week    5  Hypercholesterolemia  -     Lipid panel; Future  -     Comprehensive metabolic panel; Future    6  CKD (chronic kidney disease) stage 3, GFR 30-59 ml/min ([facility])    7  BMI 35 0-35 9,adult    8  Slow transit constipation      BMI Counseling: Body mass index is 35 9 kg/m²  The BMI is above normal  Nutrition recommendations include moderation in carbohydrate intake  Exercise recommendations include exercising 3-5 times per week  No pharmacotherapy was ordered  Return in about 6 months (around 3/10/2021) for Recheck  Subjective:      Patient ID: Smitha Keen is a 66 y o  male  Chief Complaint   Patient presents with    Follow-up     10 M f/u visit for hypertension, review labs       HPI    Hyperlipidemia (Follow-Up): The patient states his hyperlipidemia has been stable since the last visit  Comorbid Illnesses: hypertension  He has no significant interval events  Symptoms: The patient is currently asymptomatic  Associated symptoms include no focal neurologic deficits   Medications: The patient is not currently on any medications for his hyperlipidemia  The patient is not doing well with his hyperlipidemia goals  the patient's LDL goal is 80 mg/dL  The patient is due for a lipid panel  September 2020, tolerating statin and levels are  well controlled     Hypertension (Follow-Up): The patient presents for follow-up of essential hypertension  The patient states he has been doing well with his blood pressure control since the last visit  He has no comorbid illnesses  He has no significant interval events  Symptoms: The patient is currently asymptomatic   Associated symptoms include no headache   Medications: The patient is not currently on any medications for his hypertension--lifestyle modification only   Disease Management: the patient is not doing well with his blood pressure goals  September 2020, taking medications as prescribed with no side effects and no dizziness     Coronary Artery Disease (Follow-Up): The patient states he has been doing well with his coronary artery disease symptoms since the last visit  Comorbid Illnesses: hypertension  Complications: MI    Symptoms: The patient is currently asymptomatic  His symptoms do not limit his activities  He is able to walk about 2 to 3 flights of stairs without symptoms  He denies nitroglycerin use since the last visit   Medications: The patient is not currently on any medications for his coronary artery disease   Was told increase water intake     Abnormal glucose, hemoglobin A1c 6 3   He states his son has diabetes but no family history in his mom and dad   Fasting blood sugar that day was 173   He is is not check his sugars at home and has not had problems in the past   On review laboratory data 2016 was normal for his blood sugar  March 2020, great improvement in lab work and hemoglobin A1c 5 9 now   Kenzie Metz 2020 still glucose intolerant      Back pain, resolved  History of scoliosis as well as herniated discs and had surgery by Dr Deborah Segura 3 months ago on December 2nd  He states he had the area fuse and now has no pain and does not take any pain medication  He is sleeping much better after surgery  He goes in the next few weeks to get clearance to go back to the gym      Constipation, has been starting to drink prune juice  Has several days when he does not have bowel movement and has history of hemorrhoids  Follows with Colorectal surgery as well as GI we does not colonoscopies  Will start to take MiraLax now  fully admits to not drinking enough water    Chronic kidney disease now stage III    Slightly worsening  Patient fully admits to not drinking enough water throughout the day and has been doing that for years  No other insults to his kidney      The following portions of the patient's history were reviewed and updated as appropriate: allergies, current medications, past family history, past medical history, past social history, past surgical history and problem list     Review of Systems      Constitutional:  Denies fever or chills no significant weight changes  Eyes:  Denies double or blurry vision  HENT:  Denies nasal congestion or sore throat occasional allergy symptoms   Respiratory:  Denies cough or shortness of breath or wheezing  Cardiovascular:  Denies palpitations or chest pain  GI:  Denies abdominal pain, nausea, or vomiting no heartburn but has constipation  Integument:  Denies rash , no open areas  Neurologic:  Denies headache or focal weakness no dizziness        Current Outpatient Medications   Medication Sig Dispense Refill    aspirin 81 MG tablet Take 1 tablet by mouth daily       carvedilol (COREG) 25 mg tablet Take 1 tablet (25 mg total) by mouth 2 (two) times a day with meals 180 tablet 1    Multiple Vitamins-Minerals (MULTIVITAMIN ADULT PO) Take 1 tablet by mouth daily      rosuvastatin (CRESTOR) 10 MG tablet Take 1 tablet (10 mg total) by mouth daily at bedtime 90 tablet 1    colchicine (COLCRYS) 0 6 mg tablet Take 1 tablet (0 6 mg total) by mouth 2 (two) times a day (Patient not taking: Reported on 11/14/2019) 60 tablet 0    ergocalciferol (VITAMIN D2) 50,000 units Take 1 capsule (50,000 Units total) by mouth once a week 12 capsule 0    indomethacin (INDOCIN) 50 mg capsule Take 1 capsule (50 mg total) by mouth 2 (two) times a day with meals (Patient not taking: Reported on 3/9/2020) 60 capsule 0     No current facility-administered medications for this visit          Objective:    /72 (BP Location: Left arm, Patient Position: Sitting, Cuff Size: Standard)   Pulse (!) 50 Temp 98 3 °F (36 8 °C) (Temporal)   Ht 5' 2 21" (1 58 m)   Wt 89 6 kg (197 lb 9 6 oz)   SpO2 98%   BMI 35 90 kg/m²        Physical Exam       Constitutional:  Well developed, well nourished, no acute distress, non-toxic appearance   Eyes:  PERRL, conjunctiva normal , non icteric sclera  HENT:  Atraumatic, oropharynx moist  Neck-  supple   Respiratory:  CTA b/l, normal breath sounds, no rales, no wheezing   Cardiovascular:  RRR, no murmurs, no LE edema b/l  GI:  Soft, nondistended, normal bowel sounds x 4, nontender, no organomegaly, no mass, no rebound, no guarding   Neurologic:  no focal deficits noted   Psychiatric:  Speech and behavior appropriate , AAO x 3        Ltanya Hem, DO

## 2020-09-10 NOTE — PATIENT INSTRUCTIONS
Fall Prevention   AMBULATORY CARE:   Fall prevention  includes ways to make your home and other areas safer  It also includes ways you can move more carefully to prevent a fall  Health conditions that cause changes in your blood pressure, vision, or muscle strength and coordination may increase your risk for falls  Medicines may also increase your risk for falls if they make you dizzy, weak, or sleepy  Call 911 or have someone else call if:   · You have fallen and are unconscious  · You have fallen and cannot move part of your body  Contact your healthcare provider if:   · You have fallen and have pain or a headache  · You have questions or concerns about your condition or care  Fall prevention tips:   · Stand or sit up slowly  This may help you keep your balance and prevent falls  · Use assistive devices as directed  Your healthcare provider may suggest that you use a cane or walker to help you keep your balance  You may need to have grab bars put in your bathroom near the toilet or in the shower  · Wear shoes that fit well and have soles that   Wear shoes both inside and outside  Use slippers with good   Do not wear shoes with high heels  · Wear a personal alarm  This is a device that allows you to call 911 if you fall and need help  Ask your healthcare provider for more information  · Stay active  Exercise can help strengthen your muscles and improve your balance  Your healthcare provider may recommend water aerobics or walking  He or she may also recommend physical therapy to improve your coordination  Never start an exercise program without talking to your healthcare provider first      · Manage your medical conditions  Keep all appointments with your healthcare providers  Visit your eye doctor as directed  Home safety tips:   · Add items to prevent falls in the bathroom  Put nonslip strips on your bath or shower floor to prevent you from slipping   Use a bath mat if you do not have carpet in the bathroom  This will prevent you from falling when you step out of the bath or shower  Use a shower seat so you do not need to stand while you shower  Sit on the toilet or a chair in your bathroom to dry yourself and put on clothing  This will prevent you from losing your balance from drying or dressing yourself while you are standing  · Keep paths clear  Remove books, shoes, and other objects from walkways and stairs  Place cords for telephones and lamps out of the way so that you do not need to walk over them  Tape them down if you cannot move them  Remove small rugs  If you cannot remove a rug, secure it with double-sided tape  This will prevent you from tripping  · Install bright lights in your home  Use night lights to help light paths to the bathroom or kitchen  Always turn on the light before you start walking  · Keep items you use often on shelves within reach  Do not use a step stool to help you reach an item  · Paint or place reflective tape on the edges of your stairs  This will help you see the stairs better  Follow up with your healthcare provider as directed:  Write down your questions so you remember to ask them during your visits  © 2017 [employer] Information is for End User's use only and may not be sold, redistributed or otherwise used for commercial purposes  All illustrations and images included in CareNotes® are the copyrighted property of [employer] , [employer]  or Thaddeus Romero  The above information is an  only  It is not intended as medical advice for individual conditions or treatments  Talk to your doctor, nurse or pharmacist before following any medical regimen to see if it is safe and effective for you

## 2020-11-30 DIAGNOSIS — R79.89 LOW VITAMIN D LEVEL: ICD-10-CM

## 2020-11-30 RX ORDER — ERGOCALCIFEROL 1.25 MG/1
CAPSULE ORAL
Qty: 12 CAPSULE | Refills: 0 | OUTPATIENT
Start: 2020-11-30

## 2020-12-18 DIAGNOSIS — E78.00 HYPERCHOLESTEROLEMIA: ICD-10-CM

## 2020-12-19 RX ORDER — ROSUVASTATIN CALCIUM 10 MG/1
TABLET, COATED ORAL
Qty: 90 TABLET | Refills: 1 | OUTPATIENT
Start: 2020-12-19

## 2020-12-21 DIAGNOSIS — E78.00 HYPERCHOLESTEROLEMIA: ICD-10-CM

## 2020-12-21 RX ORDER — ROSUVASTATIN CALCIUM 10 MG/1
10 TABLET, COATED ORAL
Qty: 90 TABLET | Refills: 1 | Status: SHIPPED | OUTPATIENT
Start: 2020-12-21 | End: 2021-07-02 | Stop reason: SDUPTHER

## 2021-01-21 ENCOUNTER — TRANSITIONAL CARE MANAGEMENT (OUTPATIENT)
Dept: INTERNAL MEDICINE CLINIC | Age: 76
End: 2021-01-21

## 2021-01-21 RX ORDER — ACETAMINOPHEN 500 MG
1000 TABLET ORAL EVERY 8 HOURS PRN
COMMUNITY
Start: 2021-01-20 | End: 2021-01-30

## 2021-01-21 RX ORDER — CLOPIDOGREL BISULFATE 75 MG/1
75 TABLET ORAL DAILY
COMMUNITY
Start: 2021-01-20 | End: 2021-03-26 | Stop reason: SDUPTHER

## 2021-01-21 RX ORDER — FAMOTIDINE 20 MG/1
20 TABLET, FILM COATED ORAL 2 TIMES DAILY
COMMUNITY
Start: 2021-01-20 | End: 2021-03-08

## 2021-01-22 DIAGNOSIS — Z23 ENCOUNTER FOR IMMUNIZATION: ICD-10-CM

## 2021-01-25 ENCOUNTER — OFFICE VISIT (OUTPATIENT)
Dept: INTERNAL MEDICINE CLINIC | Facility: CLINIC | Age: 76
End: 2021-01-25
Payer: COMMERCIAL

## 2021-01-25 VITALS
DIASTOLIC BLOOD PRESSURE: 70 MMHG | SYSTOLIC BLOOD PRESSURE: 130 MMHG | WEIGHT: 196.6 LBS | HEIGHT: 63 IN | BODY MASS INDEX: 34.84 KG/M2 | TEMPERATURE: 97.7 F | HEART RATE: 55 BPM | OXYGEN SATURATION: 98 %

## 2021-01-25 DIAGNOSIS — M10.09 ACUTE IDIOPATHIC GOUT OF MULTIPLE SITES: ICD-10-CM

## 2021-01-25 DIAGNOSIS — I10 BENIGN ESSENTIAL HTN: Primary | ICD-10-CM

## 2021-01-25 DIAGNOSIS — I25.10 CORONARY ARTERY DISEASE INVOLVING NATIVE CORONARY ARTERY OF NATIVE HEART WITHOUT ANGINA PECTORIS: ICD-10-CM

## 2021-01-25 DIAGNOSIS — I63.9 CEREBELLAR INFARCTION (HCC): ICD-10-CM

## 2021-01-25 PROCEDURE — 99496 TRANSJ CARE MGMT HIGH F2F 7D: CPT | Performed by: INTERNAL MEDICINE

## 2021-01-25 RX ORDER — COLCHICINE 0.6 MG/1
0.6 TABLET ORAL 2 TIMES DAILY PRN
Qty: 30 TABLET | Refills: 1 | Status: SHIPPED | OUTPATIENT
Start: 2021-01-25 | End: 2021-07-27

## 2021-01-25 RX ORDER — INDOMETHACIN 50 MG/1
50 CAPSULE ORAL 2 TIMES DAILY PRN
Qty: 30 CAPSULE | Refills: 1 | Status: SHIPPED | OUTPATIENT
Start: 2021-01-25 | End: 2021-03-09 | Stop reason: HOSPADM

## 2021-01-25 NOTE — PROGRESS NOTES
Assessment/Plan:    1  Cerebellar infarction  Patient was admitted to [facility]  Underwent MRI/MRA of neck and brain  Was started on Plavix along with aspirin  Found to have multiple blockages of small blood vessels  2  Essential hypertension  Blood pressure is stable on present regimen  Advised to continue to monitor blood pressure at home and will bring his blood pressure machine to our office on next visit to compare with our machines  3  Hyperlipidemia  Continue with Crestor 10 mg daily    4  History of gout  Continue with present regimen  BMI Counseling: Body mass index is 35 05 kg/m²  The BMI is above normal  Nutrition recommendations include decreasing portion sizes, encouraging healthy choices of fruits and vegetables, decreasing fast food intake, consuming healthier snacks and limiting drinks that contain sugar  Exercise recommendations include vigorous physical activity 75 minutes/week  No pharmacotherapy was ordered  Diagnoses and all orders for this visit:    Benign essential HTN    Acute idiopathic gout of multiple sites  -     indomethacin (INDOCIN) 50 mg capsule; Take 1 capsule (50 mg total) by mouth 2 (two) times a day as needed (gout)  -     colchicine (COLCRYS) 0 6 mg tablet; Take 1 tablet (0 6 mg total) by mouth 2 (two) times a day as needed (gout)    Coronary artery disease involving native coronary artery of native heart without angina pectoris    Cerebellar infarction [facility])          Subjective:          Patient ID: Lydia Rodgers is a 66 y o  male      TCM Call (since 12/25/2020)     Date and time call was made  1/21/2021 12:35 PM    Hospital care reviewed  Records reviewed    Patient was hospitialized at  [facility]        Date of Admission  01/18/21    Date of discharge  01/20/21    Diagnosis  Dizziness, Nausea, Cerebellar Stroke, Essential hypertension    Disposition  Home    Were the patients medications reviewed and updated  Yes Current Symptoms  None      TCM Call (since 12/25/2020)     Post hospital issues  Reduced activity    Should patient be enrolled in anticoag monitoring? Yes    Scheduled for follow up? Yes    Did you obtain your prescribed medications  Yes    Do you need help managing your prescriptions or medications  No    Is transportation to your appointment needed  No    I have advised the patient to call PCP with any new or worsening symptoms  [id number] or [employer] other    Support System  Spouse    The type of support provided  Emotional; Physical    Do you have social support  Yes, quite a bit    Are you recieving any outpatient services  No    Are you recieving home care services  No    Are you using any community resources  No    Current waiver services  No    Have you fallen in the last 12 months  No    Interperter language line needed  No    Counseling  Patient    Counseling topics  Activities of daily living; Importance of RX compliance; instructions for management          This is a follow-up visit after hospitalization  He was admitted to [facility] with occipital headache and found to have a cerebellar infarct  Was seen by Neurology underwent MRI MRA of head and neck  Plavix was added  Also have unsteady gait which is back to normal now      The following portions of the patient's history were reviewed and updated as appropriate: allergies, current medications, past family history, past medical history, past social history, past surgical history and problem list     Review of Systems   Constitutional: Negative for fatigue and fever  HENT: Negative for congestion, ear discharge, ear pain, postnasal drip, sinus pressure, sore throat, tinnitus and trouble swallowing  Eyes: Negative for discharge, itching and visual disturbance  Respiratory: Negative for cough and shortness of breath      Cardiovascular: Negative for chest pain and palpitations  Gastrointestinal: Negative for abdominal pain, diarrhea, nausea and vomiting  Endocrine: Negative for cold intolerance and polyuria  Genitourinary: Negative for difficulty urinating, dysuria and urgency  Musculoskeletal: Positive for arthralgias  Negative for neck pain  Skin: Negative for rash  Allergic/Immunologic: Negative for environmental allergies  Neurological: Negative for dizziness, weakness and headaches  Psychiatric/Behavioral: Negative for agitation and behavioral problems  The patient is not nervous/anxious            Past Medical History:   Diagnosis Date    Actinic keratosis     RESOLVED: [id number]    Adolescent idiopathic scoliosis of thoracolumbar region 4/12/2018    Basal cell carcinoma of back     RESOLVED: [id number]    Basal cell carcinoma of skin of upper extremity, including shoulder     BCC (basal cell carcinoma), trunk     BCE (basal cell epithelioma), trunk     RESOLVED: [id number]    Benign neoplasm of skin of face     Coronary artery disease involving native coronary artery of native heart without angina pectoris 10/16/2018    Depression 10/6/2015    Gastroesophageal reflux disease without esophagitis 4/21/2017    Hypertension     Neoplasm of uncertain behavior of skin     Nonmelanoma skin cancer     LAST ASSESSED: [id number]    Seborrheic keratosis     RESOLVED: [id number]    Stroke (HCC)          Current Outpatient Medications:     acetaminophen (TYLENOL) 500 mg tablet, Take 1,000 mg by mouth every 8 (eight) hours as needed, Disp: , Rfl:     aspirin 81 MG tablet, Take 1 tablet by mouth daily , Disp: , Rfl:     carvedilol (COREG) 25 mg tablet, Take 1 tablet (25 mg total) by mouth 2 (two) times a day with meals, Disp: 180 tablet, Rfl: 1    clopidogrel (PLAVIX) 75 mg tablet, Take 75 mg by mouth daily, Disp: , Rfl:     colchicine (COLCRYS) 0 6 mg tablet, Take 1 tablet (0 6 mg total) by mouth 2 (two) times a day as needed (gout), Disp: 30 tablet, Rfl: 1    ergocalciferol (VITAMIN D2) 50,000 units, Take 1 capsule (50,000 Units total) by mouth once a week, Disp: 12 capsule, Rfl: 0    famotidine (PEPCID) 20 mg tablet, Take 20 mg by mouth 2 (two) times a day, Disp: , Rfl:     indomethacin (INDOCIN) 50 mg capsule, Take 1 capsule (50 mg total) by mouth 2 (two) times a day as needed (gout), Disp: 30 capsule, Rfl: 1    Multiple Vitamins-Minerals (MULTIVITAMIN ADULT PO), Take 1 tablet by mouth daily, Disp: , Rfl:     rosuvastatin (CRESTOR) 10 MG tablet, Take 1 tablet (10 mg total) by mouth daily at bedtime, Disp: 90 tablet, Rfl: 1    Allergies   Allergen Reactions    Other      [place] trees       Social History   Past Surgical History:   Procedure Laterality Date    ANAL FISTULOTOMY  06/13/2011    DR SANCHEZ    CORONARY ARTERY BYPASS GRAFT  05/17/2005    TRIPLE;   [address] [facility] SURGERY  10/2009      [address] [place] [address] SURGERY  06/18/2015    JOINT REPLACEMENT; DR Meghana Lee     Family History   Problem Relation Age of Onset    Other Mother         BRAIN TUMOR    No Known Problems Father     Vascular Disease Brother     Cancer Brother        Objective:  /70 (BP Location: Left arm, Patient Position: Sitting, Cuff Size: Large)   Pulse 55   Temp 97 7 °F (36 5 °C) (Probe)   Ht 5' 2 8" (1 595 m)   Wt 89 2 kg (196 lb 9 6 oz)   SpO2 98% Comment: Room Air  BMI 35 05 kg/m²   Body mass index is 35 05 kg/m²  Physical Exam  Constitutional:       Appearance: He is well-developed  He is not toxic-appearing or diaphoretic  HENT:      Head: Normocephalic  Right Ear: Ear canal and external ear normal       Left Ear: Ear canal and external ear normal       Mouth/Throat:      Pharynx: Oropharynx is clear  Eyes:      General: No scleral icterus  Pupils: Pupils are equal, round, and reactive to light  Neck:      Musculoskeletal: Normal range of motion and neck supple  Thyroid: No thyromegaly  Trachea: No tracheal deviation  Cardiovascular:      Rate and Rhythm: Normal rate and regular rhythm  Heart sounds: Normal heart sounds  No murmur  Pulmonary:      Effort: Pulmonary effort is normal  No respiratory distress  Breath sounds: Normal breath sounds  Chest:      Chest wall: No tenderness  Abdominal:      General: Bowel sounds are normal       Palpations: Abdomen is soft  There is no mass  Tenderness: There is no abdominal tenderness  Musculoskeletal: Normal range of motion  Right lower leg: No edema  Left lower leg: No edema  Lymphadenopathy:      Cervical: No cervical adenopathy  Skin:     General: Skin is warm  Findings: No lesion or rash  Neurological:      Mental Status: He is alert and oriented to person, place, and time  Cranial Nerves: No cranial nerve deficit  Psychiatric:         Mood and Affect: Mood normal          Behavior: Behavior normal          Thought Content:  Thought content normal          Judgment: Judgment normal

## 2021-02-05 ENCOUNTER — IMMUNIZATIONS (OUTPATIENT)
Dept: FAMILY MEDICINE CLINIC | Facility: HOSPITAL | Age: 76
End: 2021-02-05

## 2021-02-05 DIAGNOSIS — Z23 ENCOUNTER FOR IMMUNIZATION: Primary | ICD-10-CM

## 2021-02-05 PROCEDURE — 91300 SARS-COV-2 / COVID-19 MRNA VACCINE (PFIZER-BIONTECH) 30 MCG: CPT

## 2021-02-05 PROCEDURE — 0001A SARS-COV-2 / COVID-19 MRNA VACCINE (PFIZER-BIONTECH) 30 MCG: CPT

## 2021-02-24 ENCOUNTER — IMMUNIZATIONS (OUTPATIENT)
Dept: FAMILY MEDICINE CLINIC | Facility: HOSPITAL | Age: 76
End: 2021-02-24

## 2021-02-24 DIAGNOSIS — Z23 ENCOUNTER FOR IMMUNIZATION: Primary | ICD-10-CM

## 2021-02-24 PROCEDURE — 91300 SARS-COV-2 / COVID-19 MRNA VACCINE (PFIZER-BIONTECH) 30 MCG: CPT

## 2021-02-24 PROCEDURE — 0002A SARS-COV-2 / COVID-19 MRNA VACCINE (PFIZER-BIONTECH) 30 MCG: CPT

## 2021-03-09 ENCOUNTER — OFFICE VISIT (OUTPATIENT)
Dept: INTERNAL MEDICINE CLINIC | Facility: CLINIC | Age: 76
End: 2021-03-09
Payer: COMMERCIAL

## 2021-03-09 VITALS
HEIGHT: 63 IN | WEIGHT: 203.2 LBS | DIASTOLIC BLOOD PRESSURE: 88 MMHG | SYSTOLIC BLOOD PRESSURE: 144 MMHG | TEMPERATURE: 97.4 F | OXYGEN SATURATION: 97 % | HEART RATE: 52 BPM | BODY MASS INDEX: 36 KG/M2

## 2021-03-09 DIAGNOSIS — I25.10 CORONARY ARTERY DISEASE INVOLVING NATIVE CORONARY ARTERY OF NATIVE HEART WITHOUT ANGINA PECTORIS: ICD-10-CM

## 2021-03-09 DIAGNOSIS — I65.23 CAROTID ARTERY STENOSIS, ASYMPTOMATIC, BILATERAL: ICD-10-CM

## 2021-03-09 DIAGNOSIS — E66.01 OBESITY, MORBID (HCC): ICD-10-CM

## 2021-03-09 DIAGNOSIS — M1A.0720 CHRONIC GOUT OF LEFT FOOT, UNSPECIFIED CAUSE: ICD-10-CM

## 2021-03-09 DIAGNOSIS — Z00.00 MEDICARE ANNUAL WELLNESS VISIT, SUBSEQUENT: ICD-10-CM

## 2021-03-09 DIAGNOSIS — E11.22 TYPE 2 DIABETES MELLITUS WITH STAGE 3A CHRONIC KIDNEY DISEASE, WITHOUT LONG-TERM CURRENT USE OF INSULIN (HCC): ICD-10-CM

## 2021-03-09 DIAGNOSIS — I10 BENIGN ESSENTIAL HTN: Primary | ICD-10-CM

## 2021-03-09 DIAGNOSIS — N18.31 TYPE 2 DIABETES MELLITUS WITH STAGE 3A CHRONIC KIDNEY DISEASE, WITHOUT LONG-TERM CURRENT USE OF INSULIN (HCC): ICD-10-CM

## 2021-03-09 DIAGNOSIS — N18.31 STAGE 3A CHRONIC KIDNEY DISEASE (HCC): ICD-10-CM

## 2021-03-09 DIAGNOSIS — E78.00 HYPERCHOLESTEROLEMIA: ICD-10-CM

## 2021-03-09 PROCEDURE — 3079F DIAST BP 80-89 MM HG: CPT | Performed by: INTERNAL MEDICINE

## 2021-03-09 PROCEDURE — 3725F SCREEN DEPRESSION PERFORMED: CPT | Performed by: INTERNAL MEDICINE

## 2021-03-09 PROCEDURE — 99214 OFFICE O/P EST MOD 30 MIN: CPT | Performed by: INTERNAL MEDICINE

## 2021-03-09 PROCEDURE — 1125F AMNT PAIN NOTED PAIN PRSNT: CPT | Performed by: INTERNAL MEDICINE

## 2021-03-09 PROCEDURE — G0439 PPPS, SUBSEQ VISIT: HCPCS | Performed by: INTERNAL MEDICINE

## 2021-03-09 PROCEDURE — 3077F SYST BP >= 140 MM HG: CPT | Performed by: INTERNAL MEDICINE

## 2021-03-09 PROCEDURE — 1170F FXNL STATUS ASSESSED: CPT | Performed by: INTERNAL MEDICINE

## 2021-03-09 PROCEDURE — 1036F TOBACCO NON-USER: CPT | Performed by: INTERNAL MEDICINE

## 2021-03-09 PROCEDURE — 3288F FALL RISK ASSESSMENT DOCD: CPT | Performed by: INTERNAL MEDICINE

## 2021-03-09 PROCEDURE — 1160F RVW MEDS BY RX/DR IN RCRD: CPT | Performed by: INTERNAL MEDICINE

## 2021-03-09 RX ORDER — LOSARTAN POTASSIUM 50 MG/1
50 TABLET ORAL DAILY
Qty: 90 TABLET | Refills: 1 | Status: SHIPPED | OUTPATIENT
Start: 2021-03-09 | End: 2021-07-02

## 2021-03-09 NOTE — PROGRESS NOTES
Assessment/Plan:    1  Uncontrolled essential hypertension  Repeat blood pressure is 144/88  Will add valsartan 50 mg daily  Continue with carvedilol 25 mg p o  B i d   Also advised for better diet control exercise and weight loss  2  Type 2 diabetes mellitus  Last hemoglobin A1c 6 5 in January 2021  Continue with diabetic/low carb diet exercise and weight loss  Will check hemoglobin A1c before next visit    3  Gout  Last uric acid was elevated 8 4  Will repeat uric acid level before next visit  If still elevated will consider allopurinol  4  Hyperlipidemia  Continue with Crestor  Will check lipid profile before next visit         Diagnoses and all orders for this visit:    Benign essential HTN  -     Basic metabolic panel; Future  -     losartan (COZAAR) 50 mg tablet; Take 1 tablet (50 mg total) by mouth daily    Coronary artery disease involving native coronary artery of native heart without angina pectoris    Carotid artery stenosis, asymptomatic, bilateral    Stage 3a chronic kidney disease  -     Basic metabolic panel; Future  -     CBC; Future    BMI 35 0-35 9,adult    Hypercholesterolemia  -     Lipid Panel with Direct LDL reflex; Future  -     ALT; Future  -     AST; Future    Obesity, morbid ([address] )    Chronic gout of left foot, unspecified cause  -     Uric acid; Future    Type 2 diabetes mellitus with stage 3a chronic kidney disease, without long-term current use of insulin (HCC)  -     Microalbumin / creatinine urine ratio  -     Hemoglobin A1C; Future    Medicare annual wellness visit, subsequent               Subjective:          Patient ID: Nicolle Gibson is a 66 y o  male  HPI    The following portions of the patient's history were reviewed and updated as appropriate: allergies, current medications, past family history, past medical history, past social history, past surgical history and problem list     Review of Systems   Constitutional: Negative for fatigue and fever     HENT: Negative for congestion, ear discharge, ear pain, postnasal drip, sinus pressure, sore throat, tinnitus and trouble swallowing  Eyes: Negative for discharge, itching and visual disturbance  Respiratory: Negative for cough and shortness of breath  Cardiovascular: Negative for chest pain and palpitations  Gastrointestinal: Negative for abdominal pain, diarrhea, nausea and vomiting  Endocrine: Negative for cold intolerance and polyuria  Genitourinary: Negative for difficulty urinating, dysuria and urgency  Musculoskeletal: Negative for arthralgias and neck pain  Skin: Negative for rash  Allergic/Immunologic: Negative for environmental allergies  Neurological: Negative for dizziness, weakness and headaches  Psychiatric/Behavioral: Negative for agitation and behavioral problems  The patient is not nervous/anxious            Past Medical History:   Diagnosis Date    Actinic keratosis     RESOLVED: [id number]    Adolescent idiopathic scoliosis of thoracolumbar region 4/12/2018    Basal cell carcinoma of back     RESOLVED: [id number]    Basal cell carcinoma of skin of upper extremity, including shoulder     BCC (basal cell carcinoma), trunk     BCE (basal cell epithelioma), trunk     RESOLVED: [id number]    Benign neoplasm of skin of face     Coronary artery disease involving native coronary artery of native heart without angina pectoris 10/16/2018    Depression 10/6/2015    Gastroesophageal reflux disease without esophagitis 4/21/2017    Hypertension     Neoplasm of uncertain behavior of skin     Nonmelanoma skin cancer     LAST ASSESSED: [id number]    Seborrheic keratosis     RESOLVED: [id number]    Stroke ([facility] )          Current Outpatient Medications:     carvedilol (COREG) 25 mg tablet, Take 1 tablet (25 mg total) by mouth 2 (two) times a day with meals, Disp: 180 tablet, Rfl: 1    clopidogrel (PLAVIX) 75 mg tablet, Take 75 mg by mouth daily, Disp: , Rfl:     colchicine (COLCRYS) 0 6 mg tablet, Take 1 tablet (0 6 mg total) by mouth 2 (two) times a day as needed (gout), Disp: 30 tablet, Rfl: 1    ergocalciferol (VITAMIN D2) 50,000 units, Take 1 capsule (50,000 Units total) by mouth once a week, Disp: 12 capsule, Rfl: 0    Multiple Vitamins-Minerals (MULTIVITAMIN ADULT PO), Take 1 tablet by mouth daily, Disp: , Rfl:     rosuvastatin (CRESTOR) 10 MG tablet, Take 1 tablet (10 mg total) by mouth daily at bedtime, Disp: 90 tablet, Rfl: 1    losartan (COZAAR) 50 mg tablet, Take 1 tablet (50 mg total) by mouth daily, Disp: 90 tablet, Rfl: 1    Allergies   Allergen Reactions    Other      [place] trees       Social History   Past Surgical History:   Procedure Laterality Date    ANAL FISTULOTOMY  06/13/2011    DR SANCHEZ    CORONARY ARTERY BYPASS GRAFT  05/17/2005    TRIPLE;   [address] SURGERY  10/2009      [address] [place] [address] SURGERY  06/18/2015    JOINT REPLACEMENT; DR Aaorn Salvadorles     Family History   Problem Relation Age of Onset    Other Mother         BRAIN TUMOR    No Known Problems Father     Vascular Disease Brother     Cancer Brother        Objective:  /88 (BP Location: Left arm)   Pulse (!) 52   Temp (!) 97 4 °F (36 3 °C) (Tympanic)   Ht 5' 3" (1 6 m)   Wt 92 2 kg (203 lb 3 2 oz)   SpO2 97%   BMI 36 00 kg/m²   Body mass index is 36 kg/m²  Physical Exam  Constitutional:       Appearance: He is well-developed  HENT:      Head: Normocephalic  Right Ear: External ear normal       Left Ear: External ear normal    Eyes:      General: No scleral icterus  Pupils: Pupils are equal, round, and reactive to light  Neck:      Musculoskeletal: Normal range of motion and neck supple  Thyroid: No thyromegaly  Trachea: No tracheal deviation  Cardiovascular:      Rate and Rhythm: Normal rate and regular rhythm  Heart sounds: Normal heart sounds     Pulmonary:      Effort: Pulmonary effort is normal  No respiratory distress  Breath sounds: Normal breath sounds  Chest:      Chest wall: No tenderness  Abdominal:      General: Bowel sounds are normal       Palpations: Abdomen is soft  There is no mass  Tenderness: There is no abdominal tenderness  Musculoskeletal: Normal range of motion  Lymphadenopathy:      Cervical: No cervical adenopathy  Skin:     General: Skin is warm  Neurological:      Mental Status: He is alert and oriented to person, place, and time  Cranial Nerves: No cranial nerve deficit  Psychiatric:         Mood and Affect: Mood normal          Behavior: Behavior normal          Thought Content:  Thought content normal          Judgment: Judgment normal

## 2021-03-09 NOTE — PROGRESS NOTES
Assessment and Plan:     Problem List Items Addressed This Visit     None           Preventive health issues were discussed with patient, and age appropriate screening tests were ordered as noted in patient's After Visit Summary  Personalized health advice and appropriate referrals for health education or preventive services given if needed, as noted in patient's After Visit Summary       History of Present Illness:     Patient presents for Medicare Annual Wellness visit    Patient Care Team:  Siobhan Waller DO as PCP - MD Yolis Saab MD     Problem List:     Patient Active Problem List   Diagnosis    Low vitamin D level    Adolescent idiopathic scoliosis of thoracolumbar region    Abnormal glucose    Benign essential HTN    BPH (benign prostatic hyperplasia)    Hypercholesterolemia    Scoliosis    Coronary artery disease involving native coronary artery of native heart without angina pectoris    Gout of multiple sites    BMI 35 0-35 9,adult    Dysplastic nevus    Carotid artery stenosis, asymptomatic, bilateral    Rectal bleeding    CKD (chronic kidney disease) stage 3, GFR 30-59 ml/min    Slow transit constipation      Past Medical and Surgical History:     Past Medical History:   Diagnosis Date    Actinic keratosis     RESOLVED: [id number]    Adolescent idiopathic scoliosis of thoracolumbar region 4/12/2018    Basal cell carcinoma of back     RESOLVED: [id number]    Basal cell carcinoma of skin of upper extremity, including shoulder     BCC (basal cell carcinoma), trunk     BCE (basal cell epithelioma), trunk     RESOLVED: [id number]    Benign neoplasm of skin of face     Coronary artery disease involving native coronary artery of native heart without angina pectoris 10/16/2018    Depression 10/6/2015    Gastroesophageal reflux disease without esophagitis 4/21/2017    Hypertension     Neoplasm of uncertain behavior of skin     Nonmelanoma skin cancer     LAST ASSESSED: [id number]    Seborrheic keratosis     RESOLVED: [id number]    Stroke [facility])      Past Surgical History:   Procedure Laterality Date    ANAL FISTULOTOMY  06/13/2011    DR SANCHEZ    CORONARY ARTERY BYPASS GRAFT  05/17/2005    TRIPLE;   [address] [facility] [address] SURGERY  10/2009    DR Tenorio [facility] SURGERY  06/18/2015    JOINT REPLACEMENT; DR Garrett Last      Family History:     Family History   Problem Relation Age of Onset    Other Mother         BRAIN TUMOR    No Known Problems Father     Vascular Disease Brother     Cancer Brother       Social History:     E-Cigarette/Vaping    E-Cigarette Use Never User      E-Cigarette/Vaping Substances    Nicotine No     THC No     CBD No     Flavoring No     Other No     Unknown No      Social History     Socioeconomic History    Marital status: /Civil Union     Spouse name: None    Number of children: None    Years of education: None    Highest education level: None   Occupational History    None   Social Needs    Financial resource strain: None    Food insecurity     Worry: None     Inability: None    Transportation needs     Medical: None     Non-medical: None   Tobacco Use    Smoking status: Never Smoker    Smokeless tobacco: Never Used   Substance and Sexual Activity    Alcohol use: Yes     Frequency: Monthly or less     Drinks per session: 1 or 2     Binge frequency: Never    Drug use: No    Sexual activity: Not Currently   Lifestyle    Physical activity     Days per week: None     Minutes per session: None    Stress: None   Relationships    Social connections     Talks on phone: None     Gets together: None     Attends Taoism service: None     Active member of club or organization: None     Attends meetings of clubs or organizations: None     Relationship status: None    Intimate partner violence     Fear of current or ex partner: None     Emotionally abused: None Physically abused: None     Forced sexual activity: None   Other Topics Concern    None   Social History Narrative    None      Medications and Allergies:     Current Outpatient Medications   Medication Sig Dispense Refill    carvedilol (COREG) 25 mg tablet Take 1 tablet (25 mg total) by mouth 2 (two) times a day with meals 180 tablet 1    clopidogrel (PLAVIX) 75 mg tablet Take 75 mg by mouth daily      colchicine (COLCRYS) 0 6 mg tablet Take 1 tablet (0 6 mg total) by mouth 2 (two) times a day as needed (gout) 30 tablet 1    ergocalciferol (VITAMIN D2) 50,000 units Take 1 capsule (50,000 Units total) by mouth once a week 12 capsule 0    indomethacin (INDOCIN) 50 mg capsule Take 1 capsule (50 mg total) by mouth 2 (two) times a day as needed (gout) 30 capsule 1    Multiple Vitamins-Minerals (MULTIVITAMIN ADULT PO) Take 1 tablet by mouth daily      rosuvastatin (CRESTOR) 10 MG tablet Take 1 tablet (10 mg total) by mouth daily at bedtime 90 tablet 1     No current facility-administered medications for this visit  Allergies   Allergen Reactions    Other      [place] trees      Immunizations:     Immunization History   Administered Date(s) Administered    INFLUENZA 09/11/2018, 08/16/2020    Influenza Split High Dose Preservative Free IM 11/03/2016    Influenza, seasonal, injectable 10/13/2015, 09/27/2017    Pneumococcal Conjugate 13-Valent 10/06/2015    Pneumococcal Polysaccharide PPV23 05/13/2019    SARS-CoV-2 / COVID-19 mRNA IM (avox) 02/05/2021, 02/24/2021    Tdap 10/13/2015    influenza, trivalent, adjuvanted 08/28/2019      Health Maintenance:         Topic Date Due    Colonoscopy Surveillance  07/27/2021    Hepatitis C Screening  Completed     There are no preventive care reminders to display for this patient     Medicare Health Risk Assessment:     /80   Pulse (!) 52   Temp (!) 97 4 °F (36 3 °C) (Tympanic)   Ht 5' 3" (1 6 m)   Wt 92 2 kg (203 lb 3 2 oz)   SpO2 97% BMI 36 00 kg/m²      Lisa Snider is here for his Subsequent Wellness visit  Last Medicare Wellness visit information reviewed, patient interviewed and updates made to the record today  Health Risk Assessment:   Patient rates overall health as good  Patient feels that their physical health rating is same  Eyesight was rated as same  Hearing was rated as same  Patient feels that their emotional and mental health rating is same  Pain experienced in the last 7 days has been none  Patient states that he has experienced no weight loss or gain in last 6 months  Depression Screening:   PHQ-2 Score: 2      Fall Risk Screening: In the past year, patient has experienced: no history of falling in past year      Home Safety:  Patient does not have trouble with stairs inside or outside of their home  Patient has working smoke alarms and has working carbon monoxide detector  Home safety hazards include: none  Nutrition:   Current diet is Regular  Medications:   Patient is currently taking over-the-counter supplements  OTC medications include: see medication list  Patient is able to manage medications  Activities of Daily Living (ADLs)/Instrumental Activities of Daily Living (IADLs):   Walk and transfer into and out of bed and chair?: Yes  Dress and groom yourself?: Yes    Bathe or shower yourself?: Yes    Feed yourself? Yes  Do your laundry/housekeeping?: Yes  Manage your money, pay your bills and track your expenses?: Yes  Make your own meals?: Yes    Do your own shopping?: Yes    Durable Medical Equipment Suppliers  no preference    Previous Hospitalizations:   Any hospitalizations or ED visits within the last 12 months?: Yes    How many hospitalizations have you had in the last year?: 1-2    Advance Care Planning:   Living will: Yes    Durable POA for healthcare:  Yes    Advanced directive: Yes      Cognitive Screening:   Provider or family/friend/caregiver concerned regarding cognition?: No    PREVENTIVE SCREENINGS      Cardiovascular Screening:    General: Screening Not Indicated and History Lipid Disorder      Diabetes Screening:     General: Screening Current      Prostate Cancer Screening:    General: Screening Not Indicated      Osteoporosis Screening:    General: Risks and Benefits Discussed and Screening Not Indicated      Abdominal Aortic Aneurysm (AAA) Screening:        General: Screening Not Indicated      Lung Cancer Screening:     General: Screening Not Indicated      Hepatitis C Screening:    General: Screening Current    Screening, Brief Intervention, and Referral to Treatment (SBIRT)    Screening  Typical number of drinks in a day: 1  Typical number of drinks in a week: 0    AUDIT-C Screenin) How often did you have a drink containing alcohol in the past year? monthly or less  3) How often did you have 6 or more drinks on one occasion in the past year? never    AUDIT-C Score: 1    Other Counseling Topics:   Car/seat belt/driving safety, skin self-exam, sunscreen and calcium and vitamin D intake and regular weightbearing exercise         Elen Vora MD

## 2021-03-26 ENCOUNTER — TELEPHONE (OUTPATIENT)
Dept: INTERNAL MEDICINE CLINIC | Facility: CLINIC | Age: 76
End: 2021-03-26

## 2021-03-26 DIAGNOSIS — E78.00 HYPERCHOLESTEROLEMIA: ICD-10-CM

## 2021-03-26 DIAGNOSIS — I63.9 CEREBELLAR STROKE (HCC): Primary | ICD-10-CM

## 2021-03-26 RX ORDER — CARVEDILOL 25 MG/1
25 TABLET ORAL 2 TIMES DAILY WITH MEALS
Qty: 180 TABLET | Refills: 1 | Status: SHIPPED | OUTPATIENT
Start: 2021-03-26 | End: 2021-12-23 | Stop reason: SDUPTHER

## 2021-03-26 RX ORDER — CLOPIDOGREL BISULFATE 75 MG/1
75 TABLET ORAL DAILY
Qty: 90 TABLET | Refills: 1 | Status: SHIPPED | OUTPATIENT
Start: 2021-03-26 | End: 2021-11-20 | Stop reason: SDUPTHER

## 2021-03-26 NOTE — TELEPHONE ENCOUNTER
Patient stopped in requesting a refill for the clopidogrel (PLAVIX) 75 mg tablet Take 75 mg by mouth daily and carvedilol (COREG) 25 mg tablet Take 1 tablet (25 mg total) by mouth 2 (two) times a day with meals  Please send to [facility] on [phone] in [place]

## 2021-05-19 LAB
ALBUMIN/CREAT UR: 3 MG/G CREAT (ref 0–29)
ALT SERPL-CCNC: 18 IU/L (ref 0–44)
AST SERPL-CCNC: 20 IU/L (ref 0–40)
BASOPHILS # BLD AUTO: 0 X10E3/UL (ref 0–0.2)
BASOPHILS NFR BLD AUTO: 0 %
BUN SERPL-MCNC: 22 MG/DL (ref 8–27)
BUN/CREAT SERPL: 15 (ref 10–24)
CALCIUM SERPL-MCNC: 9.5 MG/DL (ref 8.6–10.2)
CHLORIDE SERPL-SCNC: 105 MMOL/L (ref 96–106)
CHOLEST SERPL-MCNC: 106 MG/DL (ref 100–199)
CO2 SERPL-SCNC: 21 MMOL/L (ref 20–29)
CREAT SERPL-MCNC: 1.49 MG/DL (ref 0.76–1.27)
CREAT UR-MCNC: 159.4 MG/DL
EOSINOPHIL # BLD AUTO: 0.2 X10E3/UL (ref 0–0.4)
EOSINOPHIL NFR BLD AUTO: 3 %
ERYTHROCYTE [DISTWIDTH] IN BLOOD BY AUTOMATED COUNT: 13.9 % (ref 11.6–15.4)
GLUCOSE SERPL-MCNC: 123 MG/DL (ref 65–99)
HBA1C MFR BLD: 6.4 % (ref 4.8–5.6)
HCT VFR BLD AUTO: 44.9 % (ref 37.5–51)
HDLC SERPL-MCNC: 41 MG/DL
HGB BLD-MCNC: 15.1 G/DL (ref 13–17.7)
IMM GRANULOCYTES # BLD: 0 X10E3/UL (ref 0–0.1)
IMM GRANULOCYTES NFR BLD: 1 %
LDLC SERPL CALC-MCNC: 45 MG/DL (ref 0–99)
LYMPHOCYTES # BLD AUTO: 1.8 X10E3/UL (ref 0.7–3.1)
LYMPHOCYTES NFR BLD AUTO: 23 %
MCH RBC QN AUTO: 30.3 PG (ref 26.6–33)
MCHC RBC AUTO-ENTMCNC: 33.6 G/DL (ref 31.5–35.7)
MCV RBC AUTO: 90 FL (ref 79–97)
MICROALBUMIN UR-MCNC: 4.9 UG/ML
MONOCYTES # BLD AUTO: 0.7 X10E3/UL (ref 0.1–0.9)
MONOCYTES NFR BLD AUTO: 9 %
NEUTROPHILS # BLD AUTO: 5 X10E3/UL (ref 1.4–7)
NEUTROPHILS NFR BLD AUTO: 64 %
PLATELET # BLD AUTO: 727 X10E3/UL (ref 150–450)
POTASSIUM SERPL-SCNC: 4.9 MMOL/L (ref 3.5–5.2)
RBC # BLD AUTO: 4.98 X10E6/UL (ref 4.14–5.8)
SL AMB EGFR AFRICAN AMERICAN: 52 ML/MIN/1.73
SL AMB EGFR NON AFRICAN AMERICAN: 45 ML/MIN/1.73
SODIUM SERPL-SCNC: 140 MMOL/L (ref 134–144)
TRIGL SERPL-MCNC: 107 MG/DL (ref 0–149)
URATE SERPL-MCNC: 8.9 MG/DL (ref 3.8–8.4)
WBC # BLD AUTO: 7.7 X10E3/UL (ref 3.4–10.8)

## 2021-06-02 ENCOUNTER — OFFICE VISIT (OUTPATIENT)
Dept: INTERNAL MEDICINE CLINIC | Facility: CLINIC | Age: 76
End: 2021-06-02
Payer: COMMERCIAL

## 2021-06-02 VITALS
HEIGHT: 63 IN | HEART RATE: 53 BPM | WEIGHT: 195 LBS | TEMPERATURE: 97.9 F | OXYGEN SATURATION: 98 % | SYSTOLIC BLOOD PRESSURE: 110 MMHG | BODY MASS INDEX: 34.55 KG/M2 | DIASTOLIC BLOOD PRESSURE: 76 MMHG

## 2021-06-02 DIAGNOSIS — M10.09 ACUTE IDIOPATHIC GOUT OF MULTIPLE SITES: ICD-10-CM

## 2021-06-02 DIAGNOSIS — N18.31 TYPE 2 DIABETES MELLITUS WITH STAGE 3A CHRONIC KIDNEY DISEASE, WITHOUT LONG-TERM CURRENT USE OF INSULIN (HCC): Primary | ICD-10-CM

## 2021-06-02 DIAGNOSIS — Z86.010 HISTORY OF COLON POLYPS: ICD-10-CM

## 2021-06-02 DIAGNOSIS — E11.22 TYPE 2 DIABETES MELLITUS WITH STAGE 3A CHRONIC KIDNEY DISEASE, WITHOUT LONG-TERM CURRENT USE OF INSULIN (HCC): Primary | ICD-10-CM

## 2021-06-02 DIAGNOSIS — I10 BENIGN ESSENTIAL HTN: ICD-10-CM

## 2021-06-02 DIAGNOSIS — I25.10 CORONARY ARTERY DISEASE INVOLVING NATIVE CORONARY ARTERY OF NATIVE HEART WITHOUT ANGINA PECTORIS: ICD-10-CM

## 2021-06-02 DIAGNOSIS — N18.31 STAGE 3A CHRONIC KIDNEY DISEASE (HCC): ICD-10-CM

## 2021-06-02 PROCEDURE — 3725F SCREEN DEPRESSION PERFORMED: CPT | Performed by: INTERNAL MEDICINE

## 2021-06-02 PROCEDURE — 3074F SYST BP LT 130 MM HG: CPT | Performed by: INTERNAL MEDICINE

## 2021-06-02 PROCEDURE — 99214 OFFICE O/P EST MOD 30 MIN: CPT | Performed by: INTERNAL MEDICINE

## 2021-06-02 PROCEDURE — 1036F TOBACCO NON-USER: CPT | Performed by: INTERNAL MEDICINE

## 2021-06-02 PROCEDURE — 1160F RVW MEDS BY RX/DR IN RCRD: CPT | Performed by: INTERNAL MEDICINE

## 2021-06-02 PROCEDURE — 3078F DIAST BP <80 MM HG: CPT | Performed by: INTERNAL MEDICINE

## 2021-06-02 RX ORDER — ALLOPURINOL 100 MG/1
100 TABLET ORAL DAILY
Qty: 90 TABLET | Refills: 3 | Status: SHIPPED | OUTPATIENT
Start: 2021-06-02

## 2021-06-02 NOTE — PROGRESS NOTES
Assessment/Plan:    1  Type 2 diabetes mellitus  Presently patient is not on any diabetic medicine  He just doing with the better diet control and exercise  Recent hemoglobin A1c 6 4  Will continue to monitor    2  Hyperuricemia/gout  Uric acid is elevated  Will start patient on allopurinol 100 mg daily  Will check uric acid level before next visit and dose will be adjusted accordingly    3  CKD stage 3  Renal function is relatively stable  Will continue to monitor    4  Essential hypertension  Blood pressure is stable on present regimen    5  Hyperlipidemia  Lipid profile is in excellent range  Continue with Crestor 10 mg daily    6  Coronary artery disease  Stable  Being followed by Cardiology     Diagnoses and all orders for this visit:    Type 2 diabetes mellitus with stage 3a chronic kidney disease, without long-term current use of insulin ([facility] [address] )  -     Ambulatory referral to Ophthalmology; Future  -     Hemoglobin A1C; Future    History of colon polyps  -     Ambulatory referral to Gastroenterology; Future    Benign essential HTN    Coronary artery disease involving native coronary artery of native heart without angina pectoris    Stage 3a chronic kidney disease (HCC)  -     Basic metabolic panel; Future    Acute idiopathic gout of multiple sites  -     allopurinol (ZYLOPRIM) 100 mg tablet; Take 1 tablet (100 mg total) by mouth daily  -     Uric acid; Future               Subjective:          Patient ID: Clemente Overton is a 66 y o  male  Patient is here for regular follow-up  Does have blood work prior to this visit and would like to be discussed  Otherwise no new complaints  The following portions of the patient's history were reviewed and updated as appropriate: allergies, current medications, past family history, past medical history, past social history, past surgical history and problem list     Review of Systems   Constitutional: Negative for fatigue and fever     HENT: Negative for congestion, ear discharge, ear pain, postnasal drip, sinus pressure, sore throat, tinnitus and trouble swallowing  Eyes: Negative for discharge, itching and visual disturbance  Respiratory: Negative for cough and shortness of breath  Cardiovascular: Negative for chest pain and palpitations  Gastrointestinal: Negative for abdominal pain, diarrhea, nausea and vomiting  Endocrine: Negative for cold intolerance and polyuria  Genitourinary: Negative for difficulty urinating, dysuria and urgency  Musculoskeletal: Positive for arthralgias  Negative for neck pain  Skin: Negative for rash  Allergic/Immunologic: Negative for environmental allergies  Neurological: Negative for dizziness, weakness and headaches  Psychiatric/Behavioral: Negative for agitation and behavioral problems  The patient is not nervous/anxious            Past Medical History:   Diagnosis Date    Actinic keratosis     RESOLVED: [id number]    Adolescent idiopathic scoliosis of thoracolumbar region 4/12/2018    Basal cell carcinoma of back     RESOLVED: [id number]    Basal cell carcinoma of skin of upper extremity, including shoulder     BCC (basal cell carcinoma), trunk     BCE (basal cell epithelioma), trunk     RESOLVED: [id number]    Benign neoplasm of skin of face     Coronary artery disease involving native coronary artery of native heart without angina pectoris 10/16/2018    Depression 10/6/2015    Gastroesophageal reflux disease without esophagitis 4/21/2017    Hypertension     Neoplasm of uncertain behavior of skin     Nonmelanoma skin cancer     LAST ASSESSED: [id number]    Seborrheic keratosis     RESOLVED: [id number]    [facility]          Current Outpatient Medications:     carvedilol (COREG) 25 mg tablet, Take 1 tablet (25 mg total) by mouth 2 (two) times a day with meals, Disp: 180 tablet, Rfl: 1    clopidogrel (PLAVIX) 75 mg tablet, Take 1 tablet (75 mg total) by mouth daily, Disp: 90 tablet, Rfl: 1   colchicine (COLCRYS) 0 6 mg tablet, Take 1 tablet (0 6 mg total) by mouth 2 (two) times a day as needed (gout), Disp: 30 tablet, Rfl: 1    ergocalciferol (VITAMIN D2) 50,000 units, Take 1 capsule (50,000 Units total) by mouth once a week, Disp: 12 capsule, Rfl: 0    losartan (COZAAR) 50 mg tablet, Take 1 tablet (50 mg total) by mouth daily, Disp: 90 tablet, Rfl: 1    Multiple Vitamins-Minerals (MULTIVITAMIN ADULT PO), Take 1 tablet by mouth daily, Disp: , Rfl:     rosuvastatin (CRESTOR) 10 MG tablet, Take 1 tablet (10 mg total) by mouth daily at bedtime, Disp: 90 tablet, Rfl: 1    allopurinol (ZYLOPRIM) 100 mg tablet, Take 1 tablet (100 mg total) by mouth daily, Disp: 90 tablet, Rfl: 3    Allergies   Allergen Reactions    Other      [place] trees       Social History   Past Surgical History:   Procedure Laterality Date    ANAL FISTULOTOMY  06/13/2011    DR SANCHEZ    CORONARY ARTERY BYPASS GRAFT  05/17/2005    TRIPLE; DR Weiner [facility] SURGERY  10/2009      [address] [place] [address] SURGERY  06/18/2015    JOINT REPLACEMENT; DR Marla Mendez     Family History   Problem Relation Age of Onset    Other Mother         BRAIN TUMOR    No Known Problems Father     Vascular Disease Brother     Cancer Brother        Objective:  /76 (BP Location: Left arm)   Pulse (!) 53   Temp 97 9 °F (36 6 °C) (Tympanic)   Ht 5' 2 5" (1 588 m)   Wt 88 5 kg (195 lb)   SpO2 98% Comment: Room Air  BMI 35 10 kg/m²   Body mass index is 35 1 kg/m²  Physical Exam  Constitutional:       Appearance: He is well-developed  HENT:      Head: Normocephalic  Right Ear: External ear normal       Left Ear: External ear normal    Eyes:      General: No scleral icterus  Pupils: Pupils are equal, round, and reactive to light  Neck:      Musculoskeletal: Normal range of motion and neck supple  Thyroid: No thyromegaly  Trachea: No tracheal deviation     Cardiovascular:      Rate and Rhythm: Normal rate and regular rhythm  Heart sounds: Normal heart sounds  No murmur  Pulmonary:      Effort: Pulmonary effort is normal  No respiratory distress  Breath sounds: Normal breath sounds  Chest:      Chest wall: No tenderness  Abdominal:      General: Bowel sounds are normal       Palpations: Abdomen is soft  There is no mass  Tenderness: There is no abdominal tenderness  Musculoskeletal: Normal range of motion  Right lower leg: No edema  Left lower leg: No edema  Lymphadenopathy:      Cervical: No cervical adenopathy  Skin:     General: Skin is warm  Findings: No erythema or rash  Neurological:      Mental Status: He is alert and oriented to person, place, and time  Cranial Nerves: No cranial nerve deficit  Psychiatric:         Mood and Affect: Mood normal          Behavior: Behavior normal          Thought Content:  Thought content normal          Judgment: Judgment normal

## 2021-06-02 NOTE — PROGRESS NOTES
Diabetic Foot Exam    Patient's shoes and socks removed  Right Foot/Ankle   Right Foot Inspection      Sensory       Monofilament testing: intact  Vascular    The right DP pulse is 2+  The right PT pulse is 2+  Left Foot/Ankle  Left Foot Inspection                                           Sensory       Monofilament: intact  Vascular    The left DP pulse is 2+  The left PT pulse is 2+  Assign Risk Category:  No deformity present; No loss of protective sensation;  No weak pulses       Risk: 0

## 2021-07-02 DIAGNOSIS — E78.00 HYPERCHOLESTEROLEMIA: ICD-10-CM

## 2021-07-02 DIAGNOSIS — I10 BENIGN ESSENTIAL HTN: ICD-10-CM

## 2021-07-02 RX ORDER — ROSUVASTATIN CALCIUM 10 MG/1
10 TABLET, COATED ORAL
Qty: 90 TABLET | Refills: 1 | Status: SHIPPED | OUTPATIENT
Start: 2021-07-02

## 2021-07-02 RX ORDER — LOSARTAN POTASSIUM 50 MG/1
TABLET ORAL
Qty: 90 TABLET | Refills: 1 | Status: SHIPPED | OUTPATIENT
Start: 2021-07-02 | End: 2022-07-06 | Stop reason: SDUPTHER

## 2021-07-13 ENCOUNTER — TELEMEDICINE (OUTPATIENT)
Dept: INTERNAL MEDICINE CLINIC | Facility: CLINIC | Age: 76
End: 2021-07-13
Payer: COMMERCIAL

## 2021-07-13 ENCOUNTER — APPOINTMENT (OUTPATIENT)
Dept: RADIOLOGY | Age: 76
End: 2021-07-13
Payer: COMMERCIAL

## 2021-07-13 ENCOUNTER — APPOINTMENT (OUTPATIENT)
Dept: LAB | Age: 76
End: 2021-07-13
Payer: COMMERCIAL

## 2021-07-13 VITALS
SYSTOLIC BLOOD PRESSURE: 121 MMHG | TEMPERATURE: 99 F | DIASTOLIC BLOOD PRESSURE: 73 MMHG | BODY MASS INDEX: 35.1 KG/M2 | WEIGHT: 195 LBS

## 2021-07-13 DIAGNOSIS — R50.9 FEVER, UNSPECIFIED FEVER CAUSE: ICD-10-CM

## 2021-07-13 DIAGNOSIS — R05.9 COUGH: ICD-10-CM

## 2021-07-13 DIAGNOSIS — R05.9 COUGH: Primary | ICD-10-CM

## 2021-07-13 LAB
ALBUMIN SERPL BCP-MCNC: 3.6 G/DL (ref 3.5–5)
ALP SERPL-CCNC: 55 U/L (ref 46–116)
ALT SERPL W P-5'-P-CCNC: 24 U/L (ref 12–78)
ANION GAP SERPL CALCULATED.3IONS-SCNC: 7 MMOL/L (ref 4–13)
AST SERPL W P-5'-P-CCNC: 23 U/L (ref 5–45)
BASOPHILS # BLD AUTO: 0.02 THOUSANDS/ΜL (ref 0–0.1)
BASOPHILS NFR BLD AUTO: 0 % (ref 0–1)
BILIRUB SERPL-MCNC: 0.7 MG/DL (ref 0.2–1)
BUN SERPL-MCNC: 25 MG/DL (ref 5–25)
CALCIUM SERPL-MCNC: 9.3 MG/DL (ref 8.3–10.1)
CHLORIDE SERPL-SCNC: 101 MMOL/L (ref 100–108)
CO2 SERPL-SCNC: 25 MMOL/L (ref 21–32)
CREAT SERPL-MCNC: 2.01 MG/DL (ref 0.6–1.3)
EOSINOPHIL # BLD AUTO: 0 THOUSAND/ΜL (ref 0–0.61)
EOSINOPHIL NFR BLD AUTO: 0 % (ref 0–6)
ERYTHROCYTE [DISTWIDTH] IN BLOOD BY AUTOMATED COUNT: 14.2 % (ref 11.6–15.1)
GFR SERPL CREATININE-BSD FRML MDRD: 31 ML/MIN/1.73SQ M
GLUCOSE P FAST SERPL-MCNC: 167 MG/DL (ref 65–99)
HCT VFR BLD AUTO: 43.8 % (ref 36.5–49.3)
HGB BLD-MCNC: 14.9 G/DL (ref 12–17)
IMM GRANULOCYTES # BLD AUTO: 0.14 THOUSAND/UL (ref 0–0.2)
IMM GRANULOCYTES NFR BLD AUTO: 1 % (ref 0–2)
LYMPHOCYTES # BLD AUTO: 0.93 THOUSANDS/ΜL (ref 0.6–4.47)
LYMPHOCYTES NFR BLD AUTO: 7 % (ref 14–44)
MCH RBC QN AUTO: 31.1 PG (ref 26.8–34.3)
MCHC RBC AUTO-ENTMCNC: 34 G/DL (ref 31.4–37.4)
MCV RBC AUTO: 91 FL (ref 82–98)
MONOCYTES # BLD AUTO: 1.14 THOUSAND/ΜL (ref 0.17–1.22)
MONOCYTES NFR BLD AUTO: 8 % (ref 4–12)
NEUTROPHILS # BLD AUTO: 11.83 THOUSANDS/ΜL (ref 1.85–7.62)
NEUTS SEG NFR BLD AUTO: 84 % (ref 43–75)
NRBC BLD AUTO-RTO: 0 /100 WBCS
PLATELET # BLD AUTO: 614 THOUSANDS/UL (ref 149–390)
PMV BLD AUTO: 10.9 FL (ref 8.9–12.7)
POTASSIUM SERPL-SCNC: 4 MMOL/L (ref 3.5–5.3)
PROT SERPL-MCNC: 7.7 G/DL (ref 6.4–8.2)
RBC # BLD AUTO: 4.79 MILLION/UL (ref 3.88–5.62)
SODIUM SERPL-SCNC: 133 MMOL/L (ref 136–145)
WBC # BLD AUTO: 14.06 THOUSAND/UL (ref 4.31–10.16)

## 2021-07-13 PROCEDURE — 85025 COMPLETE CBC W/AUTO DIFF WBC: CPT

## 2021-07-13 PROCEDURE — 71046 X-RAY EXAM CHEST 2 VIEWS: CPT

## 2021-07-13 PROCEDURE — 36415 COLL VENOUS BLD VENIPUNCTURE: CPT

## 2021-07-13 PROCEDURE — U0005 INFEC AGEN DETEC AMPLI PROBE: HCPCS | Performed by: NURSE PRACTITIONER

## 2021-07-13 PROCEDURE — 99442 PR PHYS/QHP TELEPHONE EVALUATION 11-20 MIN: CPT | Performed by: NURSE PRACTITIONER

## 2021-07-13 PROCEDURE — U0003 INFECTIOUS AGENT DETECTION BY NUCLEIC ACID (DNA OR RNA); SEVERE ACUTE RESPIRATORY SYNDROME CORONAVIRUS 2 (SARS-COV-2) (CORONAVIRUS DISEASE [COVID-19]), AMPLIFIED PROBE TECHNIQUE, MAKING USE OF HIGH THROUGHPUT TECHNOLOGIES AS DESCRIBED BY CMS-2020-01-R: HCPCS | Performed by: NURSE PRACTITIONER

## 2021-07-13 PROCEDURE — 80053 COMPREHEN METABOLIC PANEL: CPT

## 2021-07-13 RX ORDER — AZITHROMYCIN 250 MG/1
TABLET, FILM COATED ORAL
Qty: 6 TABLET | Refills: 0 | Status: SHIPPED | OUTPATIENT
Start: 2021-07-13 | End: 2021-07-18

## 2021-07-13 NOTE — PROGRESS NOTES
COVID-19 Outpatient Progress Note    Assessment/Plan:    Problem List Items Addressed This Visit     None      Visit Diagnoses     Cough    -  Primary    Relevant Medications    azithromycin (Zithromax) 250 mg tablet    Other Relevant Orders    XR chest pa & lateral    CBC and differential    Comprehensive metabolic panel    Novel Coronavirus (COVID-19), PCR [facility] Collected at [facility]    Fever, unspecified fever cause        Relevant Medications    azithromycin (Zithromax) 250 mg tablet    Other Relevant Orders    XR chest pa & lateral    CBC and differential    Comprehensive metabolic panel    Novel Coronavirus (COVID-19), PCR [facility] Collected at [facility]         Disposition:     I referred patient to one of our centralized sites for a COVID-19 swab  Start azithromycin  Get labs completed  Check COVID swab  Get CXR  Reviewed red flag signs to call the office with or go to the Emergency Department with  Patient verbalizes understanding  '  Virtual F/u in 2-3 days    I have spent 11 minutes directly with the patient  Greater than 50% of this time was spent in counseling/coordination of care regarding: prognosis, risks and benefits of treatment options, instructions for management, patient and family education, importance of treatment compliance, risk factor reductions and impressions  Verification of patient location:    Patient is currently located in the state of [place]  Patient is currently located in a state in which I am licensed    Encounter provider CASEY Nielsen    Provider located at [address] [place] [id number]    Recent Visits  No visits were found meeting these conditions    Showing recent visits within past 7 days and meeting all other requirements  Today's Visits  Date Type Provider Dept   07/13/21 Telemedicine Sabina Schirmer [place]ting, CRNP [facility]   Showing today's visits and meeting all other requirements  Future Appointments  No visits were found meeting these conditions  Showing future appointments within next 150 days and meeting all other requirements       Patient agrees to participate in a virtual check in via telephone or video visit instead of presenting to the office to address urgent/immediate medical needs  Patient is aware this is a billable service  After connecting through Telephone, the patient was identified by name and date of birth  Lay Marcial was informed that this was a telemedicine visit and that the exam was being conducted confidentially over secure lines  My office door was closed  No one else was in the room  Lay Marcial acknowledged consent and understanding of privacy and security of the telemedicine visit  I informed the patient that I have reviewed his record in Epic and presented the opportunity for him to ask any questions regarding the visit today  The patient agreed to participate  Subjective:   Lay Marcial is a 64 y o  male who is concerned about COVID-19  Patient's symptoms include fever (101 4 last night), fatigue, rhinorrhea, sore throat, cough (painful cough), abdominal pain, nausea, diarrhea and myalgias  Patient denies chills, congestion, anosmia, loss of taste, shortness of breath, chest tightness, vomiting and headaches       Date of symptom onset: 7/11/2021    Exposure:   Contact with a person who is under investigation (PUI) for or who is positive for COVID-19 within the last 14 days?: No    Hospitalized recently for fever and/or lower respiratory symptoms?: No      Currently a healthcare worker that is involved in direct patient care?: No      Works in a special setting where the risk of COVID-19 transmission may be high? (this may include long-term care, correctional and [facility] facilities; homeless shelters; assisted-living facilities and group homes ): No      Resident in a special setting where the risk of COVID-19 transmission may be high? (this may include long-term care, correctional and intermediate facilities; homeless shelters; assisted-living facilities and group homes ): No      He reports sinus congestion, breathing feels normal    He has been fully vaccinated against COVID  He has not had any sick contacts recently  He has been taking tylenol, with good relief  No results found for: Kodi Ward, ALEXX, [address]  Past Medical History:   Diagnosis Date    Actinic keratosis     RESOLVED: [id number]    Adolescent idiopathic scoliosis of thoracolumbar region 4/12/2018    Basal cell carcinoma of back     RESOLVED: [id number]    Basal cell carcinoma of skin of upper extremity, including shoulder     BCC (basal cell carcinoma), trunk     BCE (basal cell epithelioma), trunk     RESOLVED: [id number]    Benign neoplasm of skin of face     Coronary artery disease involving native coronary artery of native heart without angina pectoris 10/16/2018    Depression 10/6/2015    Gastroesophageal reflux disease without esophagitis 4/21/2017    Hypertension     Neoplasm of uncertain behavior of skin     Nonmelanoma skin cancer     LAST ASSESSED: [id number]    Seborrheic keratosis     RESOLVED: [id number]    Stroke [facility])      Past Surgical History:   Procedure Laterality Date    ANAL FISTULOTOMY  06/13/2011    DR SANCHEZ    CORONARY ARTERY BYPASS GRAFT  05/17/2005    TRIPLE;   [address] [facility] [address] SURGERY  10/2009    DR Cecelia Patrick SHOULDER SURGERY  06/18/2015    JOINT REPLACEMENT; DR Africa Nugent     Current Outpatient Medications   Medication Sig Dispense Refill    allopurinol (ZYLOPRIM) 100 mg tablet Take 1 tablet (100 mg total) by mouth daily 90 tablet 3    carvedilol (COREG) 25 mg tablet Take 1 tablet (25 mg total) by mouth 2 (two) times a day with meals 180 tablet 1    clopidogrel (PLAVIX) 75 mg tablet Take 1 tablet (75 mg total) by mouth daily 90 tablet 1    colchicine (COLCRYS) 0 6 mg tablet Take 1 tablet (0 6 mg total) by mouth 2 (two) times a day as needed (gout) 30 tablet 1    ergocalciferol (VITAMIN D2) 50,000 units Take 1 capsule (50,000 Units total) by mouth once a week 12 capsule 0    losartan (COZAAR) 50 mg tablet TAKE ONE TABLET BY MOUTH ONE TIME DAILY  90 tablet 1    Multiple Vitamins-Minerals (MULTIVITAMIN ADULT PO) Take 1 tablet by mouth daily      rosuvastatin (CRESTOR) 10 MG tablet Take 1 tablet (10 mg total) by mouth daily at bedtime 90 tablet 1    azithromycin (Zithromax) 250 mg tablet Take 2 tablets (500 mg total) by mouth daily for 1 day, THEN 1 tablet (250 mg total) daily for 4 days  6 tablet 0     No current facility-administered medications for this visit  Allergies   Allergen Reactions    Other      [place] trees       Review of Systems   Constitutional: Positive for fatigue and fever (101 4 last night)  Negative for chills  HENT: Positive for rhinorrhea and sore throat  Negative for congestion  Respiratory: Positive for cough (painful cough)  Negative for chest tightness and shortness of breath  Cardiovascular: Negative for chest pain and leg swelling  Gastrointestinal: Positive for abdominal pain, diarrhea and nausea  Negative for vomiting  Musculoskeletal: Positive for myalgias  Skin: Negative for rash  Neurological: Positive for light-headedness  Negative for dizziness and headaches  Objective:    Vitals:    07/13/21 1039   BP: 121/73   Temp: 99 °F (37 2 °C)   Weight: 88 5 kg (195 lb)       Physical Exam  Constitutional:       General: He is not in acute distress  Pulmonary:      Effort: No respiratory distress (no audible respiratory distress)  Neurological:      Mental Status: He is oriented to person, place, and time  VIRTUAL VISIT Linda Alexis 77 verbally agrees to participate in [employer]   Pt is aware that Virtual Care Services could be limited without vital signs or the ability to perform a full hands-on physical [place] understands he or the provider may request at any time to terminate the video visit and request the patient to seek care or treatment in person

## 2021-07-14 ENCOUNTER — TELEPHONE (OUTPATIENT)
Dept: INTERNAL MEDICINE CLINIC | Facility: CLINIC | Age: 76
End: 2021-07-14

## 2021-07-14 NOTE — TELEPHONE ENCOUNTER
Spoke to Leonard before calling patient to get him scheduled for Friday and she stated not to worry about calling him because she sent him to the ER today

## 2021-07-14 NOTE — TELEPHONE ENCOUNTER
----- Message from [address] Dow sent at 7/13/2021  2:40 PM EDT -----  + pneumonia  COVID test pending  Called patient, discussed red flag signs to go to the ER  On azithromycin  Please have him come in to be seen if covid negative on Friday to see a provider

## 2021-07-19 ENCOUNTER — TRANSITIONAL CARE MANAGEMENT (OUTPATIENT)
Dept: INTERNAL MEDICINE CLINIC | Age: 76
End: 2021-07-19

## 2021-07-27 ENCOUNTER — OFFICE VISIT (OUTPATIENT)
Dept: INTERNAL MEDICINE CLINIC | Age: 76
End: 2021-07-27
Payer: COMMERCIAL

## 2021-07-27 VITALS
TEMPERATURE: 98.1 F | SYSTOLIC BLOOD PRESSURE: 120 MMHG | OXYGEN SATURATION: 95 % | WEIGHT: 184.7 LBS | HEIGHT: 63 IN | BODY MASS INDEX: 32.73 KG/M2 | HEART RATE: 64 BPM | DIASTOLIC BLOOD PRESSURE: 72 MMHG

## 2021-07-27 DIAGNOSIS — M10.9 ACUTE GOUTY ARTHRITIS: ICD-10-CM

## 2021-07-27 DIAGNOSIS — R79.89 LOW VITAMIN D LEVEL: ICD-10-CM

## 2021-07-27 DIAGNOSIS — N18.31 TYPE 2 DIABETES MELLITUS WITH STAGE 3A CHRONIC KIDNEY DISEASE, WITHOUT LONG-TERM CURRENT USE OF INSULIN (HCC): Primary | ICD-10-CM

## 2021-07-27 DIAGNOSIS — N18.31 STAGE 3A CHRONIC KIDNEY DISEASE (HCC): ICD-10-CM

## 2021-07-27 DIAGNOSIS — I25.10 CORONARY ARTERY DISEASE INVOLVING NATIVE CORONARY ARTERY OF NATIVE HEART WITHOUT ANGINA PECTORIS: ICD-10-CM

## 2021-07-27 DIAGNOSIS — E11.22 TYPE 2 DIABETES MELLITUS WITH STAGE 3A CHRONIC KIDNEY DISEASE, WITHOUT LONG-TERM CURRENT USE OF INSULIN (HCC): Primary | ICD-10-CM

## 2021-07-27 DIAGNOSIS — I10 BENIGN ESSENTIAL HTN: ICD-10-CM

## 2021-07-27 PROCEDURE — 99495 TRANSJ CARE MGMT MOD F2F 14D: CPT | Performed by: INTERNAL MEDICINE

## 2021-07-27 RX ORDER — PREDNISONE 20 MG/1
20 TABLET ORAL 2 TIMES DAILY WITH MEALS
Qty: 10 TABLET | Refills: 0 | Status: SHIPPED | OUTPATIENT
Start: 2021-07-27 | End: 2021-08-01

## 2021-07-27 RX ORDER — BENZONATATE 100 MG/1
CAPSULE ORAL
COMMUNITY
Start: 2021-07-18 | End: 2022-01-04

## 2021-07-27 RX ORDER — COLCHICINE 0.6 MG/1
0.6 TABLET ORAL DAILY
Qty: 30 TABLET | Refills: 1 | Status: SHIPPED | OUTPATIENT
Start: 2021-07-27

## 2021-07-27 RX ORDER — FLUTICASONE PROPIONATE 50 MCG
SPRAY, SUSPENSION (ML) NASAL
COMMUNITY
Start: 2021-07-18 | End: 2022-01-04

## 2021-07-27 NOTE — PROGRESS NOTES
Assessment/Plan:    1  Status post pneumonia  Patient was admitted to hospital with right upper lobe pneumonia was treated with IV antibiotic in the hospital done Levaquin after discharge  Completely resolved  No cough no fever chills    2  Acute gouty arthritis of right big toe  Will start patient on prednisone 20 mg p o  B i d  For 5 days  Colchicine 0 6 mg p o  B i d  For 3 days then once a day for the next 1 week or until resolve  Continue with the allopurinol  Will check uric acid level before next visit    3  Coronary artery disease  Stable  Continue with present regimen  Patient also being followed by cardiologist    4  Type 2 diabetes mellitus  Recent hemoglobin A1c is 6 8  Will continue with present lifestyle changes including lesion diabetic diet  Patient is not on any diabetic medicine  Diagnoses and all orders for this visit:    Type 2 diabetes mellitus with stage 3a chronic kidney disease, without long-term current use of insulin (HCC)    Benign essential HTN    Coronary artery disease involving native coronary artery of native heart without angina pectoris    Low vitamin D level    Stage 3a chronic kidney disease (HCC)    Acute gouty arthritis  -     predniSONE 20 mg tablet; Take 1 tablet (20 mg total) by mouth 2 (two) times a day with meals for 5 days  -     colchicine (COLCRYS) 0 6 mg tablet; Take 1 tablet (0 6 mg total) by mouth daily    Other orders  -     benzonatate (TESSALON PERLES) 100 mg capsule;  (Patient not taking: Reported on 7/27/2021)  -     fluticasone (FLONASE) 50 mcg/act nasal spray;  (Patient not taking: Reported on 7/27/2021)          Subjective:          Patient ID: Shannon Xiong is a 64 y o  male      TCM Call (since 6/26/2021)     Date and time call was made  7/19/2021 11:49 AM    Hospital care reviewed  Records reviewed    Patient was hospitialized at  [facility]        Date of Admission  07/14/21    Date of discharge  07/18/21 Diagnosis  pneumonia    Disposition  Home    Were the patients medications reviewed and updated  Yes      TCM Call (since 6/26/2021)     None          This is a follow-up after hospitalization  [facility] was treated with IV antibiotic done over Levaquin after discharge  He is doing well from pneumonia point of view  But he started pain and swelling his right foot about 4 5 days ago and is painful and have a difficulty with walking  Patient does have a history of gout in the past       The following portions of the patient's history were reviewed and updated as appropriate: allergies, current medications, past family history, past medical history, past social history, past surgical history and problem list   Depression Screening and Follow-up Plan: Clincally patient does not have depression  No treatment is required       Review of Systems   Constitutional: Negative for fatigue and fever  HENT: Negative for congestion, ear discharge, ear pain, postnasal drip, sinus pressure, sore throat, tinnitus and trouble swallowing  Eyes: Negative for discharge, itching and visual disturbance  Respiratory: Negative for cough and shortness of breath  Cardiovascular: Negative for chest pain and palpitations  Gastrointestinal: Negative for abdominal pain, diarrhea, nausea and vomiting  Endocrine: Negative for cold intolerance and polyuria  Genitourinary: Negative for difficulty urinating, dysuria and urgency  Musculoskeletal: Positive for arthralgias  Negative for neck pain  Skin: Negative for rash  Allergic/Immunologic: Negative for environmental allergies  Neurological: Negative for dizziness, weakness and headaches  Psychiatric/Behavioral: Negative for agitation and behavioral problems  The patient is not nervous/anxious            Past Medical History:   Diagnosis Date    Actinic keratosis     RESOLVED: [id number]    Adolescent idiopathic scoliosis of thoracolumbar region 4/12/2018    Basal cell carcinoma of back     RESOLVED: [id number]    Basal cell carcinoma of skin of upper extremity, including shoulder     BCC (basal cell carcinoma), trunk     BCE (basal cell epithelioma), trunk     RESOLVED: [id number]    Benign neoplasm of skin of face     Coronary artery disease involving native coronary artery of native heart without angina pectoris 10/16/2018    Depression 10/6/2015    Gastroesophageal reflux disease without esophagitis 4/21/2017    Hypertension     Neoplasm of uncertain behavior of skin     Nonmelanoma skin cancer     LAST ASSESSED: [id number]    Seborrheic keratosis     RESOLVED: [id number]    Stroke ([facility] [address] )          Current Outpatient Medications:     allopurinol (ZYLOPRIM) 100 mg tablet, Take 1 tablet (100 mg total) by mouth daily, Disp: 90 tablet, Rfl: 3    carvedilol (COREG) 25 mg tablet, Take 1 tablet (25 mg total) by mouth 2 (two) times a day with meals, Disp: 180 tablet, Rfl: 1    clopidogrel (PLAVIX) 75 mg tablet, Take 1 tablet (75 mg total) by mouth daily, Disp: 90 tablet, Rfl: 1    ergocalciferol (VITAMIN D2) 50,000 units, Take 1 capsule (50,000 Units total) by mouth once a week, Disp: 12 capsule, Rfl: 0    losartan (COZAAR) 50 mg tablet, TAKE ONE TABLET BY MOUTH ONE TIME DAILY , Disp: 90 tablet, Rfl: 1    rosuvastatin (CRESTOR) 10 MG tablet, Take 1 tablet (10 mg total) by mouth daily at bedtime, Disp: 90 tablet, Rfl: 1    benzonatate (TESSALON PERLES) 100 mg capsule, , Disp: , Rfl:     colchicine (COLCRYS) 0 6 mg tablet, Take 1 tablet (0 6 mg total) by mouth daily, Disp: 30 tablet, Rfl: 1    fluticasone (FLONASE) 50 mcg/act nasal spray, , Disp: , Rfl:     Multiple Vitamins-Minerals (MULTIVITAMIN ADULT PO), Take 1 tablet by mouth daily (Patient not taking: Reported on 7/27/2021), Disp: , Rfl:     predniSONE 20 mg tablet, Take 1 tablet (20 mg total) by mouth 2 (two) times a day with meals for 5 days, Disp: 10 tablet, Rfl: 0    Allergies   Allergen Reactions    Other      [place] trees       Social History   Past Surgical History:   Procedure Laterality Date    ANAL FISTULOTOMY  06/13/2011    DR SANCHEZ    CORONARY ARTERY BYPASS GRAFT  05/17/2005    TRIPLE; DR Weiner [facility]  10/2009    DR Tenorio [facility] SURGERY  06/18/2015    JOINT REPLACEMENT; DR Hector Reilly     Family History   Problem Relation Age of Onset    Other Mother         BRAIN TUMOR    No Known Problems Father     Vascular Disease Brother     Cancer Brother        Objective:  /72 (BP Location: Left arm, Patient Position: Sitting, Cuff Size: Standard)   Pulse 64   Temp 98 1 °F (36 7 °C) (Temporal)   Ht 5' 2 5" (1 588 m)   Wt 83 8 kg (184 lb 11 2 oz)   SpO2 95%   BMI 33 24 kg/m²   Body mass index is 33 24 kg/m²  Physical Exam  Constitutional:       Appearance: He is well-developed  HENT:      Head: Normocephalic  Left Ear: There is no impacted cerumen  Mouth/Throat:      Pharynx: No posterior oropharyngeal erythema  Eyes:      General: No scleral icterus  Pupils: Pupils are equal, round, and reactive to light  Neck:      Thyroid: No thyromegaly  Trachea: No tracheal deviation  Cardiovascular:      Rate and Rhythm: Normal rate and regular rhythm  Heart sounds: Normal heart sounds  Pulmonary:      Effort: Pulmonary effort is normal  No respiratory distress  Breath sounds: Normal breath sounds  Chest:      Chest wall: No tenderness  Abdominal:      General: Bowel sounds are normal       Palpations: Abdomen is soft  There is no mass  Tenderness: There is no abdominal tenderness  Musculoskeletal:         General: Normal range of motion  Cervical back: Normal range of motion and neck supple  Right lower leg: Edema present  Lymphadenopathy:      Cervical: No cervical adenopathy  Skin:     General: Skin is warm     Neurological: Mental Status: He is alert and oriented to person, place, and time  Cranial Nerves: No cranial nerve deficit     Psychiatric:         Mood and Affect: Mood normal          Behavior: Behavior normal

## 2021-10-05 LAB
BUN SERPL-MCNC: 17 MG/DL (ref 8–27)
BUN/CREAT SERPL: 14 (ref 10–24)
CALCIUM SERPL-MCNC: 9.2 MG/DL (ref 8.6–10.2)
CHLORIDE SERPL-SCNC: 107 MMOL/L (ref 96–106)
CO2 SERPL-SCNC: 21 MMOL/L (ref 20–29)
CREAT SERPL-MCNC: 1.24 MG/DL (ref 0.76–1.27)
GLUCOSE SERPL-MCNC: 114 MG/DL (ref 65–99)
HBA1C MFR BLD: 6.3 % (ref 4.8–5.6)
POTASSIUM SERPL-SCNC: 5 MMOL/L (ref 3.5–5.2)
SL AMB EGFR AFRICAN AMERICAN: 64 ML/MIN/1.73
SL AMB EGFR NON AFRICAN AMERICAN: 55 ML/MIN/1.73
SODIUM SERPL-SCNC: 142 MMOL/L (ref 134–144)
URATE SERPL-MCNC: 8.1 MG/DL (ref 3.8–8.4)

## 2021-10-11 ENCOUNTER — RA CDI HCC (OUTPATIENT)
Dept: OTHER | Facility: HOSPITAL | Age: 76
End: 2021-10-11

## 2021-10-15 ENCOUNTER — OFFICE VISIT (OUTPATIENT)
Dept: INTERNAL MEDICINE CLINIC | Age: 76
End: 2021-10-15
Payer: COMMERCIAL

## 2021-10-15 VITALS
DIASTOLIC BLOOD PRESSURE: 64 MMHG | TEMPERATURE: 98 F | BODY MASS INDEX: 32.43 KG/M2 | WEIGHT: 183 LBS | HEART RATE: 55 BPM | SYSTOLIC BLOOD PRESSURE: 114 MMHG | HEIGHT: 63 IN | OXYGEN SATURATION: 94 %

## 2021-10-15 DIAGNOSIS — N18.31 STAGE 3A CHRONIC KIDNEY DISEASE (HCC): ICD-10-CM

## 2021-10-15 DIAGNOSIS — E11.22 TYPE 2 DIABETES MELLITUS WITH STAGE 3A CHRONIC KIDNEY DISEASE, WITHOUT LONG-TERM CURRENT USE OF INSULIN (HCC): Primary | ICD-10-CM

## 2021-10-15 DIAGNOSIS — N18.31 TYPE 2 DIABETES MELLITUS WITH STAGE 3A CHRONIC KIDNEY DISEASE, WITHOUT LONG-TERM CURRENT USE OF INSULIN (HCC): Primary | ICD-10-CM

## 2021-10-15 DIAGNOSIS — I10 BENIGN ESSENTIAL HTN: ICD-10-CM

## 2021-10-15 DIAGNOSIS — I25.10 CORONARY ARTERY DISEASE INVOLVING NATIVE CORONARY ARTERY OF NATIVE HEART WITHOUT ANGINA PECTORIS: ICD-10-CM

## 2021-10-15 PROCEDURE — 3074F SYST BP LT 130 MM HG: CPT | Performed by: INTERNAL MEDICINE

## 2021-10-15 PROCEDURE — 1160F RVW MEDS BY RX/DR IN RCRD: CPT | Performed by: INTERNAL MEDICINE

## 2021-10-15 PROCEDURE — 3078F DIAST BP <80 MM HG: CPT | Performed by: INTERNAL MEDICINE

## 2021-10-15 PROCEDURE — 4004F PT TOBACCO SCREEN RCVD TLK: CPT | Performed by: INTERNAL MEDICINE

## 2021-10-15 PROCEDURE — 99214 OFFICE O/P EST MOD 30 MIN: CPT | Performed by: INTERNAL MEDICINE

## 2021-10-18 ENCOUNTER — TELEPHONE (OUTPATIENT)
Dept: ADMINISTRATIVE | Facility: OTHER | Age: 76
End: 2021-10-18

## 2021-11-20 DIAGNOSIS — I63.9 CEREBELLAR STROKE (HCC): ICD-10-CM

## 2021-11-22 RX ORDER — CLOPIDOGREL BISULFATE 75 MG/1
75 TABLET ORAL DAILY
Qty: 90 TABLET | Refills: 1 | Status: SHIPPED | OUTPATIENT
Start: 2021-11-22 | End: 2022-07-06 | Stop reason: SDUPTHER

## 2021-12-08 ENCOUNTER — OFFICE VISIT (OUTPATIENT)
Dept: CARDIOLOGY CLINIC | Facility: CLINIC | Age: 76
End: 2021-12-08
Payer: COMMERCIAL

## 2021-12-08 VITALS
HEART RATE: 51 BPM | WEIGHT: 186.8 LBS | BODY MASS INDEX: 33.1 KG/M2 | DIASTOLIC BLOOD PRESSURE: 70 MMHG | HEIGHT: 63 IN | SYSTOLIC BLOOD PRESSURE: 126 MMHG | OXYGEN SATURATION: 96 %

## 2021-12-08 DIAGNOSIS — I25.10 CORONARY ARTERY DISEASE INVOLVING NATIVE CORONARY ARTERY OF NATIVE HEART WITHOUT ANGINA PECTORIS: Primary | ICD-10-CM

## 2021-12-08 DIAGNOSIS — E78.00 HYPERCHOLESTEROLEMIA: ICD-10-CM

## 2021-12-08 DIAGNOSIS — I10 BENIGN ESSENTIAL HTN: ICD-10-CM

## 2021-12-08 DIAGNOSIS — I65.23 CAROTID ARTERY STENOSIS, ASYMPTOMATIC, BILATERAL: ICD-10-CM

## 2021-12-08 PROCEDURE — 93000 ELECTROCARDIOGRAM COMPLETE: CPT | Performed by: INTERNAL MEDICINE

## 2021-12-08 PROCEDURE — 1160F RVW MEDS BY RX/DR IN RCRD: CPT | Performed by: INTERNAL MEDICINE

## 2021-12-08 PROCEDURE — 4004F PT TOBACCO SCREEN RCVD TLK: CPT | Performed by: INTERNAL MEDICINE

## 2021-12-08 PROCEDURE — 99214 OFFICE O/P EST MOD 30 MIN: CPT | Performed by: INTERNAL MEDICINE

## 2021-12-08 PROCEDURE — 3078F DIAST BP <80 MM HG: CPT | Performed by: INTERNAL MEDICINE

## 2021-12-08 PROCEDURE — 3074F SYST BP LT 130 MM HG: CPT | Performed by: INTERNAL MEDICINE

## 2021-12-08 RX ORDER — MELATONIN
1000 DAILY
COMMUNITY

## 2021-12-23 DIAGNOSIS — E78.00 HYPERCHOLESTEROLEMIA: ICD-10-CM

## 2021-12-23 RX ORDER — CARVEDILOL 25 MG/1
25 TABLET ORAL 2 TIMES DAILY WITH MEALS
Qty: 180 TABLET | Refills: 1 | Status: SHIPPED | OUTPATIENT
Start: 2021-12-23

## 2022-01-03 ENCOUNTER — TELEPHONE (OUTPATIENT)
Dept: INTERNAL MEDICINE CLINIC | Facility: CLINIC | Age: 77
End: 2022-01-03

## 2022-01-03 NOTE — TELEPHONE ENCOUNTER
Patient has had nasal congestion for about a week  Has been feeling better but noticed discharged is yellow/green in color  Has been using a saline nasal flush and noticed that does help but at the end of the day and in the morning when he wakes up he noticed he is congested  Patient is leaving in a week and is asking if Dr Jozef Lundberg would prescribe and antibiotic  Patient states he has chronic sinusitis and usually requires antibiotic  Patient was informed he may need an appointment but would like to ask Dr Jozef Lundberg first if a prescription can be given without an appointment  Please advise

## 2022-01-04 ENCOUNTER — TELEMEDICINE (OUTPATIENT)
Dept: INTERNAL MEDICINE CLINIC | Facility: CLINIC | Age: 77
End: 2022-01-04
Payer: COMMERCIAL

## 2022-01-04 VITALS — TEMPERATURE: 97.6 F

## 2022-01-04 DIAGNOSIS — M10.9 ACUTE GOUTY ARTHRITIS: ICD-10-CM

## 2022-01-04 DIAGNOSIS — J01.00 ACUTE NON-RECURRENT MAXILLARY SINUSITIS: Primary | ICD-10-CM

## 2022-01-04 PROCEDURE — 99441 PR PHYS/QHP TELEPHONE EVALUATION 5-10 MIN: CPT | Performed by: INTERNAL MEDICINE

## 2022-01-04 RX ORDER — AMOXICILLIN 875 MG/1
875 TABLET, COATED ORAL 2 TIMES DAILY
Qty: 20 TABLET | Refills: 0 | Status: SHIPPED | OUTPATIENT
Start: 2022-01-04 | End: 2022-01-14

## 2022-01-04 NOTE — PROGRESS NOTES
Virtual Brief Visit    Patient is located in the following state in which I hold an active license [place]      Assessment/Plan:    Problem List Items Addressed This Visit        Respiratory    Acute non-recurrent maxillary sinusitis - Primary     Assessment:      Sinusitis     Plan:      1  I recommend a trial of  decongestant, saline lavage  Amoxicillin if no relief   2  The risks and benefits of my recommendations, as well as other treatment options were discussed with the patient today  3  Return for follow-up as needed  Musculoskeletal and Integument    Acute gouty arthritis        Sinusitis  This is a new problem  The current episode started in the past 7 days  The problem has been gradually improving since onset  The maximum temperature recorded prior to his arrival was 100 4 - 100 9 F  The pain is mild  Associated symptoms include congestion, coughing and sinus pressure  Pertinent negatives include no chills, ear pain, shortness of breath or swollen glands  Past treatments include acetaminophen and oral decongestants  The treatment provided moderate relief  Review of Systems   Constitutional: Positive for activity change and fever  Negative for chills  HENT: Positive for congestion, nosebleeds, postnasal drip and sinus pressure  Negative for ear pain and trouble swallowing  Respiratory: Positive for cough  Negative for shortness of breath  Cardiovascular: Negative for chest pain and palpitations  Musculoskeletal: Negative for arthralgias  Neurological: Negative for dizziness and light-headedness  All other systems reviewed and are negative          Recent Visits  Date Type Provider Dept   01/03/22 Telephone Stella Jones [facility]   Showing recent visits within past 7 days and meeting all other requirements  Today's Visits  Date Type Provider Dept   01/04/22 Telemedicine Katelynn Lepe, [address] today's visits and meeting all other requirements  Future Appointments  No visits were found meeting these conditions    Showing future appointments within next 150 days and meeting all other requirements             I spent 9 minutes directly with the patient during this visit

## 2022-01-04 NOTE — PROGRESS NOTES
COVID-19 Outpatient Progress Note    Assessment/Plan:    Problem List Items Addressed This Visit     None         COVID-19 Plan   Encounter provider Aly Moyer MD    Provider located at [address] [address]    Recent Visits  Date Type Provider Dept   01/03/22 Telephone Tanja Lo Deandre [facility]   Showing recent visits within past 7 days and meeting all other requirements  Future Appointments  No visits were found meeting these conditions  Showing future appointments within next 150 days and meeting all other requirements     COVID-19 HPI  Lab Results   Component Value Date    SARSCOV2 Negative 07/13/2021     Past Medical History:   Diagnosis Date    Actinic keratosis     RESOLVED: [id number]    Adolescent idiopathic scoliosis of thoracolumbar region 4/12/2018    Basal cell carcinoma of back     RESOLVED: [id number]    Basal cell carcinoma of skin of upper extremity, including shoulder     BCC (basal cell carcinoma), trunk     BCE (basal cell epithelioma), trunk     RESOLVED: [id number]    Benign neoplasm of skin of face     Coronary artery disease involving native coronary artery of native heart without angina pectoris 10/16/2018    Depression 10/6/2015    Gastroesophageal reflux disease without esophagitis 4/21/2017    Hypertension     Neoplasm of uncertain behavior of skin     Nonmelanoma skin cancer     LAST ASSESSED: [id number]    Seborrheic keratosis     RESOLVED: [id number]    Stroke [facility])      Past Surgical History:   Procedure Laterality Date    ANAL FISTULOTOMY  06/13/2011    DR SANCHEZ    CORONARY ARTERY BYPASS GRAFT  05/17/2005    TRIPLE;   [address] [place] [address] SURGERY  10/2009       [address] [place] [address] SURGERY  06/18/2015    JOINT REPLACEMENT; DR Alondra Bradley     Current Outpatient Medications   Medication Sig Dispense Refill    allopurinol (ZYLOPRIM) 100 mg tablet Take 1 tablet (100 mg total) by mouth daily (Patient taking differently: Take 100 mg by mouth as needed  ) 90 tablet 3    benzonatate (TESSALON PERLES) 100 mg capsule  (Patient not taking: Reported on 7/27/2021 )      carvedilol (COREG) 25 mg tablet Take 1 tablet (25 mg total) by mouth 2 (two) times a day with meals 180 tablet 1    cholecalciferol (VITAMIN D3) 1,000 units tablet Take 1,000 Units by mouth daily      clopidogrel (PLAVIX) 75 mg tablet Take 1 tablet (75 mg total) by mouth daily 90 tablet 1    colchicine (COLCRYS) 0 6 mg tablet Take 1 tablet (0 6 mg total) by mouth daily 30 tablet 1    ergocalciferol (VITAMIN D2) 50,000 units Take 1 capsule (50,000 Units total) by mouth once a week 12 capsule 0    fluticasone (FLONASE) 50 mcg/act nasal spray  (Patient not taking: Reported on 7/27/2021 )      losartan (COZAAR) 50 mg tablet TAKE ONE TABLET BY MOUTH ONE TIME DAILY  90 tablet 1    Multiple Vitamins-Minerals (MULTIVITAMIN ADULT PO) Take 1 tablet by mouth daily (Patient not taking: Reported on 7/27/2021)      rosuvastatin (CRESTOR) 10 MG tablet Take 1 tablet (10 mg total) by mouth daily at bedtime 90 tablet 1     No current facility-administered medications for this visit  Allergies   Allergen Reactions    Other      [place] trees       Review of Systems  Objective: There were no vitals filed for this visit  Physical Exam    VIRTUAL VISIT DISCLAIMER    Anjelica Parrish verbally agrees to participate in [employer]  Pt is aware that [employer] could be limited without vital signs or the ability to perform a full hands-on physical [place] understands he or the provider may request at any time to terminate the video visit and request the patient to seek care or treatment in person

## 2022-01-04 NOTE — ASSESSMENT & PLAN NOTE
Assessment:      Sinusitis     Plan:      1  I recommend a trial of  decongestant, saline lavage  Amoxicillin if no relief   2  The risks and benefits of my recommendations, as well as other treatment options were discussed with the patient today  3  Return for follow-up as needed

## 2022-01-05 ENCOUNTER — HOSPITAL ENCOUNTER (OUTPATIENT)
Dept: NON INVASIVE DIAGNOSTICS | Facility: HOSPITAL | Age: 77
Discharge: HOME | End: 2022-01-05
Payer: COMMERCIAL

## 2022-01-05 VITALS
DIASTOLIC BLOOD PRESSURE: 70 MMHG | SYSTOLIC BLOOD PRESSURE: 126 MMHG | BODY MASS INDEX: 32.96 KG/M2 | HEART RATE: 59 BPM | HEIGHT: 63 IN | WEIGHT: 186 LBS

## 2022-01-05 DIAGNOSIS — E78.00 HYPERCHOLESTEROLEMIA: ICD-10-CM

## 2022-01-05 DIAGNOSIS — I10 BENIGN ESSENTIAL HTN: ICD-10-CM

## 2022-01-05 DIAGNOSIS — I65.23 CAROTID ARTERY STENOSIS, ASYMPTOMATIC, BILATERAL: ICD-10-CM

## 2022-01-05 DIAGNOSIS — I25.10 CORONARY ARTERY DISEASE INVOLVING NATIVE CORONARY ARTERY OF NATIVE HEART WITHOUT ANGINA PECTORIS: ICD-10-CM

## 2022-01-05 LAB
AORTIC ROOT: 3.3 CM
APICAL FOUR CHAMBER EJECTION FRACTION: 47 %
ASCENDING AORTA: 3.3 CM
E WAVE DECELERATION TIME: 199 MS
FRACTIONAL SHORTENING: 24 % (ref 28–44)
INTERVENTRICULAR SEPTUM IN DIASTOLE (PARASTERNAL SHORT AXIS VIEW): 1.2 CM
LEFT ATRIUM AREA SYSTOLE SINGLE PLANE A4C: 27.4 CM2
LEFT INTERNAL DIMENSION IN SYSTOLE: 2.5 CM (ref 2.1–4)
LEFT VENTRICLE DIASTOLIC VOLUME (MOD BIPLANE): 173 ML
LEFT VENTRICLE SYSTOLIC VOLUME (MOD BIPLANE): 93 ML
LEFT VENTRICULAR INTERNAL DIMENSION IN DIASTOLE: 3.3 CM (ref 4.64–6.91)
LEFT VENTRICULAR POSTERIOR WALL IN END DIASTOLE: 1.1 CM
LEFT VENTRICULAR STROKE VOLUME: 21 ML
LV EF: 46 %
MV E'TISSUE VEL-SEP: 6 CM/S
MV PEAK A VEL: 0.7 M/S
MV PEAK E VEL: 85 CM/S
MV STENOSIS PRESSURE HALF TIME: 0 MS
RIGHT ATRIUM AREA SYSTOLE A4C: 21.8 CM2
RIGHT VENTRICLE ID DIMENSION: 4.5 CM
RV PSP: 26 MMHG
SL CV LV EF: 45
SL CV PED ECHO LEFT VENTRICLE DIASTOLIC VOLUME (MOD BIPLANE) 2D: 43 ML
SL CV PED ECHO LEFT VENTRICLE SYSTOLIC VOLUME (MOD BIPLANE) 2D: 22 ML
TRICUSPID VALVE PEAK REGURGITATION VELOCITY: 2.27 M/S
TRICUSPID VALVE S': 0.9 CM/S
TV PEAK GRADIENT: 21 MMHG
Z-SCORE OF LEFT VENTRICULAR DIMENSION IN END SYSTOLE: -5.4

## 2022-01-05 PROCEDURE — 93306 TTE W/DOPPLER COMPLETE: CPT

## 2022-01-05 PROCEDURE — 93306 TTE W/DOPPLER COMPLETE: CPT | Performed by: INTERNAL MEDICINE

## 2022-02-08 ENCOUNTER — RA CDI HCC (OUTPATIENT)
Dept: OTHER | Facility: HOSPITAL | Age: 77
End: 2022-02-08

## 2022-02-08 NOTE — PROGRESS NOTES
[facility]  coding opportunities       Chart reviewed, no opportunity found: CHART REVIEWED, NO OPPORTUNITY FOUND                        Patients insurance company: [employer] only)

## 2022-02-11 LAB
BUN SERPL-MCNC: 19 MG/DL (ref 8–27)
BUN/CREAT SERPL: 16 (ref 10–24)
CALCIUM SERPL-MCNC: 9.3 MG/DL (ref 8.6–10.2)
CHLORIDE SERPL-SCNC: 105 MMOL/L (ref 96–106)
CO2 SERPL-SCNC: 19 MMOL/L (ref 20–29)
CREAT SERPL-MCNC: 1.21 MG/DL (ref 0.76–1.27)
GLUCOSE SERPL-MCNC: 137 MG/DL (ref 65–99)
HBA1C MFR BLD: 6.1 % (ref 4.8–5.6)
POTASSIUM SERPL-SCNC: 5.2 MMOL/L (ref 3.5–5.2)
SL AMB EGFR AFRICAN AMERICAN: 66 ML/MIN/1.73
SL AMB EGFR NON AFRICAN AMERICAN: 57 ML/MIN/1.73
SODIUM SERPL-SCNC: 140 MMOL/L (ref 134–144)

## 2022-02-15 ENCOUNTER — OFFICE VISIT (OUTPATIENT)
Dept: INTERNAL MEDICINE CLINIC | Age: 77
End: 2022-02-15
Payer: COMMERCIAL

## 2022-02-15 VITALS
HEIGHT: 63 IN | DIASTOLIC BLOOD PRESSURE: 66 MMHG | TEMPERATURE: 98.8 F | OXYGEN SATURATION: 96 % | SYSTOLIC BLOOD PRESSURE: 102 MMHG | WEIGHT: 186.9 LBS | HEART RATE: 54 BPM | BODY MASS INDEX: 33.12 KG/M2

## 2022-02-15 DIAGNOSIS — E66.01 OBESITY, MORBID (HCC): ICD-10-CM

## 2022-02-15 DIAGNOSIS — N18.31 STAGE 3A CHRONIC KIDNEY DISEASE (HCC): ICD-10-CM

## 2022-02-15 DIAGNOSIS — E78.00 HYPERCHOLESTEROLEMIA: ICD-10-CM

## 2022-02-15 DIAGNOSIS — E11.22 TYPE 2 DIABETES MELLITUS WITH STAGE 3A CHRONIC KIDNEY DISEASE, WITHOUT LONG-TERM CURRENT USE OF INSULIN (HCC): Primary | ICD-10-CM

## 2022-02-15 DIAGNOSIS — R79.89 LOW VITAMIN D LEVEL: ICD-10-CM

## 2022-02-15 DIAGNOSIS — I10 BENIGN ESSENTIAL HTN: ICD-10-CM

## 2022-02-15 DIAGNOSIS — I25.10 CORONARY ARTERY DISEASE INVOLVING NATIVE CORONARY ARTERY OF NATIVE HEART WITHOUT ANGINA PECTORIS: ICD-10-CM

## 2022-02-15 DIAGNOSIS — N18.31 TYPE 2 DIABETES MELLITUS WITH STAGE 3A CHRONIC KIDNEY DISEASE, WITHOUT LONG-TERM CURRENT USE OF INSULIN (HCC): Primary | ICD-10-CM

## 2022-02-15 PROCEDURE — 3078F DIAST BP <80 MM HG: CPT | Performed by: INTERNAL MEDICINE

## 2022-02-15 PROCEDURE — 1160F RVW MEDS BY RX/DR IN RCRD: CPT | Performed by: INTERNAL MEDICINE

## 2022-02-15 PROCEDURE — 4004F PT TOBACCO SCREEN RCVD TLK: CPT | Performed by: INTERNAL MEDICINE

## 2022-02-15 PROCEDURE — 99214 OFFICE O/P EST MOD 30 MIN: CPT | Performed by: INTERNAL MEDICINE

## 2022-02-15 PROCEDURE — 3074F SYST BP LT 130 MM HG: CPT | Performed by: INTERNAL MEDICINE

## 2022-02-15 PROCEDURE — 1101F PT FALLS ASSESS-DOCD LE1/YR: CPT | Performed by: INTERNAL MEDICINE

## 2022-02-15 PROCEDURE — 3288F FALL RISK ASSESSMENT DOCD: CPT | Performed by: INTERNAL MEDICINE

## 2022-02-15 NOTE — PATIENT INSTRUCTIONS
10% - bad control"> 10% - bad control,Hemoglobin A1c (HbA1c) greater than 10% indicating poor diabetic control,Haemoglobin A1c greater than 10% indicating poor diabetic control">   Diabetes Mellitus Type 2 in Adults, Ambulatory Care   GENERAL INFORMATION:   Diabetes mellitus type 2  is a disease that affects how your body uses glucose (sugar)  Insulin helps move sugar out of the blood so it can be used for energy  Normally, when the blood sugar level increases, the pancreas makes more insulin  Type 2 diabetes develops because either the body cannot make enough insulin, or it cannot use the insulin correctly  After many years, your pancreas may stop making insulin  Common symptoms include the following:   · More hunger or thirst than usual     · Frequent urination     · Weight loss without trying     · Blurred vision  Seek immediate care for the following symptoms:   · Severe abdominal pain, or pain that spreads to your back  You may also be vomiting  · Trouble staying awake or focusing    · Shaking or sweating    · Blurred or double vision    · Breath has a fruity, sweet smell    · Breathing is deep and labored, or rapid and shallow    · Heartbeat is fast and weak  Treatment for diabetes mellitus type 2  includes keeping your blood sugar at a normal level  You must eat the right foods, and exercise regularly  You may also need medicine if you cannot control your blood sugar level with nutrition and exercise  Manage diabetes mellitus type 2:   · Check your blood sugar level  You will be taught how to check a small drop of blood in a glucose monitor  Ask your healthcare provider when and how often to check during the day  Ask your healthcare provider what your blood sugar levels should be when you check them  · Keep track of carbohydrates (sugar and starchy foods)  Your blood sugar level can get too high if you eat too many carbohydrates   Your dietitian will help you plan meals and snacks that have the right amount of carbohydrates  · Eat low-fat foods  Some examples are skinless chicken and low-fat milk  · Eat less sodium (salt)  Some examples of high-sodium foods to limit are soy sauce, potato chips, and soup  Do not add salt to food you cook  Limit your use of table salt  · Eat high-fiber foods  Foods that are a good source of fiber include vegetables, whole grain bread, and beans  · Limit alcohol  Alcohol affects your blood sugar level and can make it harder to manage your diabetes  Women should limit alcohol to 1 drink a day  Men should limit alcohol to 2 drinks a day  A drink of alcohol is 12 ounces of beer, 5 ounces of wine, or 1½ ounces of liquor  · Get regular exercise  Exercise can help keep your blood sugar level steady, decrease your risk of heart disease, and help you lose weight  Exercise for at least 30 minutes, 5 days a week  Include muscle strengthening activities 2 days each week  Work with your healthcare provider to create an exercise plan  · Check your feet each day  for injuries or open sores  Ask your healthcare provider for activities you can do if you have an open sore  · Quit smoking  If you smoke, it is never too late to quit  Smoking can worsen the problems that may occur with diabetes  Ask your healthcare provider for information about how to stop smoking if you are having trouble quitting  · Ask about your weight:  Ask healthcare providers if you need to lose weight, and how much to lose  Ask them to help you with a weight loss program  Even a 10 to 15 pound weight loss can help you manage your blood sugar level  · Carry medical alert identification  Wear medical alert jewelry or carry a card that says you have diabetes  Ask your healthcare provider where to get these items  · Ask about vaccines  Diabetes puts you at risk of serious illness if you get the flu, pneumonia, or hepatitis   Ask your healthcare provider if you should get a flu, pneumonia, or hepatitis B vaccine, and when to get the vaccine  Follow up with your healthcare provider as directed:  Write down your questions so you remember to ask them during your visits  CARE AGREEMENT:   You have the right to help plan your care  Learn about your health condition and how it may be treated  Discuss treatment options with your caregivers to decide what care you want to receive  You always have the right to refuse treatment  The above information is an  only  It is not intended as medical advice for individual conditions or treatments  Talk to your doctor, nurse or pharmacist before following any medical regimen to see if it is safe and effective for you  © 2014 [address] is for End User's use only and may not be sold, redistributed or otherwise used for commercial purposes  All illustrations and images included in CareNotes® are the copyrighted property of [employer] D [employer] M [employer]  or Thaddeus Romero

## 2022-02-15 NOTE — PROGRESS NOTES
Assessment/Plan:    1  Type 2 diabetes mellitus  Hemoglobin A1c 6 1  Which is better than before  Continue with present diet and exercise  Will continue to monitor hemoglobin A1c    2  Essential hypertension  Blood pressure is stable on present regimen    3  Hyperlipidemia  Continue with Crestor 10 mg daily  Will check lipid profile before next visit    4  CKD stage 3  Renal function stable  Will continue to monitor    5  Vitamin-D deficiency  Continue with vitamin D3 supplementation     Diagnoses and all orders for this visit:    Type 2 diabetes mellitus with stage 3a chronic kidney disease, without long-term current use of insulin ([facility])  -     Hemoglobin A1C; Future    Benign essential HTN  -     CBC; Future    Coronary artery disease involving native coronary artery of native heart without angina pectoris    Stage 3a chronic kidney disease ([facility] )  -     Basic metabolic panel; Future    Low vitamin D level    Hypercholesterolemia  -     Lipid Panel with Direct LDL reflex; Future    Obesity, morbid ([facility] )          BMI Counseling: Body mass index is 33 11 kg/m²  The BMI is above normal  Nutrition recommendations include decreasing portion sizes, encouraging healthy choices of fruits and vegetables, decreasing fast food intake, consuming healthier snacks and limiting drinks that contain sugar  Exercise recommendations include moderate physical activity 150 minutes/week and exercising 3-5 times per week  No pharmacotherapy was ordered  Rationale for BMI follow-up plan is due to patient being overweight or obese  Subjective:          Patient ID: Mick Marsh is a 64 y o  male  HPI    The following portions of the patient's history were reviewed and updated as appropriate: allergies, current medications, past family history, past medical history, past social history, past surgical history and problem list     Review of Systems   Constitutional: Negative for fatigue and fever     HENT: Negative for congestion, ear discharge, ear pain, postnasal drip, sinus pressure, sore throat, tinnitus and trouble swallowing  Eyes: Negative for discharge, itching and visual disturbance  Respiratory: Negative for cough and shortness of breath  Cardiovascular: Negative for chest pain and palpitations  Gastrointestinal: Negative for abdominal pain, diarrhea, nausea and vomiting  Endocrine: Negative for cold intolerance and polyuria  Genitourinary: Negative for difficulty urinating, dysuria and urgency  Musculoskeletal: Negative for arthralgias and neck pain  Skin: Negative for rash  Allergic/Immunologic: Negative for environmental allergies  Neurological: Negative for dizziness, weakness and headaches  Psychiatric/Behavioral: Negative for agitation, behavioral problems and confusion  The patient is not nervous/anxious            Past Medical History:   Diagnosis Date    Actinic keratosis     RESOLVED: [id number]    Adolescent idiopathic scoliosis of thoracolumbar region 4/12/2018    Basal cell carcinoma of back     RESOLVED: [id number]    Basal cell carcinoma of skin of upper extremity, including shoulder     BCC (basal cell carcinoma), trunk     BCE (basal cell epithelioma), trunk     RESOLVED: [id number]    Benign neoplasm of skin of face     Coronary artery disease involving native coronary artery of native heart without angina pectoris 10/16/2018    Depression 10/6/2015    Gastroesophageal reflux disease without esophagitis 4/21/2017    Hypertension     Neoplasm of uncertain behavior of skin     Nonmelanoma skin cancer     LAST ASSESSED: [id number]    Seborrheic keratosis     RESOLVED: [id number]    [facility]          Current Outpatient Medications:     allopurinol (ZYLOPRIM) 100 mg tablet, Take 1 tablet (100 mg total) by mouth daily (Patient taking differently: Take 100 mg by mouth as needed  ), Disp: 90 tablet, Rfl: 3    carvedilol (COREG) 25 mg tablet, Take 1 tablet (25 mg total) by mouth 2 (two) times a day with meals, Disp: 180 tablet, Rfl: 1    cholecalciferol (VITAMIN D3) 1,000 units tablet, Take 1,000 Units by mouth daily, Disp: , Rfl:     clopidogrel (PLAVIX) 75 mg tablet, Take 1 tablet (75 mg total) by mouth daily, Disp: 90 tablet, Rfl: 1    colchicine (COLCRYS) 0 6 mg tablet, Take 1 tablet (0 6 mg total) by mouth daily (Patient taking differently: Take 0 6 mg by mouth daily as needed  ), Disp: 30 tablet, Rfl: 1    ergocalciferol (VITAMIN D2) 50,000 units, Take 1 capsule (50,000 Units total) by mouth once a week, Disp: 12 capsule, Rfl: 0    losartan (COZAAR) 50 mg tablet, TAKE ONE TABLET BY MOUTH ONE TIME DAILY , Disp: 90 tablet, Rfl: 1    rosuvastatin (CRESTOR) 10 MG tablet, Take 1 tablet (10 mg total) by mouth daily at bedtime, Disp: 90 tablet, Rfl: 1    Allergies   Allergen Reactions    Other      [place]       Social History   Past Surgical History:   Procedure Laterality Date    ANAL FISTULOTOMY  06/13/2011    DR SANCHEZ    CORONARY ARTERY BYPASS GRAFT  05/17/2005    TRIPLE; DR Weiner [facility] SURGERY  10/2009      [address] [place] [address] SURGERY  06/18/2015    JOINT REPLACEMENT; DR Flavia Samaniego     Family History   Problem Relation Age of Onset    Other Mother         BRAIN TUMOR    No Known Problems Father     Vascular Disease Brother     Cancer Brother        Objective:  /66 (BP Location: Left arm, Patient Position: Sitting, Cuff Size: Standard)   Pulse (!) 54   Temp 98 8 °F (37 1 °C) (Temporal)   Ht 5' 3" (1 6 m)   Wt 84 8 kg (186 lb 14 4 oz)   SpO2 96%   BMI 33 11 kg/m²   Body mass index is 33 11 kg/m²  Physical Exam  Constitutional:       Appearance: He is well-developed  HENT:      Head: Normocephalic  Right Ear: External ear normal       Left Ear: External ear normal       Nose: No rhinorrhea  Eyes:      General: No scleral icterus  Pupils: Pupils are equal, round, and reactive to light     Neck: Thyroid: No thyromegaly  Trachea: No tracheal deviation  Cardiovascular:      Rate and Rhythm: Normal rate and regular rhythm  Heart sounds: Normal heart sounds  No murmur heard  Pulmonary:      Effort: Pulmonary effort is normal  No respiratory distress  Breath sounds: Normal breath sounds  Chest:      Chest wall: No tenderness  Abdominal:      General: Bowel sounds are normal       Palpations: Abdomen is soft  There is no mass  Tenderness: There is no abdominal tenderness  Musculoskeletal:         General: Normal range of motion  Cervical back: Normal range of motion and neck supple  Right lower leg: No edema  Left lower leg: No edema  Lymphadenopathy:      Cervical: No cervical adenopathy  Skin:     General: Skin is warm  Findings: No erythema or rash  Neurological:      Mental Status: He is alert and oriented to person, place, and time  Cranial Nerves: No cranial nerve deficit  Psychiatric:         Mood and Affect: Mood normal          Behavior: Behavior normal          Thought Content:  Thought content normal

## 2022-03-18 LAB
LEFT EYE DIABETIC RETINOPATHY: NORMAL
RIGHT EYE DIABETIC RETINOPATHY: NORMAL
SEVERITY (EYE EXAM): NORMAL

## 2022-06-24 LAB
BASOPHILS # BLD AUTO: 0 X10E3/UL (ref 0–0.2)
BASOPHILS NFR BLD AUTO: 0 %
BUN SERPL-MCNC: 19 MG/DL (ref 8–27)
BUN/CREAT SERPL: 13 (ref 10–24)
CALCIUM SERPL-MCNC: 9.4 MG/DL (ref 8.6–10.2)
CHLORIDE SERPL-SCNC: 104 MMOL/L (ref 96–106)
CHOLEST SERPL-MCNC: 112 MG/DL (ref 100–199)
CO2 SERPL-SCNC: 23 MMOL/L (ref 20–29)
CREAT SERPL-MCNC: 1.41 MG/DL (ref 0.76–1.27)
EGFR: 51 ML/MIN/1.73
EOSINOPHIL # BLD AUTO: 0.3 X10E3/UL (ref 0–0.4)
EOSINOPHIL NFR BLD AUTO: 4 %
ERYTHROCYTE [DISTWIDTH] IN BLOOD BY AUTOMATED COUNT: 14.6 % (ref 11.6–15.4)
GLUCOSE SERPL-MCNC: 115 MG/DL (ref 65–99)
HBA1C MFR BLD: 6.5 % (ref 4.8–5.6)
HCT VFR BLD AUTO: 42.4 % (ref 37.5–51)
HDLC SERPL-MCNC: 48 MG/DL
HGB BLD-MCNC: 15 G/DL (ref 13–17.7)
IMM GRANULOCYTES # BLD: 0.1 X10E3/UL (ref 0–0.1)
IMM GRANULOCYTES NFR BLD: 1 %
LDLC SERPL CALC-MCNC: 44 MG/DL (ref 0–99)
LYMPHOCYTES # BLD AUTO: 1.9 X10E3/UL (ref 0.7–3.1)
LYMPHOCYTES NFR BLD AUTO: 23 %
MCH RBC QN AUTO: 31.8 PG (ref 26.6–33)
MCHC RBC AUTO-ENTMCNC: 35.4 G/DL (ref 31.5–35.7)
MCV RBC AUTO: 90 FL (ref 79–97)
MONOCYTES # BLD AUTO: 0.7 X10E3/UL (ref 0.1–0.9)
MONOCYTES NFR BLD AUTO: 8 %
NEUTROPHILS # BLD AUTO: 5.4 X10E3/UL (ref 1.4–7)
NEUTROPHILS NFR BLD AUTO: 64 %
PLATELET # BLD AUTO: 699 X10E3/UL (ref 150–450)
POTASSIUM SERPL-SCNC: 5.4 MMOL/L (ref 3.5–5.2)
RBC # BLD AUTO: 4.72 X10E6/UL (ref 4.14–5.8)
SODIUM SERPL-SCNC: 143 MMOL/L (ref 134–144)
TRIGL SERPL-MCNC: 110 MG/DL (ref 0–149)
WBC # BLD AUTO: 8.4 X10E3/UL (ref 3.4–10.8)

## 2022-06-27 ENCOUNTER — RA CDI HCC (OUTPATIENT)
Dept: OTHER | Facility: HOSPITAL | Age: 77
End: 2022-06-27

## 2022-06-27 NOTE — PROGRESS NOTES
[facility]  coding opportunities          Chart Reviewed number of suggestions sent to Provider: 1  [id number]  3     Patients Insurance     Medicare Insurance: [facility]

## 2022-07-06 ENCOUNTER — OFFICE VISIT (OUTPATIENT)
Dept: INTERNAL MEDICINE CLINIC | Age: 77
End: 2022-07-06
Payer: COMMERCIAL

## 2022-07-06 VITALS
SYSTOLIC BLOOD PRESSURE: 132 MMHG | BODY MASS INDEX: 34.02 KG/M2 | OXYGEN SATURATION: 96 % | HEART RATE: 57 BPM | HEIGHT: 63 IN | WEIGHT: 192 LBS | TEMPERATURE: 98.6 F | DIASTOLIC BLOOD PRESSURE: 66 MMHG

## 2022-07-06 DIAGNOSIS — E11.22 TYPE 2 DIABETES MELLITUS WITH STAGE 3A CHRONIC KIDNEY DISEASE, WITHOUT LONG-TERM CURRENT USE OF INSULIN (HCC): Primary | ICD-10-CM

## 2022-07-06 DIAGNOSIS — I10 BENIGN ESSENTIAL HTN: ICD-10-CM

## 2022-07-06 DIAGNOSIS — D47.3 ESSENTIAL THROMBOCYTHEMIA (HCC): ICD-10-CM

## 2022-07-06 DIAGNOSIS — N18.31 STAGE 3A CHRONIC KIDNEY DISEASE (HCC): ICD-10-CM

## 2022-07-06 DIAGNOSIS — I63.9 CEREBELLAR STROKE (HCC): ICD-10-CM

## 2022-07-06 DIAGNOSIS — N18.31 TYPE 2 DIABETES MELLITUS WITH STAGE 3A CHRONIC KIDNEY DISEASE, WITHOUT LONG-TERM CURRENT USE OF INSULIN (HCC): Primary | ICD-10-CM

## 2022-07-06 DIAGNOSIS — I25.10 CORONARY ARTERY DISEASE INVOLVING NATIVE CORONARY ARTERY OF NATIVE HEART WITHOUT ANGINA PECTORIS: ICD-10-CM

## 2022-07-06 DIAGNOSIS — Z00.00 MEDICARE ANNUAL WELLNESS VISIT, SUBSEQUENT: ICD-10-CM

## 2022-07-06 DIAGNOSIS — R79.89 LOW VITAMIN D LEVEL: ICD-10-CM

## 2022-07-06 DIAGNOSIS — E87.5 HYPERKALEMIA: ICD-10-CM

## 2022-07-06 PROBLEM — K62.5 RECTAL BLEEDING: Status: RESOLVED | Noted: 2020-08-27 | Resolved: 2022-07-06

## 2022-07-06 PROBLEM — J01.00 ACUTE NON-RECURRENT MAXILLARY SINUSITIS: Status: RESOLVED | Noted: 2022-01-04 | Resolved: 2022-07-06

## 2022-07-06 PROCEDURE — 1125F AMNT PAIN NOTED PAIN PRSNT: CPT | Performed by: INTERNAL MEDICINE

## 2022-07-06 PROCEDURE — 1170F FXNL STATUS ASSESSED: CPT | Performed by: INTERNAL MEDICINE

## 2022-07-06 PROCEDURE — 3075F SYST BP GE 130 - 139MM HG: CPT | Performed by: INTERNAL MEDICINE

## 2022-07-06 PROCEDURE — 99214 OFFICE O/P EST MOD 30 MIN: CPT | Performed by: INTERNAL MEDICINE

## 2022-07-06 PROCEDURE — G0439 PPPS, SUBSEQ VISIT: HCPCS | Performed by: INTERNAL MEDICINE

## 2022-07-06 PROCEDURE — 3078F DIAST BP <80 MM HG: CPT | Performed by: INTERNAL MEDICINE

## 2022-07-06 PROCEDURE — 3288F FALL RISK ASSESSMENT DOCD: CPT | Performed by: INTERNAL MEDICINE

## 2022-07-06 PROCEDURE — 1160F RVW MEDS BY RX/DR IN RCRD: CPT | Performed by: INTERNAL MEDICINE

## 2022-07-06 RX ORDER — LOSARTAN POTASSIUM 50 MG/1
50 TABLET ORAL DAILY
Qty: 90 TABLET | Refills: 1 | Status: SHIPPED | OUTPATIENT
Start: 2022-07-06

## 2022-07-06 RX ORDER — CLOPIDOGREL BISULFATE 75 MG/1
75 TABLET ORAL DAILY
Qty: 90 TABLET | Refills: 1 | Status: SHIPPED | OUTPATIENT
Start: 2022-07-06 | End: 2023-07-06

## 2022-07-06 NOTE — PROGRESS NOTES
Diabetic Foot Exam    Patient's shoes and socks removed  Right Foot/Ankle   Right Foot Inspection  Skin Exam: dry skin  Sensory   Monofilament testing: intact    Vascular  The right DP pulse is 2+  The right PT pulse is 2+  Left Foot/Ankle  Left Foot Inspection  Skin Exam: dry skin  Sensory   Monofilament testing: intact    Vascular  The left DP pulse is 2+  The left PT pulse is 2+  Assign Risk Category  No deformity present  No loss of protective sensation  No weak pulses  Risk: 0     Assessment and Plan:     Problem List Items Addressed This Visit        Endocrine    Type 2 diabetes mellitus with stage 3a chronic kidney disease, without long-term current use of insulin ([facility]) - Primary     Hemoglobin A1c is 6 5 which is slightly higher than before  Presently patient is not on meds  Just diet control  Advised for better diet control and will repeat A1c in about 4 months if still high will consider meds  Lab Results   Component Value Date    HGBA1C 6 5 (H) 06/23/2022              Relevant Orders    Hemoglobin A1C       Cardiovascular and Mediastinum    Benign essential HTN     Blood pressure is well controlled on present regimen           Relevant Medications    losartan (COZAAR) 50 mg tablet    Other Relevant Orders    CBC    Coronary artery disease involving native coronary artery of native heart without angina pectoris     No chest pain shortness of breath  Continue with present regimen  Also being followed by cardiologist           Relevant Medications    clopidogrel (PLAVIX) 75 mg tablet       Hematopoietic and Hemostatic    Essential thrombocythemia ([facility])     Platelet count is slightly lower than before    Will continue to monitor           Relevant Medications    clopidogrel (PLAVIX) 75 mg tablet       Genitourinary    CKD (chronic kidney disease) stage 3, GFR 30-59 ml/min ([facility])     Lab Results   Component Value Date    EGFR 51 (L) 06/23/2022    EGFR 31 07/13/2021    EGFR 50 6 05/20/2016    CREATININE 1 41 (H) 06/23/2022    CREATININE 1 21 02/10/2022    CREATININE 1 24 09/30/2021   Renal function is relatively stable  Will continue to monitor  Advised for low-potassium diet           Relevant Orders    Basic metabolic panel       Other    Low vitamin D level    Hyperkalemia     Advised for low-potassium diet  Will repeat repeat BMP next week  Relevant Orders    Basic metabolic panel    Medicare annual wellness visit, subsequent      Other Visit Diagnoses     Cerebellar stroke ([address] )        Relevant Medications    clopidogrel (PLAVIX) 75 mg tablet           Preventive health issues were discussed with patient, and age appropriate screening tests were ordered as noted in patient's After Visit Summary  Personalized health advice and appropriate referrals for health education or preventive services given if needed, as noted in patient's After Visit Summary  History of Present Illness:     Patient presents for a Medicare Wellness Visit    Patient is here for regular follow-up otherwise no new complaints     Patient Care Team:  Claudetta Harry, MD as PCP - General (Internal Medicine)  MD Mariann Pressley MD     Review of Systems:     Review of Systems   Constitutional: Negative for fatigue and fever  HENT: Negative for congestion, ear discharge, ear pain, postnasal drip, sinus pressure, sore throat, tinnitus and trouble swallowing  Eyes: Negative for discharge, itching and visual disturbance  Respiratory: Negative for cough and shortness of breath  Cardiovascular: Negative for chest pain and palpitations  Gastrointestinal: Negative for abdominal pain, diarrhea, nausea and vomiting  Endocrine: Negative for cold intolerance and polyuria  Genitourinary: Negative for difficulty urinating, dysuria and urgency  Musculoskeletal: Negative for arthralgias and neck pain  Skin: Negative for rash     Allergic/Immunologic: Negative for environmental allergies  Neurological: Negative for dizziness, weakness and headaches  Psychiatric/Behavioral: Negative for agitation and behavioral problems  The patient is not nervous/anxious           Problem List:     Patient Active Problem List   Diagnosis    Low vitamin D level    Adolescent idiopathic scoliosis of thoracolumbar region    Abnormal glucose    Benign essential HTN    BPH (benign prostatic hyperplasia)    Hypercholesterolemia    Scoliosis    Coronary artery disease involving native coronary artery of native heart without angina pectoris    Acute gouty arthritis    BMI 35 0-35 9,adult    Dysplastic nevus    Carotid artery stenosis, asymptomatic, bilateral    CKD (chronic kidney disease) stage 3, GFR 30-59 ml/min ([facility])    Slow transit constipation    Obesity, morbid ([facility])    Type 2 diabetes mellitus with stage 3a chronic kidney disease, without long-term current use of insulin ([facility] [facility]    Essential thrombocythemia ([facility] )    Hyperkalemia    Medicare annual wellness visit, subsequent      Past Medical and Surgical History:     Past Medical History:   Diagnosis Date    Actinic keratosis     RESOLVED: [id number]    Adolescent idiopathic scoliosis of thoracolumbar region 04/12/2018    Allergic     Anxiety     Arthritis     Basal cell carcinoma of back     RESOLVED: [id number]    Basal cell carcinoma of skin of upper extremity, including shoulder     BCC (basal cell carcinoma), trunk     BCE (basal cell epithelioma), trunk     RESOLVED: [id number]    Benign neoplasm of skin of face     Coronary artery disease involving native coronary artery of native heart without angina pectoris 10/16/2018    Depression 10/06/2015    Gastroesophageal reflux disease without esophagitis 04/21/2017    GERD (gastroesophageal reflux disease)     Hypertension     Myocardial infarction ([facility] [address] )     Neoplasm of uncertain behavior of skin     Nonmelanoma skin cancer     LAST ASSESSED: [id number]    Scoliosis     Seborrheic keratosis     RESOLVED: [id number]    Stroke [facility])      Past Surgical History:   Procedure Laterality Date    ANAL FISTULOTOMY  06/13/2011    DR SANCHEZ    CORONARY ARTERY BYPASS GRAFT  05/17/2005    TRIPLE;   [address] [facility] [address] SURGERY  10/2009    DR [address] 58 JOINT REPLACEMENT      shoulder replacement    KNEE SURGERY      SHOULDER SURGERY  06/18/2015    JOINT REPLACEMENT; DR Edwards [place] SURGERY  12/2020    spine fusion      Family History:     Family History   Problem Relation Age of Onset    Other Mother         BRAIN TUMOR    No Known Problems Father     Vascular Disease Brother     Cancer Brother     Dementia Brother     Heart disease Brother     COPD Brother     Prostate cancer Brother     Substance Abuse Brother     Drug abuse Brother       Social History:     Social History     Socioeconomic History    Marital status: /Civil Union     Spouse name: None    Number of children: None    Years of education: None    Highest education level: None   Occupational History    None   Tobacco Use    Smoking status: Light Tobacco Smoker     Types: Cigars    Smokeless tobacco: Never Used    Tobacco comment: one cigar once in awhile   Vaping Use    Vaping Use: Never used   Substance and Sexual Activity    Alcohol use:  Yes     Alcohol/week: 1 0 standard drink     Types: 1 Glasses of wine per week     Comment: occasionally    Drug use: No    Sexual activity: Not Currently     Partners: Female     Birth control/protection: Male Sterilization   Other Topics Concern    None   Social History Narrative    None     Social Determinants of Health     Financial Resource Strain: Not on file   Food Insecurity: Not on file   Transportation Needs: Not on file   Physical Activity: Not on file   Stress: Not on file   Social Connections: Not on file   Intimate Partner Violence: Not on file   Housing Stability: Not on file Medications and Allergies:     Current Outpatient Medications   Medication Sig Dispense Refill    allopurinol (ZYLOPRIM) 100 mg tablet Take 1 tablet (100 mg total) by mouth daily (Patient taking differently: Take 100 mg by mouth as needed) 90 tablet 3    carvedilol (COREG) 25 mg tablet Take 1 tablet (25 mg total) by mouth 2 (two) times a day with meals 180 tablet 1    cholecalciferol (VITAMIN D3) 1,000 units tablet Take 1,000 Units by mouth daily      clopidogrel (PLAVIX) 75 mg tablet Take 1 tablet (75 mg total) by mouth daily 90 tablet 1    colchicine (COLCRYS) 0 6 mg tablet Take 1 tablet (0 6 mg total) by mouth daily (Patient taking differently: Take 0 6 mg by mouth daily as needed) 30 tablet 1    ergocalciferol (VITAMIN D2) 50,000 units Take 1 capsule (50,000 Units total) by mouth once a week 12 capsule 0    losartan (COZAAR) 50 mg tablet Take 1 tablet (50 mg total) by mouth daily 90 tablet 1    rosuvastatin (CRESTOR) 10 MG tablet Take 1 tablet (10 mg total) by mouth daily at bedtime 90 tablet 1     No current facility-administered medications for this visit       Allergies   Allergen Reactions    Other      [place]      Immunizations:     Immunization History   Administered Date(s) Administered    COVID-19 PFIZER VACCINE 0 3 ML IM 02/05/2021, 02/24/2021    INFLUENZA 09/11/2018, 08/16/2020, 09/29/2021, 09/29/2021    Influenza Split High Dose Preservative Free IM 11/03/2016    Influenza, seasonal, injectable 10/13/2015, 09/27/2017    Pneumococcal Conjugate 13-Valent 10/06/2015    Pneumococcal Polysaccharide PPV23 05/13/2019    Tdap 10/13/2015    influenza, trivalent, adjuvanted 08/28/2019      Health Maintenance:         Topic Date Due    Colorectal Cancer Screening  10/13/2026    Hepatitis C Screening  Completed         Topic Date Due    COVID-19 Vaccine (3 - Booster for Pfizer series) 07/24/2021    Influenza Vaccine (1) 09/01/2022      Medicare Screening Tests and Risk Assessments: Kizzy Doshi is here for his Subsequent Wellness visit  Last Medicare Wellness visit information reviewed, patient interviewed and updates made to the record today  Health Risk Assessment:   Patient rates overall health as good  Patient feels that their physical health rating is same  Patient is dissatisfied with their life  Eyesight was rated as same  Hearing was rated as same  Patient feels that their emotional and mental health rating is same  Patients states they are never, rarely angry  Patient states they are sometimes unusually tired/fatigued  Pain experienced in the last 7 days has been some  Patient's pain rating has been 3/10  Patient states that he has experienced no weight loss or gain in last 6 months  Fall Risk Screening: In the past year, patient has experienced: no history of falling in past year      Home Safety:  Patient does not have trouble with stairs inside or outside of their home  Patient has working smoke alarms and has working carbon monoxide detector  Home safety hazards include: none  Nutrition:   Current diet is Limited junk food  Medications:   Patient is currently taking over-the-counter supplements  OTC medications include: Vitamin D, Tylenol  Patient is able to manage medications  Activities of Daily Living (ADLs)/Instrumental Activities of Daily Living (IADLs):   Walk and transfer into and out of bed and chair?: Yes  Dress and groom yourself?: Yes    Bathe or shower yourself?: Yes    Feed yourself?  Yes  Do your laundry/housekeeping?: Yes  Manage your money, pay your bills and track your expenses?: Yes  Make your own meals?: Yes    Do your own shopping?: Yes    Previous Hospitalizations:   Any hospitalizations or ED visits within the last 12 months?: Yes    How many hospitalizations have you had in the last year?: 1-2    Advance Care Planning:   Living will: No    Durable POA for healthcare: No    Advanced directive: No      PREVENTIVE SCREENINGS Cardiovascular Screening:    General: Screening Not Indicated and History Lipid Disorder      Diabetes Screening:     General: Screening Not Indicated and History Diabetes      Colorectal Cancer Screening:     General: Screening Current      Prostate Cancer Screening:    General: Screening Not Indicated      Osteoporosis Screening:    General: Screening Not Indicated      Abdominal Aortic Aneurysm (AAA) Screening:    Risk factors include: tobacco use        General: Screening Not Indicated      Lung Cancer Screening:     General: Screening Not Indicated      Hepatitis C Screening:    General: Screening Current    Screening, Brief Intervention, and Referral to Treatment (SBIRT)    Screening  Typical number of drinks in a day: 0  Typical number of drinks in a week: 0  Interpretation: Low risk drinking behavior  AUDIT-C Screenin) How often did you have a drink containing alcohol in the past year? 2 to 4 times a month  2) How many drinks did you have on a typical day when you were drinking in the past year? 1 to 2  3) How often did you have 6 or more drinks on one occasion in the past year? never    AUDIT-C Score: 2  Interpretation: Score 0-3 (male): Negative screen for alcohol misuse    Single Item Drug Screening:  How often have you used an illegal drug (including marijuana) or a prescription medication for non-medical reasons in the past year? never    Single Item Drug Screen Score: 0  Interpretation: Negative screen for possible drug use disorder    Brief Intervention  Alcohol & drug use screenings were reviewed  No concerns regarding substance use disorder identified  Other Counseling Topics:   Car/seat belt/driving safety, skin self-exam, sunscreen and calcium and vitamin D intake and regular weightbearing exercise       No exam data present     Physical Exam:     /66   Pulse 57   Temp 98 6 °F (37 °C) (Temporal)   Ht 5' 3" (1 6 m)   Wt 87 1 kg (192 lb)   SpO2 96%   BMI 34 01 kg/m² Physical Exam  Vitals and nursing note reviewed  Constitutional:       Appearance: He is well-developed  HENT:      Head: Normocephalic and atraumatic  Eyes:      Conjunctiva/sclera: Conjunctivae normal    Cardiovascular:      Rate and Rhythm: Normal rate and regular rhythm  Pulses: no weak pulses          Dorsalis pedis pulses are 2+ on the right side and 2+ on the left side  Posterior tibial pulses are 2+ on the right side and 2+ on the left side  Heart sounds: No murmur heard  Pulmonary:      Effort: Pulmonary effort is normal  No respiratory distress  Breath sounds: Normal breath sounds  Abdominal:      Palpations: Abdomen is soft  Tenderness: There is no abdominal tenderness  Musculoskeletal:      Cervical back: Neck supple  Feet:      Right foot:      Skin integrity: Dry skin present  Left foot:      Skin integrity: Dry skin present  Skin:     General: Skin is warm and dry  Neurological:      Mental Status: He is alert            Petty Dover MD

## 2022-07-06 NOTE — ASSESSMENT & PLAN NOTE
No chest pain shortness of breath  Continue with present regimen    Also being followed by cardiologist

## 2022-07-06 NOTE — PATIENT INSTRUCTIONS
Medicare Preventive Visit Patient Instructions  Thank you for completing your Welcome to Medicare Visit or Medicare Annual Wellness Visit today  Your next wellness visit will be due in one year (7/7/2023)  The screening/preventive services that you may require over the next 5-10 years are detailed below  Some tests may not apply to you based off risk factors and/or age  Screening tests ordered at today's visit but not completed yet may show as past due  Also, please note that scanned in results may not display below  Preventive Screenings:  Service Recommendations Previous Testing/Comments   Colorectal Cancer Screening  · Colonoscopy    · Fecal Occult Blood Test (FOBT)/Fecal Immunochemical Test (FIT)  · Fecal DNA/Cologuard Test  · Flexible Sigmoidoscopy Age: 54-65 years old   Colonoscopy: every 10 years (May be performed more frequently if at higher risk)  OR  FOBT/FIT: every 1 year  OR  Cologuard: every 3 years  OR  Sigmoidoscopy: every 5 years  Screening may be recommended earlier than age 48 if at higher risk for colorectal cancer  Also, an individualized decision between you and your healthcare provider will decide whether screening between the ages of 74-80 would be appropriate   Colonoscopy: 10/13/2021  FOBT/FIT: Not on file  Cologuard: Not on file  Sigmoidoscopy: Not on file    Screening Current     Prostate Cancer Screening Individualized decision between patient and health care provider in men between ages of 53-78   Medicare will cover every 12 months beginning on the day after your 50th birthday PSA: 2 2 ng/mL     Screening Not Indicated     Hepatitis C Screening Once for adults born between 1945 and 1965  More frequently in patients at high risk for Hepatitis C Hep C Antibody: 02/18/2020    Screening Current   Diabetes Screening 1-2 times per year if you're at risk for diabetes or have pre-diabetes Fasting glucose: 167 mg/dL   A1C: 6 5 %    Screening Not Indicated  History Diabetes   Cholesterol Screening Once every 5 years if you don't have a lipid disorder  May order more often based on risk factors  Lipid panel: 06/23/2022    Screening Not Indicated  History Lipid Disorder      Other Preventive Screenings Covered by Medicare:  1  Abdominal Aortic Aneurysm (AAA) Screening: covered once if your at risk  You're considered to be at risk if you have a family history of AAA or a male between the age of 73-68 who smoking at least 100 cigarettes in your lifetime  2  Lung Cancer Screening: covers low dose CT scan once per year if you meet all of the following conditions: (1) Age 50-69; (2) No signs or symptoms of lung cancer; (3) Current smoker or have quit smoking within the last 15 years; (4) You have a tobacco smoking history of at least 30 pack years (packs per day x number of years you smoked); (5) You get a written order from a healthcare provider  3  Glaucoma Screening: covered annually if you're considered high risk: (1) You have diabetes OR (2) Family history of glaucoma OR (3)  aged 48 and older OR (3)  American aged 72 and older  3  Osteoporosis Screening: covered every 2 years if you meet one of the following conditions: (1) Have a vertebral abnormality; (2) On glucocorticoid therapy for more than 3 months; (3) Have primary hyperparathyroidism; (4) On osteoporosis medications and need to assess response to drug therapy  5  HIV Screening: covered annually if you're between the age of 12-76  Also covered annually if you are younger than 13 and older than 72 with risk factors for HIV infection  For pregnant patients, it is covered up to 3 times per pregnancy      Immunizations:  Immunization Recommendations   Influenza Vaccine Annual influenza vaccination during flu season is recommended for all persons aged >= 6 months who do not have contraindications   Pneumococcal Vaccine (Prevnar and Pneumovax)  * Prevnar = PCV13  * Pneumovax = PPSV23 Adults 25-60 years old: 1-3 doses may be recommended based on certain risk factors  Adults 72 years old: Prevnar (PCV13) vaccine recommended followed by Pneumovax (PPSV23) vaccine  If already received PPSV23 since turning 65, then PCV13 recommended at least one year after PPSV23 dose  Hepatitis B Vaccine 3 dose series if at intermediate or high risk (ex: diabetes, end stage renal disease, liver disease)   Tetanus (Td) Vaccine - COST NOT COVERED BY MEDICARE PART B Following completion of primary series, a booster dose should be given every 10 years to maintain immunity against tetanus  Td may also be given as tetanus wound prophylaxis  Tdap Vaccine - COST NOT COVERED BY MEDICARE PART B Recommended at least once for all adults  For pregnant patients, recommended with each pregnancy  Shingles Vaccine (Shingrix) - COST NOT COVERED BY MEDICARE PART B  2 shot series recommended in those aged 48 and above     Health Maintenance Due:      Topic Date Due    Colorectal Cancer Screening  10/13/2026    Hepatitis C Screening  Completed     Immunizations Due:      Topic Date Due    COVID-19 Vaccine (3 - Booster for Pfizer series) 07/24/2021    Influenza Vaccine (1) 09/01/2022     Advance Directives   What are advance directives? Advance directives are legal documents that state your wishes and plans for medical care  These plans are made ahead of time in case you lose your ability to make decisions for yourself  Advance directives can apply to any medical decision, such as the treatments you want, and if you want to donate organs  What are the types of advance directives? There are many types of advance directives, and each state has rules about how to use them  You may choose a combination of any of the following:  · Living will: This is a written record of the treatment you want  You can also choose which treatments you do not want, which to limit, and which to stop at a certain time  This includes surgery, medicine, IV fluid, and tube feedings  · Durable power of  for healthcare [facility]): This is a written record that states who you want to make healthcare choices for you when you are unable to make them for yourself  This person, called a proxy, is usually a family member or a friend  You may choose more than 1 proxy  · Do not resuscitate (DNR) order:  A DNR order is used in case your heart stops beating or you stop breathing  It is a request not to have certain forms of treatment, such as CPR  A DNR order may be included in other types of advance directives  · Medical directive: This covers the care that you want if you are in a coma, near death, or unable to make decisions for yourself  You can list the treatments you want for each condition  Treatment may include pain medicine, surgery, blood transfusions, dialysis, IV or tube feedings, and a ventilator (breathing machine)  · Values history: This document has questions about your views, beliefs, and how you feel and think about life  This information can help others choose the care that you would choose  Why are advance directives important? An advance directive helps you control your care  Although spoken wishes may be used, it is better to have your wishes written down  Spoken wishes can be misunderstood, or not followed  Treatments may be given even if you do not want them  An advance directive may make it easier for your family to make difficult choices about your care  Cigarette Smoking and Your Health   Risks to your health if you smoke:  Nicotine and other chemicals found in tobacco damage every cell in your body  Even if you are a light smoker, you have an increased risk for cancer, heart disease, and lung disease  If you are pregnant or have diabetes, smoking increases your risk for complications  Benefits to your health if you stop smoking:   · You decrease respiratory symptoms such as coughing, wheezing, and shortness of breath     · You reduce your risk for cancers of the lung, mouth, throat, kidney, bladder, pancreas, stomach, and cervix  If you already have cancer, you increase the benefits of chemotherapy  You also reduce your risk for cancer returning or a second cancer from developing  · You reduce your risk for heart disease, blood clots, heart attack, and stroke  · You reduce your risk for lung infections, and diseases such as pneumonia, asthma, chronic bronchitis, and emphysema  · Your circulation improves  More oxygen can be delivered to your body  If you have diabetes, you lower your risk for complications, such as kidney, artery, and eye diseases  You also lower your risk for nerve damage  Nerve damage can lead to amputations, poor vision, and blindness  · You improve your body's ability to heal and to fight infections  For more information and support to stop smoking:   · [employer]  Phone: 8- 135 - [phone]  Web Address: [employer]  Weight Management   Why it is important to manage your weight:  Being overweight increases your risk of health conditions such as heart disease, high blood pressure, type 2 diabetes, and certain types of cancer  It can also increase your risk for osteoarthritis, sleep apnea, and other respiratory problems  Aim for a slow, steady weight loss  Even a small amount of weight loss can lower your risk of health problems  How to lose weight safely:  A safe and healthy way to lose weight is to eat fewer calories and get regular exercise  You can lose up about 1 pound a week by decreasing the number of calories you eat by 500 calories each day  Healthy meal plan for weight management:  A healthy meal plan includes a variety of foods, contains fewer calories, and helps you stay healthy  A healthy meal plan includes the following:  · Eat whole-grain foods more often  A healthy meal plan should contain fiber  Fiber is the part of grains, fruits, and vegetables that is not broken down by your body   Whole-grain foods are healthy and provide extra fiber in your diet  Some examples of whole-grain foods are whole-wheat breads and pastas, oatmeal, brown rice, and bulgur  · Eat a variety of vegetables every day  Include dark, leafy greens such as spinach, kale, raúl greens, and mustard greens  Eat yellow and orange vegetables such as carrots, sweet potatoes, and winter squash  · Eat a variety of fruits every day  Choose fresh or canned fruit (canned in its own juice or light syrup) instead of juice  Fruit juice has very little or no fiber  · Eat low-fat dairy foods  Drink fat-free (skim) milk or 1% milk  Eat fat-free yogurt and low-fat cottage cheese  Try low-fat cheeses such as mozzarella and other reduced-fat cheeses  · Choose meat and other protein foods that are low in fat  Choose beans or other legumes such as split peas or lentils  Choose fish, skinless poultry (chicken or turkey), or lean cuts of red meat (beef or pork)  Before you cook meat or poultry, cut off any visible fat  · Use less fat and oil  Try baking foods instead of frying them  Add less fat, such as margarine, sour cream, regular salad dressing and mayonnaise to foods  Eat fewer high-fat foods  Some examples of high-fat foods include french fries, doughnuts, ice cream, and cakes  · Eat fewer sweets  Limit foods and drinks that are high in sugar  This includes candy, cookies, regular soda, and sweetened drinks  Exercise:  Exercise at least 30 minutes per day on most days of the week  Some examples of exercise include walking, biking, dancing, and swimming  You can also fit in more physical activity by taking the stairs instead of the elevator or parking farther away from stores  Ask your healthcare provider about the best exercise plan for you  © Copyright [employer] 2018 Information is for End User's use only and may not be sold, redistributed or otherwise used for commercial purposes   All illustrations and images included in CareNotes® are the copyrighted property of A [employer] A M [employer] Inc  or [address]   10% - bad control"> 10% - bad control,Hemoglobin A1c (HbA1c) greater than 10% indicating poor diabetic control,Haemoglobin A1c greater than 10% indicating poor diabetic control">   Diabetes Mellitus Type 2 in Adults, Ambulatory Care   GENERAL INFORMATION:   Diabetes mellitus type 2  is a disease that affects how your body uses glucose (sugar)  Insulin helps move sugar out of the blood so it can be used for energy  Normally, when the blood sugar level increases, the pancreas makes more insulin  Type 2 diabetes develops because either the body cannot make enough insulin, or it cannot use the insulin correctly  After many years, your pancreas may stop making insulin  Common symptoms include the following:   · More hunger or thirst than usual     · Frequent urination     · Weight loss without trying     · Blurred vision  Seek immediate care for the following symptoms:   · Severe abdominal pain, or pain that spreads to your back  You may also be vomiting  · Trouble staying awake or focusing    · Shaking or sweating    · Blurred or double vision    · Breath has a fruity, sweet smell    · Breathing is deep and labored, or rapid and shallow    · Heartbeat is fast and weak  Treatment for diabetes mellitus type 2  includes keeping your blood sugar at a normal level  You must eat the right foods, and exercise regularly  You may also need medicine if you cannot control your blood sugar level with nutrition and exercise  Manage diabetes mellitus type 2:   · Check your blood sugar level  You will be taught how to check a small drop of blood in a glucose monitor  Ask your healthcare provider when and how often to check during the day  Ask your healthcare provider what your blood sugar levels should be when you check them  · Keep track of carbohydrates (sugar and starchy foods)  Your blood sugar level can get too high if you eat too many carbohydrates   Your dietitian will help you plan meals and snacks that have the right amount of carbohydrates  · Eat low-fat foods  Some examples are skinless chicken and low-fat milk  · Eat less sodium (salt)  Some examples of high-sodium foods to limit are soy sauce, potato chips, and soup  Do not add salt to food you cook  Limit your use of table salt  · Eat high-fiber foods  Foods that are a good source of fiber include vegetables, whole grain bread, and beans  · Limit alcohol  Alcohol affects your blood sugar level and can make it harder to manage your diabetes  Women should limit alcohol to 1 drink a day  Men should limit alcohol to 2 drinks a day  A drink of alcohol is 12 ounces of beer, 5 ounces of wine, or 1½ ounces of liquor  · Get regular exercise  Exercise can help keep your blood sugar level steady, decrease your risk of heart disease, and help you lose weight  Exercise for at least 30 minutes, 5 days a week  Include muscle strengthening activities 2 days each week  Work with your healthcare provider to create an exercise plan  · Check your feet each day  for injuries or open sores  Ask your healthcare provider for activities you can do if you have an open sore  · Quit smoking  If you smoke, it is never too late to quit  Smoking can worsen the problems that may occur with diabetes  Ask your healthcare provider for information about how to stop smoking if you are having trouble quitting  · Ask about your weight:  Ask healthcare providers if you need to lose weight, and how much to lose  Ask them to help you with a weight loss program  Even a 10 to 15 pound weight loss can help you manage your blood sugar level  · Carry medical alert identification  Wear medical alert jewelry or carry a card that says you have diabetes  Ask your healthcare provider where to get these items  · Ask about vaccines    Diabetes puts you at risk of serious illness if you get the flu, pneumonia, or hepatitis  Ask your healthcare provider if you should get a flu, pneumonia, or hepatitis B vaccine, and when to get the vaccine  Follow up with your healthcare provider as directed:  Write down your questions so you remember to ask them during your visits  CARE AGREEMENT:   You have the right to help plan your care  Learn about your health condition and how it may be treated  Discuss treatment options with your caregivers to decide what care you want to receive  You always have the right to refuse treatment  The above information is an  only  It is not intended as medical advice for individual conditions or treatments  Talk to your doctor, nurse or pharmacist before following any medical regimen to see if it is safe and effective for you  © 2014 [address] is for End User's use only and may not be sold, redistributed or otherwise used for commercial purposes  All illustrations and images included in CareNotes® are the copyrighted property of [employer]  or Thaddeus Romero

## 2022-07-06 NOTE — ASSESSMENT & PLAN NOTE
Hemoglobin A1c is 6 5 which is slightly higher than before  Presently patient is not on meds  Just diet control  Advised for better diet control and will repeat A1c in about 4 months if still high will consider meds    Lab Results   Component Value Date    HGBA1C 6 5 (H) 06/23/2022

## 2022-07-06 NOTE — ASSESSMENT & PLAN NOTE
Lab Results   Component Value Date    EGFR 51 (L) 06/23/2022    EGFR 31 07/13/2021    EGFR 50 6 05/20/2016    CREATININE 1 41 (H) 06/23/2022    CREATININE 1 21 02/10/2022    CREATININE 1 24 09/30/2021   Renal function is relatively stable  Will continue to monitor    Advised for low-potassium diet

## 2022-07-07 ENCOUNTER — TELEPHONE (OUTPATIENT)
Dept: ADMINISTRATIVE | Facility: OTHER | Age: 77
End: 2022-07-07

## 2022-07-07 NOTE — LETTER
2nd Request   Diabetic Eye Exam Form    Date Requested: 22  Patient: Jacob Lassiter  Patient : 1945   Referring Provider: Von Cowan MD    DIABETIC Eye Exam Date _______________________________    Type of Exam MUST be documented for Diabetic Eye Exams  Please CHECK ONE  Retinal Exam       Dilated Retinal Exam       OCT       Optomap-Iris Exam      Fundus Photography     Left Eye - Please check Retinopathy AND Type or No Retinopathy      Exam did show retinopathy    Exam did not show retinopathy         Mild     Proliferative           Moderate    Severe            None         Right Eye - Please check Retinopathy AND Type or No Retinopathy     Exam did show retinopathy    Exam did not show retinopathy         Mild     Proliferative        Moderate    Severe        None       Comments __________________________________________________________    Practice Providing Exam ______________________________________________    Exam Performed By (print name) _______________________________________      Provider Signature ___________________________________________________    These reports are needed for  compliance  Please fax this completed form and a copy of the Diabetic Eye Exam report to our office located at [place] [address] as soon as possible via [phone] attention Susan: Phone [phone]  We thank you for your assistance in treating our mutual patient

## 2022-07-07 NOTE — TELEPHONE ENCOUNTER
----- Message from Von Gates sent at 7/6/2022  6:12 PM EDT -----  Regarding: diabetic eye exam  07/06/22 6:12 PM    Hello, our patient Sarah Rey has had Diabetic Eye Exam completed/performed   Please assist in updating the patient chart by making an External outreach [facility] in [facility] The date of service is 4/20/22    Thank you,  Von Gates    Shashi 89

## 2022-07-07 NOTE — TELEPHONE ENCOUNTER
Upon review of the In Basket request and the patient's chart, initial outreach has been made via telephone call, please see Contacts section for details       Thank you  Balta Townsend

## 2022-07-07 NOTE — LETTER
Final Request  Diabetic Eye Exam Form    Date Requested: 07/15/22  Patient: Antionette Benitez  Patient : 1945   Referring Provider: Leatha Prasad MD    DIABETIC Eye Exam Date _______________________________    Type of Exam MUST be documented for Diabetic Eye Exams  Please CHECK ONE  Retinal Exam       Dilated Retinal Exam       OCT       Optomap-Iris Exam      Fundus Photography     Left Eye - Please check Retinopathy AND Type or No Retinopathy      Exam did show retinopathy    Exam did not show retinopathy         Mild     Proliferative           Moderate    Severe            None         Right Eye - Please check Retinopathy AND Type or No Retinopathy     Exam did show retinopathy    Exam did not show retinopathy         Mild     Proliferative        Moderate    Severe        None       Comments __________________________________________________________    Practice Providing Exam ______________________________________________    Exam Performed By (print name) _______________________________________      Provider Signature ___________________________________________________    These reports are needed for  compliance  Please fax this completed form and a copy of the Diabetic Eye Exam report to our office located at [place] [address] as soon as possible via [phone] attention Susan: Phone [phone]  We thank you for your assistance in treating our mutual patient

## 2022-07-12 NOTE — TELEPHONE ENCOUNTER
As a follow-up, a second attempt has been made for outreach via fax, please see Contacts section for details      Thank you  Hemanth Trevino

## 2022-07-14 LAB
BUN SERPL-MCNC: 21 MG/DL (ref 8–27)
BUN/CREAT SERPL: 15 (ref 10–24)
CALCIUM SERPL-MCNC: 9.5 MG/DL (ref 8.6–10.2)
CHLORIDE SERPL-SCNC: 105 MMOL/L (ref 96–106)
CO2 SERPL-SCNC: 21 MMOL/L (ref 20–29)
CREAT SERPL-MCNC: 1.36 MG/DL (ref 0.76–1.27)
EGFR: 53 ML/MIN/1.73
GLUCOSE SERPL-MCNC: 129 MG/DL (ref 65–99)
POTASSIUM SERPL-SCNC: 5.2 MMOL/L (ref 3.5–5.2)
SODIUM SERPL-SCNC: 140 MMOL/L (ref 134–144)

## 2022-08-03 ENCOUNTER — TELEPHONE (OUTPATIENT)
Dept: INTERNAL MEDICINE CLINIC | Age: 77
End: 2022-08-03

## 2022-08-03 NOTE — TELEPHONE ENCOUNTER
8/3/22 called [facility] about pt stating had DM eye exam spring 2020 - they state pt did not-- called pt - he is going to call [facility] and sched with them

## 2022-08-03 NOTE — TELEPHONE ENCOUNTER
Called [facility] pt did not have DM eye exam spring 2022- called pt he is going to sched DM eye exam - let him know this is per Dr Heena Alva

## 2022-10-11 PROBLEM — Z00.00 MEDICARE ANNUAL WELLNESS VISIT, SUBSEQUENT: Status: RESOLVED | Noted: 2022-07-06 | Resolved: 2022-10-11

## 2022-11-01 LAB
BASOPHILS # BLD AUTO: 0 X10E3/UL (ref 0–0.2)
BASOPHILS NFR BLD AUTO: 0 %
BUN SERPL-MCNC: 18 MG/DL (ref 8–27)
BUN/CREAT SERPL: 14 (ref 10–24)
CALCIUM SERPL-MCNC: 9.8 MG/DL (ref 8.6–10.2)
CHLORIDE SERPL-SCNC: 107 MMOL/L (ref 96–106)
CO2 SERPL-SCNC: 21 MMOL/L (ref 20–29)
CREAT SERPL-MCNC: 1.26 MG/DL (ref 0.76–1.27)
EGFR: 58 ML/MIN/1.73
EOSINOPHIL # BLD AUTO: 0.3 X10E3/UL (ref 0–0.4)
EOSINOPHIL NFR BLD AUTO: 4 %
ERYTHROCYTE [DISTWIDTH] IN BLOOD BY AUTOMATED COUNT: 13.9 % (ref 11.6–15.4)
GLUCOSE SERPL-MCNC: 124 MG/DL (ref 70–99)
HBA1C MFR BLD: 6.3 % (ref 4.8–5.6)
HCT VFR BLD AUTO: 42.2 % (ref 37.5–51)
HGB BLD-MCNC: 14.6 G/DL (ref 13–17.7)
IMM GRANULOCYTES # BLD: 0.1 X10E3/UL (ref 0–0.1)
IMM GRANULOCYTES NFR BLD: 1 %
LYMPHOCYTES # BLD AUTO: 1.7 X10E3/UL (ref 0.7–3.1)
LYMPHOCYTES NFR BLD AUTO: 24 %
MCH RBC QN AUTO: 30.7 PG (ref 26.6–33)
MCHC RBC AUTO-ENTMCNC: 34.6 G/DL (ref 31.5–35.7)
MCV RBC AUTO: 89 FL (ref 79–97)
MONOCYTES # BLD AUTO: 0.6 X10E3/UL (ref 0.1–0.9)
MONOCYTES NFR BLD AUTO: 8 %
NEUTROPHILS # BLD AUTO: 4.5 X10E3/UL (ref 1.4–7)
NEUTROPHILS NFR BLD AUTO: 63 %
PLATELET # BLD AUTO: 767 X10E3/UL (ref 150–450)
POTASSIUM SERPL-SCNC: 5.2 MMOL/L (ref 3.5–5.2)
RBC # BLD AUTO: 4.75 X10E6/UL (ref 4.14–5.8)
SODIUM SERPL-SCNC: 140 MMOL/L (ref 134–144)
WBC # BLD AUTO: 7.1 X10E3/UL (ref 3.4–10.8)

## 2022-11-14 ENCOUNTER — OFFICE VISIT (OUTPATIENT)
Dept: INTERNAL MEDICINE CLINIC | Age: 77
End: 2022-11-14

## 2022-11-14 VITALS
SYSTOLIC BLOOD PRESSURE: 126 MMHG | OXYGEN SATURATION: 96 % | BODY MASS INDEX: 33.49 KG/M2 | TEMPERATURE: 98.1 F | WEIGHT: 189 LBS | HEIGHT: 63 IN | HEART RATE: 74 BPM | DIASTOLIC BLOOD PRESSURE: 72 MMHG

## 2022-11-14 DIAGNOSIS — E78.00 HYPERCHOLESTEROLEMIA: ICD-10-CM

## 2022-11-14 DIAGNOSIS — N18.31 TYPE 2 DIABETES MELLITUS WITH STAGE 3A CHRONIC KIDNEY DISEASE, WITHOUT LONG-TERM CURRENT USE OF INSULIN (HCC): Primary | ICD-10-CM

## 2022-11-14 DIAGNOSIS — I25.10 CORONARY ARTERY DISEASE INVOLVING NATIVE CORONARY ARTERY OF NATIVE HEART WITHOUT ANGINA PECTORIS: ICD-10-CM

## 2022-11-14 DIAGNOSIS — D47.3 ESSENTIAL THROMBOCYTHEMIA (HCC): ICD-10-CM

## 2022-11-14 DIAGNOSIS — E11.22 TYPE 2 DIABETES MELLITUS WITH STAGE 3A CHRONIC KIDNEY DISEASE, WITHOUT LONG-TERM CURRENT USE OF INSULIN (HCC): Primary | ICD-10-CM

## 2022-11-14 DIAGNOSIS — I10 BENIGN ESSENTIAL HTN: ICD-10-CM

## 2022-11-14 DIAGNOSIS — E66.01 OBESITY, MORBID (HCC): ICD-10-CM

## 2022-11-14 DIAGNOSIS — I65.23 CAROTID ARTERY STENOSIS, ASYMPTOMATIC, BILATERAL: ICD-10-CM

## 2022-11-14 DIAGNOSIS — N18.31 STAGE 3A CHRONIC KIDNEY DISEASE (HCC): ICD-10-CM

## 2022-11-14 RX ORDER — VIT C/B6/B5/MAGNESIUM/HERB 173 50-5-6-5MG
1 CAPSULE ORAL DAILY
COMMUNITY

## 2022-11-14 RX ORDER — CARVEDILOL 25 MG/1
25 TABLET ORAL 2 TIMES DAILY WITH MEALS
Qty: 180 TABLET | Refills: 1 | Status: SHIPPED | OUTPATIENT
Start: 2022-11-14

## 2022-11-14 NOTE — ASSESSMENT & PLAN NOTE
Lab Results   Component Value Date    HGBA1C 6 3 (H) 10/31/2022   Doing well on diabetic diet    Will continue to monitor

## 2022-11-14 NOTE — PROGRESS NOTES
Assessment/Plan:    Type 2 diabetes mellitus with stage 3a chronic kidney disease, without long-term current use of insulin ([facility])    Lab Results   Component Value Date    HGBA1C 6 3 (H) 10/31/2022   Doing well on diabetic diet  Will continue to monitor    Benign essential HTN  Blood pressure is stable on present regimen    Coronary artery disease involving native coronary artery of native heart without angina pectoris  No chest pain or shortness of breath  Also being followed by cardiologist    Carotid artery stenosis, asymptomatic, bilateral  No symptoms of dizziness  Will continue to monitor    Essential thrombocythemia ([facility] [address] )  Platelet count slightly elevated as compared to previous  Will continue to monitor    CKD (chronic kidney disease) stage 3, GFR 30-59 ml/min ([facility])  Lab Results   Component Value Date    EGFR 58 (L) 10/31/2022    EGFR 53 (L) 07/13/2022    EGFR 51 (L) 06/23/2022    CREATININE 1 26 10/31/2022    CREATININE 1 36 (H) 07/13/2022    CREATININE 1 41 (H) 06/23/2022   Stable  Will continue to monitor  Obesity, morbid ([facility] [address] )  Again advised for low calorie diet       Diagnoses and all orders for this visit:    Type 2 diabetes mellitus with stage 3a chronic kidney disease, without long-term current use of insulin ([facility])  -     CBC; Future  -     Lipid Panel with Direct LDL reflex; Future  -     Comprehensive metabolic panel; Future  -     Hemoglobin A1C; Future  -     Microalbumin / creatinine urine ratio    Hypercholesterolemia  -     carvedilol (COREG) 25 mg tablet; Take 1 tablet (25 mg total) by mouth 2 (two) times a day with meals    Benign essential HTN    Coronary artery disease involving native coronary artery of native heart without angina pectoris    Essential thrombocythemia (HCC)    Stage 3a chronic kidney disease ([facility])    BMI 35 0-35 9,adult    Obesity, morbid ([address] )    Carotid artery stenosis, asymptomatic, bilateral    Other orders  -     Turmeric 500 MG CAPS;  Take 1 capsule by mouth in the morning            Tobacco Cessation Counseling: Tobacco cessation counseling was provided  The patient is sincerely urged to quit consumption of tobacco  He is not ready to quit tobacco  Medication options and side effects of medication discussed  Patient refused medication  Subjective:          Patient ID: Ana Grimm is a 76 y o  male  Patient is here for follow-up  Also have blood work done and no new      The following portions of the patient's history were reviewed and updated as appropriate: allergies, current medications, past family history, past medical history, past social history, past surgical history and problem list     Review of Systems   Constitutional: Negative for fatigue and fever  HENT: Negative for congestion, ear discharge, ear pain, postnasal drip, sinus pressure, sore throat, tinnitus and trouble swallowing  Eyes: Negative for discharge, itching and visual disturbance  Respiratory: Negative for cough and shortness of breath  Cardiovascular: Negative for chest pain and palpitations  Gastrointestinal: Negative for abdominal pain, diarrhea, nausea and vomiting  Endocrine: Negative for cold intolerance and polyuria  Genitourinary: Negative for difficulty urinating, dysuria and urgency  Musculoskeletal: Positive for arthralgias  Negative for neck pain  Skin: Negative for rash  Allergic/Immunologic: Negative for environmental allergies  Neurological: Negative for dizziness, weakness and headaches  Psychiatric/Behavioral: Negative for agitation and behavioral problems  The patient is not nervous/anxious            Past Medical History:   Diagnosis Date   • Actinic keratosis     RESOLVED: [id number]   • Adolescent idiopathic scoliosis of thoracolumbar region 04/12/2018   • Allergic    • Anxiety    • Arthritis    • Basal cell carcinoma of back     RESOLVED: [id number]   • Basal cell carcinoma of skin of upper extremity, including shoulder    • BCC (basal cell carcinoma), trunk    • BCE (basal cell epithelioma), trunk     RESOLVED: [id number]   • Benign neoplasm of skin of face    • Coronary artery disease involving native coronary artery of native heart without angina pectoris 10/16/2018   • Depression 10/06/2015   • Gastroesophageal reflux disease without esophagitis 04/21/2017   • GERD (gastroesophageal reflux disease)    • Hypertension    • Myocardial infarction [facility])    • Neoplasm of uncertain behavior of skin    • Nonmelanoma skin cancer     LAST ASSESSED: [id number]   • Scoliosis    • Seborrheic keratosis     RESOLVED: [id number]   • Stroke [facility])          Current Outpatient Medications:   •  allopurinol (ZYLOPRIM) 100 mg tablet, Take 1 tablet (100 mg total) by mouth daily (Patient taking differently: Take 100 mg by mouth as needed), Disp: 90 tablet, Rfl: 3  •  carvedilol (COREG) 25 mg tablet, Take 1 tablet (25 mg total) by mouth 2 (two) times a day with meals, Disp: 180 tablet, Rfl: 1  •  cholecalciferol (VITAMIN D3) 1,000 units tablet, Take 1,000 Units by mouth daily, Disp: , Rfl:   •  clopidogrel (PLAVIX) 75 mg tablet, Take 1 tablet (75 mg total) by mouth daily, Disp: 90 tablet, Rfl: 1  •  colchicine (COLCRYS) 0 6 mg tablet, Take 1 tablet (0 6 mg total) by mouth daily (Patient taking differently: Take 0 6 mg by mouth daily as needed), Disp: 30 tablet, Rfl: 1  •  ergocalciferol (VITAMIN D2) 50,000 units, Take 1 capsule (50,000 Units total) by mouth once a week, Disp: 12 capsule, Rfl: 0  •  losartan (COZAAR) 50 mg tablet, Take 1 tablet (50 mg total) by mouth daily, Disp: 90 tablet, Rfl: 1  •  Turmeric 500 MG CAPS, Take 1 capsule by mouth in the morning, Disp: , Rfl:   •  rosuvastatin (CRESTOR) 10 MG tablet, Take 1 tablet (10 mg total) by mouth daily at bedtime (Patient not taking: Reported on 11/14/2022), Disp: 90 tablet, Rfl: 1    Allergies   Allergen Reactions   • Other      [place] trees       Social History   Past Surgical History:   Procedure Laterality Date   • ANAL FISTULOTOMY  06/13/2011    DR SANCHEZ   • CORONARY ARTERY BYPASS GRAFT  05/17/2005    TRIPLE; DR Jonah Vargas  10/2009    DR MARRERO   • JOINT REPLACEMENT      shoulder replacement   • KNEE SURGERY     • SHOULDER SURGERY  06/18/2015    JOINT REPLACEMENT; DR Alondra Bradley   • SPINE SURGERY  12/2020    spine fusion     Family History   Problem Relation Age of Onset   • Other Mother         BRAIN TUMOR   • No Known Problems Father    • Vascular Disease Brother    • Cancer Brother    • Dementia Brother    • Heart disease Brother    • COPD Brother    • Prostate cancer Brother    • Substance Abuse Brother    • Drug abuse Brother        Objective:  /72 (BP Location: Left arm, Patient Position: Sitting, Cuff Size: Large)   Pulse 74   Temp 98 1 °F (36 7 °C) (Temporal)   Ht 5' 3" (1 6 m)   Wt 85 7 kg (189 lb)   SpO2 96% Comment: room air  BMI 33 48 kg/m²   Body mass index is 33 48 kg/m²  Physical Exam  Constitutional:       Appearance: He is well-developed  He is obese  He is not toxic-appearing or diaphoretic  HENT:      Head: Normocephalic  Right Ear: External ear normal  There is no impacted cerumen  Left Ear: External ear normal  There is no impacted cerumen  Mouth/Throat:      Pharynx: No posterior oropharyngeal erythema  Eyes:      General: No scleral icterus  Pupils: Pupils are equal, round, and reactive to light  Neck:      Thyroid: No thyromegaly  Trachea: No tracheal deviation  Cardiovascular:      Rate and Rhythm: Normal rate and regular rhythm  Heart sounds: Normal heart sounds  No murmur heard  Pulmonary:      Effort: Pulmonary effort is normal  No respiratory distress  Breath sounds: Normal breath sounds  Chest:      Chest wall: No tenderness  Abdominal:      General: Bowel sounds are normal       Palpations: Abdomen is soft  There is no mass  Tenderness: There is no abdominal tenderness  Musculoskeletal:         General: Normal range of motion  Cervical back: Normal range of motion and neck supple  Right lower leg: No edema  Left lower leg: No edema  Lymphadenopathy:      Cervical: No cervical adenopathy  Skin:     General: Skin is warm  Neurological:      Mental Status: He is alert and oriented to person, place, and time  Cranial Nerves: No cranial nerve deficit     Psychiatric:         Mood and Affect: Mood normal          Behavior: Behavior normal

## 2022-11-14 NOTE — PATIENT INSTRUCTIONS

## 2022-11-14 NOTE — ASSESSMENT & PLAN NOTE
Lab Results   Component Value Date    EGFR 58 (L) 10/31/2022    EGFR 53 (L) 07/13/2022    EGFR 51 (L) 06/23/2022    CREATININE 1 26 10/31/2022    CREATININE 1 36 (H) 07/13/2022    CREATININE 1 41 (H) 06/23/2022   Stable  Will continue to monitor

## 2023-01-24 DIAGNOSIS — I63.9 CEREBELLAR STROKE (HCC): ICD-10-CM

## 2023-01-24 DIAGNOSIS — I10 BENIGN ESSENTIAL HTN: ICD-10-CM

## 2023-01-24 RX ORDER — CLOPIDOGREL BISULFATE 75 MG/1
75 TABLET ORAL DAILY
Qty: 90 TABLET | Refills: 1 | Status: SHIPPED | OUTPATIENT
Start: 2023-01-24

## 2023-01-24 RX ORDER — LOSARTAN POTASSIUM 50 MG/1
50 TABLET ORAL DAILY
Qty: 90 TABLET | Refills: 1 | Status: SHIPPED | OUTPATIENT
Start: 2023-01-24

## 2023-05-02 LAB
ALBUMIN SERPL-MCNC: 4.4 G/DL (ref 3.7–4.7)
ALBUMIN/CREAT UR: <2 MG/G CREAT (ref 0–29)
ALBUMIN/GLOB SERPL: 2.2 {RATIO} (ref 1.2–2.2)
ALP SERPL-CCNC: 56 IU/L (ref 44–121)
ALT SERPL-CCNC: 14 IU/L (ref 0–44)
AST SERPL-CCNC: 19 IU/L (ref 0–40)
BASOPHILS # BLD AUTO: 0 X10E3/UL (ref 0–0.2)
BASOPHILS NFR BLD AUTO: 0 %
BILIRUB SERPL-MCNC: 0.2 MG/DL (ref 0–1.2)
BUN SERPL-MCNC: 18 MG/DL (ref 8–27)
BUN/CREAT SERPL: 13 (ref 10–24)
CALCIUM SERPL-MCNC: 9.7 MG/DL (ref 8.6–10.2)
CHLORIDE SERPL-SCNC: 104 MMOL/L (ref 96–106)
CHOLEST SERPL-MCNC: 158 MG/DL (ref 100–199)
CO2 SERPL-SCNC: 21 MMOL/L (ref 20–29)
CREAT SERPL-MCNC: 1.35 MG/DL (ref 0.76–1.27)
CREAT UR-MCNC: 122.1 MG/DL
EGFR: 53 ML/MIN/1.73
EOSINOPHIL # BLD AUTO: 0.4 X10E3/UL (ref 0–0.4)
EOSINOPHIL NFR BLD AUTO: 5 %
ERYTHROCYTE [DISTWIDTH] IN BLOOD BY AUTOMATED COUNT: 14.3 % (ref 11.6–15.4)
GLOBULIN SER-MCNC: 2 G/DL (ref 1.5–4.5)
GLUCOSE SERPL-MCNC: 114 MG/DL (ref 70–99)
HBA1C MFR BLD: 6 % (ref 4.8–5.6)
HCT VFR BLD AUTO: 42.8 % (ref 37.5–51)
HDLC SERPL-MCNC: 39 MG/DL
HGB BLD-MCNC: 15 G/DL (ref 13–17.7)
IMM GRANULOCYTES # BLD: 0.1 X10E3/UL (ref 0–0.1)
IMM GRANULOCYTES NFR BLD: 1 %
LDLC SERPL CALC-MCNC: 88 MG/DL (ref 0–99)
LYMPHOCYTES # BLD AUTO: 2.6 X10E3/UL (ref 0.7–3.1)
LYMPHOCYTES NFR BLD AUTO: 30 %
MCH RBC QN AUTO: 30.7 PG (ref 26.6–33)
MCHC RBC AUTO-ENTMCNC: 35 G/DL (ref 31.5–35.7)
MCV RBC AUTO: 88 FL (ref 79–97)
MICROALBUMIN UR-MCNC: <3 UG/ML
MONOCYTES # BLD AUTO: 0.5 X10E3/UL (ref 0.1–0.9)
MONOCYTES NFR BLD AUTO: 6 %
NEUTROPHILS # BLD AUTO: 5.1 X10E3/UL (ref 1.4–7)
NEUTROPHILS NFR BLD AUTO: 58 %
PLATELET # BLD AUTO: 903 X10E3/UL (ref 150–450)
POTASSIUM SERPL-SCNC: 5.4 MMOL/L (ref 3.5–5.2)
PROT SERPL-MCNC: 6.4 G/DL (ref 6–8.5)
RBC # BLD AUTO: 4.89 X10E6/UL (ref 4.14–5.8)
SODIUM SERPL-SCNC: 141 MMOL/L (ref 134–144)
TRIGL SERPL-MCNC: 178 MG/DL (ref 0–149)
WBC # BLD AUTO: 8.8 X10E3/UL (ref 3.4–10.8)

## 2023-05-08 ENCOUNTER — RA CDI HCC (OUTPATIENT)
Dept: OTHER | Facility: HOSPITAL | Age: 78
End: 2023-05-08

## 2023-05-08 NOTE — PROGRESS NOTES
[facility]  coding opportunities       Chart reviewed, no opportunity found:   [address]        Patients Insurance     Medicare Insurance: [facility] [employer]

## 2023-05-15 ENCOUNTER — OFFICE VISIT (OUTPATIENT)
Dept: INTERNAL MEDICINE CLINIC | Age: 78
End: 2023-05-15

## 2023-05-15 VITALS
WEIGHT: 182.7 LBS | DIASTOLIC BLOOD PRESSURE: 78 MMHG | BODY MASS INDEX: 32.37 KG/M2 | HEART RATE: 66 BPM | HEIGHT: 63 IN | SYSTOLIC BLOOD PRESSURE: 136 MMHG | TEMPERATURE: 97.2 F | OXYGEN SATURATION: 97 %

## 2023-05-15 DIAGNOSIS — E11.22 TYPE 2 DIABETES MELLITUS WITH STAGE 3A CHRONIC KIDNEY DISEASE, WITHOUT LONG-TERM CURRENT USE OF INSULIN (HCC): Primary | ICD-10-CM

## 2023-05-15 DIAGNOSIS — D47.3 ESSENTIAL THROMBOCYTHEMIA (HCC): ICD-10-CM

## 2023-05-15 DIAGNOSIS — N18.31 STAGE 3A CHRONIC KIDNEY DISEASE (HCC): ICD-10-CM

## 2023-05-15 DIAGNOSIS — E78.00 HYPERCHOLESTEROLEMIA: ICD-10-CM

## 2023-05-15 DIAGNOSIS — I10 BENIGN ESSENTIAL HTN: ICD-10-CM

## 2023-05-15 DIAGNOSIS — M10.09 ACUTE IDIOPATHIC GOUT OF MULTIPLE SITES: ICD-10-CM

## 2023-05-15 DIAGNOSIS — R79.89 LOW VITAMIN D LEVEL: ICD-10-CM

## 2023-05-15 DIAGNOSIS — E87.5 HYPERKALEMIA: ICD-10-CM

## 2023-05-15 DIAGNOSIS — R05.2 SUBACUTE COUGH: ICD-10-CM

## 2023-05-15 DIAGNOSIS — E66.01 OBESITY, MORBID (HCC): ICD-10-CM

## 2023-05-15 DIAGNOSIS — N18.31 TYPE 2 DIABETES MELLITUS WITH STAGE 3A CHRONIC KIDNEY DISEASE, WITHOUT LONG-TERM CURRENT USE OF INSULIN (HCC): Primary | ICD-10-CM

## 2023-05-15 RX ORDER — SODIUM POLYSTYRENE SULFONATE 4.1 MEQ/G
15 POWDER, FOR SUSPENSION ORAL; RECTAL
Qty: 453.6 G | Refills: 0 | Status: SHIPPED | OUTPATIENT
Start: 2023-05-15

## 2023-05-15 RX ORDER — ALBUTEROL SULFATE 90 UG/1
2 AEROSOL, METERED RESPIRATORY (INHALATION) EVERY 6 HOURS PRN
Qty: 18 G | Refills: 1 | Status: SHIPPED | OUTPATIENT
Start: 2023-05-15

## 2023-05-15 RX ORDER — ALLOPURINOL 100 MG/1
100 TABLET ORAL DAILY
Qty: 90 TABLET | Refills: 3 | Status: SHIPPED | OUTPATIENT
Start: 2023-05-15

## 2023-05-15 NOTE — ASSESSMENT & PLAN NOTE
Platelet count is 46,501 and will refer patient to hematology for further work-up and management  Continue with Plavix for now

## 2023-05-15 NOTE — PROGRESS NOTES
Assessment/Plan:    Type 2 diabetes mellitus with stage 3a chronic kidney disease, without long-term current use of insulin ([facility])    Lab Results   Component Value Date    HGBA1C 6 0 (H) 05/01/2023   Continue with diabetic diet  Well-controlled    Benign essential HTN  Well-controlled on present regimen  Essential thrombocythemia ([facility] [address] )  Platelet count is 57,391 and will refer patient to hematology for further work-up and management  Continue with Plavix for now  Obesity, morbid ([facility] [address] )  Advise for low caloric diet and exercise    Hyperkalemia  Advised for low potassium diet  Is multifactorial underlying CKD and also losartan  Patient also complaining of constipation and started him on Kayexalate 15 mg every other day and will check BMP next week  If still elevated probably will consider discontinuing losartan  Diagnoses and all orders for this visit:    Type 2 diabetes mellitus with stage 3a chronic kidney disease, without long-term current use of insulin ([facility])  -     Comprehensive metabolic panel; Future    Benign essential HTN    Essential thrombocythemia ([facility] [address] )  -     Ambulatory Referral to Hematology / Oncology; Future  -     CBC; Future  -     CBC and Platelet; Future    Stage 3a chronic kidney disease (HCC)  -     Comprehensive metabolic panel; Future    Obesity, morbid ([facility])    Hyperkalemia  -     sodium polystyrene (KAYEXALATE) powder; Take 15 g by mouth daily after breakfast  -     Basic metabolic panel; Future    Hypercholesterolemia    Low vitamin D level  -     Comprehensive metabolic panel; Future    Subacute cough  -     albuterol (Ventolin HFA) 90 mcg/act inhaler; Inhale 2 puffs every 6 (six) hours as needed for wheezing    Acute idiopathic gout of multiple sites  -     allopurinol (ZYLOPRIM) 100 mg tablet; Take 1 tablet (100 mg total) by mouth daily    Other orders  -     Thiamine HCl (VITAMIN B1 PO); Take 1 tablet by mouth in the morning  -     Cyanocobalamin (VITAMIN B12 PO);  Take 1 tablet by mouth in the morning          BMI Counseling: Body mass index is 32 36 kg/m²  The BMI is above normal  Nutrition recommendations include decreasing portion sizes, encouraging healthy choices of fruits and vegetables, decreasing fast food intake, consuming healthier snacks and limiting drinks that contain sugar  Exercise recommendations include moderate physical activity 150 minutes/week and exercising 3-5 times per week  No pharmacotherapy was ordered  Rationale for BMI follow-up plan is due to patient being overweight or obese  Subjective:          Patient ID: Michelle Jon is a 76 y o  male  Patient is here for follow-up  Still complaining of sinus congestion stuffy nose and also have blood work would like to discuss results      The following portions of the patient's history were reviewed and updated as appropriate: allergies, current medications, past family history, past medical history, past social history, past surgical history and problem list     Review of Systems   Constitutional: Negative for fatigue and fever  HENT: Positive for congestion and postnasal drip  Negative for ear discharge, ear pain, sinus pressure, sore throat, tinnitus and trouble swallowing  Eyes: Negative for discharge, itching and visual disturbance  Respiratory: Positive for cough  Negative for shortness of breath  Cardiovascular: Negative for chest pain and palpitations  Gastrointestinal: Negative for abdominal pain, diarrhea, nausea and vomiting  Endocrine: Negative for cold intolerance and polyuria  Genitourinary: Negative for difficulty urinating, dysuria and urgency  Musculoskeletal: Negative for arthralgias and neck pain  Skin: Negative for rash  Allergic/Immunologic: Negative for environmental allergies  Neurological: Negative for dizziness, weakness and headaches  Psychiatric/Behavioral: Negative for agitation and behavioral problems  The patient is not nervous/anxious  Past Medical History:   Diagnosis Date   • Actinic keratosis     RESOLVED: [id number]   • Adolescent idiopathic scoliosis of thoracolumbar region 04/12/2018   • Allergic    • Anxiety    • Arthritis    • Basal cell carcinoma of back     RESOLVED: [id number]   • Basal cell carcinoma of skin of upper extremity, including shoulder    • BCC (basal cell carcinoma), trunk    • BCE (basal cell epithelioma), trunk     RESOLVED: [id number]   • Benign neoplasm of skin of face    • Coronary artery disease involving native coronary artery of native heart without angina pectoris 10/16/2018   • Depression 10/06/2015   • Gastroesophageal reflux disease without esophagitis 04/21/2017   • GERD (gastroesophageal reflux disease)    • Hypertension    • Myocardial infarction [facility])    • Neoplasm of uncertain behavior of skin    • Nonmelanoma skin cancer     LAST ASSESSED: [id number]   • Scoliosis    • Seborrheic keratosis     RESOLVED: [id number]   • Stroke [facility])          Current Outpatient Medications:   •  albuterol (Ventolin HFA) 90 mcg/act inhaler, Inhale 2 puffs every 6 (six) hours as needed for wheezing, Disp: 18 g, Rfl: 1  •  allopurinol (ZYLOPRIM) 100 mg tablet, Take 1 tablet (100 mg total) by mouth daily, Disp: 90 tablet, Rfl: 3  •  carvedilol (COREG) 25 mg tablet, Take 1 tablet (25 mg total) by mouth 2 (two) times a day with meals, Disp: 180 tablet, Rfl: 1  •  cholecalciferol (VITAMIN D3) 1,000 units tablet, Take 1,000 Units by mouth daily, Disp: , Rfl:   •  clopidogrel (PLAVIX) 75 mg tablet, Take 1 tablet (75 mg total) by mouth daily, Disp: 90 tablet, Rfl: 1  •  Cyanocobalamin (VITAMIN B12 PO), Take 1 tablet by mouth in the morning, Disp: , Rfl:   •  losartan (COZAAR) 50 mg tablet, Take 1 tablet (50 mg total) by mouth daily, Disp: 90 tablet, Rfl: 1  •  sodium polystyrene (KAYEXALATE) powder, Take 15 g by mouth daily after breakfast, Disp: 453 6 g, Rfl: 0  •  Thiamine HCl (VITAMIN B1 PO), Take 1 tablet by mouth in the morning, "Disp: , Rfl:   •  colchicine (COLCRYS) 0 6 mg tablet, Take 1 tablet (0 6 mg total) by mouth daily (Patient not taking: Reported on 5/15/2023), Disp: 30 tablet, Rfl: 1  •  ergocalciferol (VITAMIN D2) 50,000 units, Take 1 capsule (50,000 Units total) by mouth once a week (Patient not taking: Reported on 5/15/2023), Disp: 12 capsule, Rfl: 0  •  rosuvastatin (CRESTOR) 10 MG tablet, Take 1 tablet (10 mg total) by mouth daily at bedtime (Patient not taking: Reported on 11/14/2022), Disp: 90 tablet, Rfl: 1  •  Turmeric 500 MG CAPS, Take 1 capsule by mouth in the morning, Disp: , Rfl:     Allergies   Allergen Reactions   • Other      [place] trees       Social History   Past Surgical History:   Procedure Laterality Date   • ANAL FISTULOTOMY  06/13/2011    DR SANCHEZ   • CORONARY ARTERY BYPASS GRAFT  05/17/2005    TRIPLE; DR Jonah Vargas  10/2009    DR MARRERO   • JOINT REPLACEMENT      shoulder replacement   • KNEE SURGERY     • SHOULDER SURGERY  06/18/2015    JOINT REPLACEMENT; DR Cassie Arrieta   • SPINE SURGERY  12/2020    spine fusion     Family History   Problem Relation Age of Onset   • Other Mother         BRAIN TUMOR   • No Known Problems Father    • Vascular Disease Brother    • Cancer Brother    • Dementia Brother    • Heart disease Brother    • COPD Brother    • Prostate cancer Brother    • Substance Abuse Brother    • Drug abuse Brother        Objective:  /78 (BP Location: Left arm, Patient Position: Sitting, Cuff Size: Standard)   Pulse 66   Temp (!) 97 2 °F (36 2 °C) (Temporal)   Ht 5' 3\" (1 6 m)   Wt 82 9 kg (182 lb 11 2 oz)   SpO2 97%   BMI 32 36 kg/m²   Body mass index is 32 36 kg/m²  Physical Exam  Constitutional:       Appearance: He is well-developed  He is not ill-appearing or diaphoretic  HENT:      Head: Normocephalic  Right Ear: Ear canal and external ear normal  There is no impacted cerumen        Left Ear: Ear canal and external ear " normal       Nose: No rhinorrhea  Mouth/Throat:      Pharynx: No posterior oropharyngeal erythema  Eyes:      General: No scleral icterus  Pupils: Pupils are equal, round, and reactive to light  Neck:      Thyroid: No thyromegaly  Trachea: No tracheal deviation  Cardiovascular:      Rate and Rhythm: Normal rate and regular rhythm  Heart sounds: Normal heart sounds  Comments: Trace bilateral lower extremity edema present  Bilateral posterior tibial and dorsalis pedis pulses are 2+  Pulmonary:      Effort: Pulmonary effort is normal  No respiratory distress  Breath sounds: Normal breath sounds  Chest:      Chest wall: No tenderness  Abdominal:      General: Bowel sounds are normal       Palpations: Abdomen is soft  There is no mass  Tenderness: There is no abdominal tenderness  Musculoskeletal:         General: Normal range of motion  Cervical back: Normal range of motion and neck supple  Right lower leg: Edema present  Left lower leg: Edema present  Lymphadenopathy:      Cervical: No cervical adenopathy  Skin:     General: Skin is warm  Findings: No erythema or rash  Neurological:      Mental Status: He is alert and oriented to person, place, and time  Cranial Nerves: No cranial nerve deficit     Psychiatric:         Mood and Affect: Mood normal          Behavior: Behavior normal

## 2023-05-15 NOTE — ASSESSMENT & PLAN NOTE
Lab Results   Component Value Date    HGBA1C 6 0 (H) 05/01/2023   Continue with diabetic diet    Well-controlled

## 2023-05-15 NOTE — ASSESSMENT & PLAN NOTE
Advised for low potassium diet  Is multifactorial underlying CKD and also losartan  Patient also complaining of constipation and started him on Kayexalate 15 mg every other day and will check BMP next week  If still elevated probably will consider discontinuing losartan

## 2023-05-15 NOTE — PATIENT INSTRUCTIONS
Type 2 Diabetes Management for Adults   AMBULATORY CARE:   Type 2 diabetes  is a disease that affects how your body uses glucose (sugar)  Either your body cannot make enough insulin, or it cannot use the insulin correctly  It is important to keep diabetes controlled to prevent damage to your heart, blood vessels, and other organs  Management will help you feel well and enjoy your daily activities  Your diabetes care team providers can help you make a plan to fit diabetes care into your schedule  Your plan can change over time to fit your needs and your family's needs  Have someone call your local emergency number (911 in the [address]) if:   • You cannot be woken  • You have signs of diabetic ketoacidosis:     ? confusion, fatigue    ? vomiting    ? rapid heartbeat    ? fruity smelling breath    ? extreme thirst    ? dry mouth and skin    • You have any of the following signs of a heart attack:      ? Squeezing, pressure, or pain in your chest    ? You may  also have any of the following:     - Discomfort or pain in your back, neck, jaw, stomach, or arm    - Shortness of breath    - Nausea or vomiting    - Lightheadedness or a sudden cold sweat    • You have any of the following signs of a stroke:      ? Numbness or drooping on one side of your face     ? Weakness in an arm or leg    ? Confusion or difficulty speaking    ? Dizziness, a severe headache, or vision loss    Call your doctor or diabetes care team provider if:   • You have a sore or wound that will not heal     • You have a change in the amount you urinate  • Your blood sugar levels are higher than your target goals  • You often have lower blood sugar levels than your target goals  • Your skin is red, dry, warm, or swollen  • You have trouble coping with diabetes, or you feel anxious or depressed  • You have questions or concerns about your condition or care      What you need to know about high blood sugar levels:  High blood sugar levels may not cause any symptoms  You may feel more thirsty or urinate more often than usual  Over time, high blood sugar levels can damage your nerves, blood vessels, tissues, and organs  The following can increase your blood sugar levels:  • Large meals or large amounts of carbohydrates at one time    • Less physical activity    • Stress    • Illness    • A lower dose of diabetes medicine or insulin, or a late dose    What you need to know about low blood sugar levels:  Symptoms include feeling shaky, dizzy, irritable, or confused  You can prevent symptoms by keeping your blood sugar levels from going too low  • Treat a low blood sugar level right away:      ? Drink 4 ounces of juice or have 1 tube of glucose gel  ? Check your blood sugar level again 10 to 15 minutes later  ? When the level goes back to normal, eat a meal or snack to prevent another decrease  • Keep glucose gel, raisins, or hard candy with you at all times to treat a low blood sugar level  • Your blood sugar level can get too low if you take diabetes medicine or insulin and do not eat enough food  • If you use insulin, check your blood sugar level before you exercise  ? If your blood sugar level is below 100 mg/dL, eat 4 crackers or 2 ounces of raisins, or drink 4 ounces of juice  ? Check your level every 30 minutes if you exercise longer than 1 hour  ? You may need a snack during or after exercise  What you can do to manage your blood sugar levels:   • Check your blood sugar levels as directed and as needed  Several items are available to use to check your levels  You may need to check by testing a drop of blood in a glucose monitor  You may instead be given a continuous glucose monitoring (CGM) device  The device is worn at all times  The CGM checks your blood sugar level every 5 minutes  It sends results to an electronic device such as a smart phone  A CGM can be used with or without an insulin pump   You and your diabetes care team providers will decide on the best method for you  The goal for blood sugar levels before meals  is between 80 and 130 mg/dL and 2 hours after eating  is lower than 180 mg/dL  • Make healthy food choices  Work with a dietitian to develop a meal plan that works for you and your schedule  A dietitian can help you learn how to eat the right amount of carbohydrates during your meals and snacks  Carbohydrates can raise your blood sugar level if you eat too many at one time  Examples of foods that contain carbohydrates are breads, cereals, rice, pasta, and sweets  • Eat high-fiber foods as directed  Fiber helps improve blood sugar levels  Fiber also lowers your risk for heart disease and other problems diabetes can cause  Examples of high-fiber foods include vegetables, whole-grain bread, and beans such as deleon beans  Your dietitian can tell you how much fiber to have each day  • Get regular physical activity  Physical activity can help you get to your target blood sugar level goal and manage your weight  Get at least 150 minutes of moderate to vigorous aerobic physical activity each week  Do not miss more than 2 days in a row  Do not sit longer than 30 minutes at a time  Your healthcare provider can help you create an activity plan  The plan can include the best activities for you and can help you build your strength and endurance  • Maintain a healthy weight  Ask your team what a healthy weight is for you  A healthy weight can help you control diabetes and prevent heart disease  Ask your team to help you create a weight loss plan, if needed  Weight loss can help make a difference in managing diabetes  Your team will help you set a weight-loss goal, such as 10 to 15 pounds, or 5% of your extra weight  Together you and your team can set manageable weight loss goals  • Take your diabetes medicine or insulin as directed    You may need diabetes medicine, insulin, or both to help control your blood sugar levels  Your healthcare provider will teach you how and when to take your diabetes medicine or insulin  You will also be taught about side effects oral diabetes medicine can cause  Insulin may be injected or given through a pump or pen  You and your providers will decide on the best method for you:    ? An insulin pump  is an implanted device that gives your insulin 24 hours a day  An insulin pump prevents the need for multiple insulin injections in a day  ? An insulin pen  is a device prefilled with the right amount of insulin  ? You and your family members will be taught how to draw up and give insulin  if this is the best method for you  Your providers will also teach you how to dispose of needles and syringes  ? You will learn how much insulin you need  and when to give it  You will be taught when not to give insulin  You will also be taught what to do if your blood sugar level drops too low  This may happen if you take insulin and do not eat the right amount of carbohydrates  More ways to manage type 2 diabetes:   • Wear medical alert identification  Wear medical alert jewelry or carry a card that says you have diabetes  Ask your provider where to get these items  • Do not smoke  Nicotine and other chemicals in cigarettes and cigars can cause lung and blood vessel damage  It also makes it more difficult to manage your diabetes  Ask your provider for information if you currently smoke and need help to quit  Do not use e-cigarettes or smokeless tobacco in place of cigarettes or to help you quit  They still contain nicotine  • Check your feet each day for cuts, scratches, calluses, or other wounds  Look for redness and swelling, and feel for warmth  Wear shoes that fit well  Check your shoes for rocks or other objects that can hurt your feet  Do not walk barefoot or wear shoes without socks   Wear cotton socks to help keep your feet dry  • Ask about vaccines you may need  You have a higher risk for serious illness if you get the flu, pneumonia, COVID-19, or hepatitis  Ask your provider if you should get vaccines to prevent these or other diseases, and when to get the vaccines  • Talk to your provider if you become stressed about diabetes care  Sometimes being able to fit diabetes care into your life can cause increased stress  The stress can cause you not to take care of yourself properly  Your care team providers can help by offering tips about self-care  Your providers may suggest you talk to a mental health provider who can listen and offer help with self-care issues  • Have your A1c checked as directed  Your provider may check your A1c every 3 months, or 2 times each year if your diabetes is controlled  An A1c test shows the average amount of sugar in your blood over the past 2 to 3 months  Your provider will tell you what your A1c level should be  • Have screening tests as directed  Your provider may recommend screening for complications of diabetes and other conditions that may develop  Some screenings may begin right away and some may happen within the first 5 years of diagnosis:    ? Examples of diabetes complications  include kidney problems, high cholesterol, high blood pressure, blood vessel problems, eye problems, and sleep apnea  ? You may be screened for a low vitamin B level  if you take oral diabetes medicine for a long time  ? Women of childbearing years may be screened  for polycystic ovarian syndrome (PCOS)  Follow up with your doctor or diabetes care team providers as directed: You may need to have blood tests done before your follow-up visit  The test results will show if changes need to be made in your treatment or self-care  Talk to your provider if you cannot afford your medicine  Write down your questions so you remember to ask them during your visits    © Copyright [employer] 2022 Information is for End User's use only and may not be sold, redistributed or otherwise used for commercial purposes  The above information is an  only  It is not intended as medical advice for individual conditions or treatments  Talk to your doctor, nurse or pharmacist before following any medical regimen to see if it is safe and effective for you

## 2023-05-24 LAB
ALBUMIN SERPL-MCNC: 4.5 G/DL (ref 3.7–4.7)
ALBUMIN/GLOB SERPL: 2.1 {RATIO} (ref 1.2–2.2)
ALP SERPL-CCNC: 51 IU/L (ref 44–121)
ALT SERPL-CCNC: 15 IU/L (ref 0–44)
AST SERPL-CCNC: 18 IU/L (ref 0–40)
BASOPHILS # BLD AUTO: 0 X10E3/UL (ref 0–0.2)
BASOPHILS NFR BLD AUTO: 0 %
BILIRUB SERPL-MCNC: 0.3 MG/DL (ref 0–1.2)
BUN SERPL-MCNC: 20 MG/DL (ref 8–27)
BUN/CREAT SERPL: 14 (ref 10–24)
CALCIUM SERPL-MCNC: 9.7 MG/DL (ref 8.6–10.2)
CHLORIDE SERPL-SCNC: 103 MMOL/L (ref 96–106)
CO2 SERPL-SCNC: 22 MMOL/L (ref 20–29)
CREAT SERPL-MCNC: 1.38 MG/DL (ref 0.76–1.27)
EGFR: 52 ML/MIN/1.73
EOSINOPHIL # BLD AUTO: 0.4 X10E3/UL (ref 0–0.4)
EOSINOPHIL NFR BLD AUTO: 4 %
ERYTHROCYTE [DISTWIDTH] IN BLOOD BY AUTOMATED COUNT: 14.5 % (ref 11.6–15.4)
GLOBULIN SER-MCNC: 2.1 G/DL (ref 1.5–4.5)
GLUCOSE SERPL-MCNC: 109 MG/DL (ref 70–99)
HCT VFR BLD AUTO: 43.2 % (ref 37.5–51)
HGB BLD-MCNC: 14.8 G/DL (ref 13–17.7)
IMM GRANULOCYTES # BLD: 0.1 X10E3/UL (ref 0–0.1)
IMM GRANULOCYTES NFR BLD: 1 %
LYMPHOCYTES # BLD AUTO: 2.3 X10E3/UL (ref 0.7–3.1)
LYMPHOCYTES NFR BLD AUTO: 23 %
MCH RBC QN AUTO: 30.5 PG (ref 26.6–33)
MCHC RBC AUTO-ENTMCNC: 34.3 G/DL (ref 31.5–35.7)
MCV RBC AUTO: 89 FL (ref 79–97)
MONOCYTES # BLD AUTO: 0.8 X10E3/UL (ref 0.1–0.9)
MONOCYTES NFR BLD AUTO: 7 %
NEUTROPHILS # BLD AUTO: 6.8 X10E3/UL (ref 1.4–7)
NEUTROPHILS NFR BLD AUTO: 65 %
PLATELET # BLD AUTO: 952 X10E3/UL (ref 150–450)
POTASSIUM SERPL-SCNC: 5.6 MMOL/L (ref 3.5–5.2)
PROT SERPL-MCNC: 6.6 G/DL (ref 6–8.5)
RBC # BLD AUTO: 4.85 X10E6/UL (ref 4.14–5.8)
SODIUM SERPL-SCNC: 140 MMOL/L (ref 134–144)
WBC # BLD AUTO: 10.4 X10E3/UL (ref 3.4–10.8)

## 2023-06-05 ENCOUNTER — CONSULT (OUTPATIENT)
Dept: HEMATOLOGY ONCOLOGY | Facility: CLINIC | Age: 78
End: 2023-06-05
Payer: COMMERCIAL

## 2023-06-05 VITALS
DIASTOLIC BLOOD PRESSURE: 70 MMHG | BODY MASS INDEX: 32.6 KG/M2 | OXYGEN SATURATION: 96 % | WEIGHT: 184 LBS | TEMPERATURE: 98.4 F | HEART RATE: 51 BPM | HEIGHT: 63 IN | SYSTOLIC BLOOD PRESSURE: 140 MMHG

## 2023-06-05 DIAGNOSIS — D47.3 ESSENTIAL THROMBOCYTHEMIA (HCC): ICD-10-CM

## 2023-06-05 DIAGNOSIS — N18.31 STAGE 3A CHRONIC KIDNEY DISEASE (HCC): ICD-10-CM

## 2023-06-05 DIAGNOSIS — M10.9 ACUTE GOUTY ARTHRITIS: Primary | ICD-10-CM

## 2023-06-05 PROCEDURE — 99204 OFFICE O/P NEW MOD 45 MIN: CPT | Performed by: NURSE PRACTITIONER

## 2023-06-05 NOTE — PROGRESS NOTES
[facility] [place]  [address] [facility] [id number] [phone]  Hematology Ambulatory Consult  Bridget Parker, 1945, [id number]  6/5/2023      Assessment and Plan   1  Essential thrombocythemia ([facility] [address] )  2  Acute gouty arthritis  3  Stage [facility]   Patient is a 58-year-old male with a history of anxiety, arthritis, CAD, CKD, depression, GERD, hypertension, MI, hyperlipidemia, and gout who was referred to us for further evaluation of thrombocytosis  Patient has had an elevated platelet count since at least April 2019 ranging between 409-727  Most recent platelets 328 otherwise normal CBC and differential   Per chart review in 2015 platelet count was normal at 339  He had a CABG in 2009 and follows with cardiology  In January 2021 patient was hospitalized with dizziness nausea and was found to have a cerebellar stroke  He was discharged with aspirin and clopidogrel  Aspirin was eventually discontinued he continues on Plavix  Overall patient is active and able to function without restriction  We discussed etiology of thrombocytosis including inflammation, nutritional deficiencies, versus an underlying mutation such as JAK2  We will obtain blood work and plan to see him in 3 weeks to review  Patient verbalized understanding and is in agreement with plan  - Ambulatory Referral to Hematology / Oncology    Barrier(s) to care: None  The patient is  able to self care      [address] [place] [address]    Subjective     Chief Complaint   Patient presents with   • Consult       Referring provider    MD Ramo Pascual  [facility]  [place],  [address]    History of present illness:  Patient is a 58-year-old male with a history of anxiety, arthritis, CAD, CKD, depression, GERD, hypertension, MI, hyperlipidemia, and gout who was referred to us for further evaluation of thrombocytosis  06/05/23: Clinically stable    Review of Systems   Constitutional: Negative for activity change, appetite change, fatigue, fever and unexpected weight change  Respiratory: Negative for cough and shortness of breath  Cardiovascular: Negative for chest pain and leg swelling  Gastrointestinal: Negative for abdominal pain, constipation, diarrhea and nausea  Endocrine: Negative for cold intolerance and heat intolerance  Musculoskeletal: Negative for arthralgias and myalgias  Skin: Negative  Neurological: Negative for dizziness, weakness and headaches  Hematological: Negative for adenopathy  Does not bruise/bleed easily  Past Medical History:   Diagnosis Date   • Actinic keratosis     RESOLVED: [id number]   • Adolescent idiopathic scoliosis of thoracolumbar region 04/12/2018   • Allergic    • Anxiety    • Arthritis    • Basal cell carcinoma of back     RESOLVED: [id number]   • Basal cell carcinoma of skin of upper extremity, including shoulder    • BCC (basal cell carcinoma), trunk    • BCE (basal cell epithelioma), trunk     RESOLVED: [id number]   • Benign neoplasm of skin of face    • Coronary artery disease involving native coronary artery of native heart without angina pectoris 10/16/2018   • Depression 10/06/2015   • Gastroesophageal reflux disease without esophagitis 04/21/2017   • GERD (gastroesophageal reflux disease)    • Hypertension    • Myocardial infarction [facility])    • Neoplasm of uncertain behavior of skin    • Nonmelanoma skin cancer     LAST ASSESSED: [id number]   • Scoliosis    • Seborrheic keratosis     RESOLVED: [id number]   • Stroke [facility])      Past Surgical History:   Procedure Laterality Date   • ANAL FISTULOTOMY  06/13/2011    DR SANCHEZ   • CORONARY ARTERY BYPASS GRAFT  05/17/2005    TRIPLE; DR Jonah Vargas  10/2009    DR MARRERO   • JOINT REPLACEMENT      shoulder replacement   • KNEE SURGERY     • SHOULDER SURGERY  06/18/2015    JOINT REPLACEMENT; DR Jasmina Smith   • SPINE SURGERY  12/2020    spine fusion     Family History   Problem Relation Age of Onset   • Other Mother         BRAIN TUMOR   • No Known Problems Father    • Vascular Disease Brother    • Cancer Brother    • Dementia Brother    • Heart disease Brother    • COPD Brother    • Prostate cancer Brother    • Substance Abuse Brother    • Drug abuse Brother      Social History     Socioeconomic History   • Marital status: /Civil Union     Spouse name: None   • Number of children: None   • Years of education: None   • Highest education level: None   Occupational History   • None   Tobacco Use   • Smoking status: Light Smoker     Types: Cigars   • Smokeless tobacco: Never   • Tobacco comments:     one cigar once in awhile   Vaping Use   • Vaping Use: Never used   Substance and Sexual Activity   • Alcohol use:  Yes     Alcohol/week: 1 0 standard drink of alcohol     Types: 1 Glasses of wine per week     Comment: occasionally   • Drug use: No   • Sexual activity: Not Currently     Partners: Female     Birth control/protection: Male Sterilization   Other Topics Concern   • None   Social History Narrative   • None     Social Determinants of Health     Financial Resource Strain: Not on file   Food Insecurity: Not on file   Transportation Needs: Not on file   Physical Activity: Not on file   Stress: Not on file   Social Connections: Not on file   Intimate Partner Violence: Not on file   Housing Stability: Not on file         Current Outpatient Medications:   •  albuterol (Ventolin HFA) 90 mcg/act inhaler, Inhale 2 puffs every 6 (six) hours as needed for wheezing, Disp: 18 g, Rfl: 1  •  allopurinol (ZYLOPRIM) 100 mg tablet, Take 1 tablet (100 mg total) by mouth daily, Disp: 90 tablet, Rfl: 3  •  carvedilol (COREG) 25 mg tablet, Take 1 tablet (25 mg total) by mouth 2 (two) times a day with meals, Disp: 180 tablet, Rfl: 1  •  cholecalciferol "(VITAMIN D3) 1,000 units tablet, Take 1,000 Units by mouth daily, Disp: , Rfl:   •  clopidogrel (PLAVIX) 75 mg tablet, Take 1 tablet (75 mg total) by mouth daily, Disp: 90 tablet, Rfl: 1  •  losartan (COZAAR) 50 mg tablet, Take 1 tablet (50 mg total) by mouth daily, Disp: 90 tablet, Rfl: 1  •  sodium polystyrene (KAYEXALATE) powder, Take 15 g by mouth daily after breakfast, Disp: 453 6 g, Rfl: 0  •  Thiamine HCl (VITAMIN B1 PO), Take 1 tablet by mouth in the morning, Disp: , Rfl:   •  Turmeric 500 MG CAPS, Take 1 capsule by mouth in the morning, Disp: , Rfl:   •  colchicine (COLCRYS) 0 6 mg tablet, Take 1 tablet (0 6 mg total) by mouth daily (Patient not taking: Reported on 5/15/2023), Disp: 30 tablet, Rfl: 1  •  Cyanocobalamin (VITAMIN B12 PO), Take 1 tablet by mouth in the morning (Patient not taking: Reported on 6/5/2023), Disp: , Rfl:   •  ergocalciferol (VITAMIN D2) 50,000 units, Take 1 capsule (50,000 Units total) by mouth once a week (Patient not taking: Reported on 5/15/2023), Disp: 12 capsule, Rfl: 0  •  rosuvastatin (CRESTOR) 10 MG tablet, Take 1 tablet (10 mg total) by mouth daily at bedtime (Patient not taking: Reported on 11/14/2022), Disp: 90 tablet, Rfl: 1  Allergies   Allergen Reactions   • Other      [place]       Objective   /70 (BP Location: Left arm, Patient Position: Sitting, Cuff Size: Large)   Pulse (!) 51   Temp 98 4 °F (36 9 °C)   Ht 5' 3\" (1 6 m)   Wt 83 5 kg (184 lb)   SpO2 96%   BMI 32 59 kg/m²   Physical Exam  Vitals reviewed  Constitutional:       Appearance: Normal appearance  He is well-developed  HENT:      Head: Normocephalic and atraumatic  Eyes:      Pupils: Pupils are equal, round, and reactive to light  Pulmonary:      Effort: Pulmonary effort is normal  No respiratory distress  Musculoskeletal:         General: Normal range of motion  Cervical back: Normal range of motion  Lymphadenopathy:      Cervical: No cervical adenopathy     Skin:     " General: Skin is dry  Neurological:      Mental Status: He is alert and oriented to person, place, and time     Psychiatric:         Behavior: Behavior normal        Result Review  Labs:  Orders Only on 05/23/2023   Component Date Value Ref Range Status   • White Blood Cell Count 05/23/2023 10 4  3 4 - 10 8 x10E3/uL Final   • Red Blood Cell Count 05/23/2023 4 85  4 14 - 5 80 x10E6/uL Final   • Hemoglobin 05/23/2023 14 8  13 0 - 17 7 g/dL Final   • HCT 05/23/2023 43 2  37 5 - 51 0 % Final   • MCV 05/23/2023 89  79 - 97 fL Final   • MCH 05/23/2023 30 5  26 6 - 33 0 pg Final   • MCHC 05/23/2023 34 3  31 5 - 35 7 g/dL Final   • RDW 05/23/2023 14 5  11 6 - 15 4 % Final   • Platelet Count 39/20/1332 952 (HH)  150 - 450 x10E3/uL Final   • Neutrophils 05/23/2023 65  Not Estab  % Final   • Lymphocytes 05/23/2023 23  Not Estab  % Final   • Monocytes 05/23/2023 7  Not Estab  % Final   • Eosinophils 05/23/2023 4  Not Estab  % Final   • Basophils PCT 05/23/2023 0  Not Estab  % Final   • Neutrophils (Absolute) 05/23/2023 6 8  1 4 - 7 0 x10E3/uL Final   • Lymphocytes (Absolute) 05/23/2023 2 3  0 7 - 3 1 x10E3/uL Final   • Monocytes (Absolute) 05/23/2023 0 8  0 1 - 0 9 x10E3/uL Final   • Eosinophils (Absolute) 05/23/2023 0 4  0 0 - 0 4 x10E3/uL Final   • Basophils ABS 05/23/2023 0 0  0 0 - 0 2 x10E3/uL Final   • Immature Granulocytes 05/23/2023 1  Not Estab  % Final   • Immature Granulocytes (Absolute) 05/23/2023 0 1  0 0 - 0 1 x10E3/uL Final   • Glucose, Random 05/23/2023 109 (H)  70 - 99 mg/dL Final   • BUN 05/23/2023 20  8 - 27 mg/dL Final   • Creatinine 05/23/2023 1 38 (H)  0 76 - 1 27 mg/dL Final   • eGFR 05/23/2023 52 (L)  >59 mL/min/1 73 Final   • SL AMB BUN/CREATININE RATIO 05/23/2023 14  10 - 24 Final   • Sodium 05/23/2023 140  134 - 144 mmol/L Final   • Potassium 05/23/2023 5 6 (H)  3 5 - 5 2 mmol/L Final   • Chloride 05/23/2023 103  96 - 106 mmol/L Final   • CO2 05/23/2023 22  20 - 29 mmol/L Final   • CALCIUM 05/23/2023 9 7  8 6 - 10 2 mg/dL Final   • Protein, Total 05/23/2023 6 6  6 0 - 8 5 g/dL Final   • Albumin 05/23/2023 4 5  3 7 - 4 7 g/dL Final   • Globulin, Total 05/23/2023 2 1  1 5 - 4 5 g/dL Final   • Albumin/Globulin Ratio 05/23/2023 2 1  1 2 - 2 2 Final   • TOTAL BILIRUBIN 05/23/2023 0 3  0 0 - 1 2 mg/dL Final   • Alk Phos Isoenzymes 05/23/2023 51  44 - 121 IU/L Final   • AST 05/23/2023 18  0 - 40 IU/L Final   • ALT 05/23/2023 15  0 - 44 IU/L Final     Please note: This report has been generated by a voice recognition software system  Therefore there may be syntax, spelling, and/or grammatical errors  Please call if you have any questions

## 2023-06-19 LAB
ALBUMIN SERPL-MCNC: 4.7 G/DL (ref 3.7–4.7)
ALBUMIN/GLOB SERPL: 3.1 {RATIO} (ref 1.2–2.2)
ALP SERPL-CCNC: 47 IU/L (ref 44–121)
ALT SERPL-CCNC: 13 IU/L (ref 0–44)
AST SERPL-CCNC: 18 IU/L (ref 0–40)
BASOPHILS # BLD AUTO: 0 X10E3/UL (ref 0–0.2)
BASOPHILS NFR BLD AUTO: 0 %
BILIRUB SERPL-MCNC: 0.4 MG/DL (ref 0–1.2)
BUN SERPL-MCNC: 20 MG/DL (ref 8–27)
BUN/CREAT SERPL: 16 (ref 10–24)
CALCIUM SERPL-MCNC: 9.6 MG/DL (ref 8.6–10.2)
CALR %: 38.3 %
CALR AMINO ACID: NORMAL
CALR NUCLEOTIDE: NORMAL
CALR RESULT: NORMAL
CHLORIDE SERPL-SCNC: 105 MMOL/L (ref 96–106)
CITATION REF LAB TEST: NORMAL
CO2 SERPL-SCNC: 22 MMOL/L (ref 20–29)
CREAT SERPL-MCNC: 1.24 MG/DL (ref 0.76–1.27)
E12-15 RESULT: NORMAL
EGFR: 59 ML/MIN/1.73
EOSINOPHIL # BLD AUTO: 0.3 X10E3/UL (ref 0–0.4)
EOSINOPHIL NFR BLD AUTO: 4 %
ERYTHROCYTE [DISTWIDTH] IN BLOOD BY AUTOMATED COUNT: 14.3 % (ref 11.6–15.4)
FOLATE SERPL-MCNC: 5.8 NG/ML
GLOBULIN SER-MCNC: 1.5 G/DL (ref 1.5–4.5)
GLUCOSE SERPL-MCNC: 119 MG/DL (ref 70–99)
HCT VFR BLD AUTO: 44 % (ref 37.5–51)
HGB BLD-MCNC: 14.8 G/DL (ref 13–17.7)
IMM GRANULOCYTES # BLD: 0.1 X10E3/UL (ref 0–0.1)
IMM GRANULOCYTES NFR BLD: 1 %
JAK2 P.V617F BLD/T QL: NORMAL
LAB DIRECTOR NAME PROVIDER: NORMAL
LYMPHOCYTES # BLD AUTO: 2 X10E3/UL (ref 0.7–3.1)
LYMPHOCYTES NFR BLD AUTO: 22 %
MCH RBC QN AUTO: 30.3 PG (ref 26.6–33)
MCHC RBC AUTO-ENTMCNC: 33.6 G/DL (ref 31.5–35.7)
MCV RBC AUTO: 90 FL (ref 79–97)
MONOCYTES # BLD AUTO: 0.6 X10E3/UL (ref 0.1–0.9)
MONOCYTES NFR BLD AUTO: 7 %
MPL RESULT: NORMAL
NEUTROPHILS # BLD AUTO: 5.9 X10E3/UL (ref 1.4–7)
NEUTROPHILS NFR BLD AUTO: 66 %
PLATELET # BLD AUTO: 928 X10E3/UL (ref 150–450)
POTASSIUM SERPL-SCNC: 5.8 MMOL/L (ref 3.5–5.2)
PROT SERPL-MCNC: 6.2 G/DL (ref 6–8.5)
RBC # BLD AUTO: 4.89 X10E6/UL (ref 4.14–5.8)
REF LAB TEST METHOD: NORMAL
SL AMB REFLEX: NORMAL
SODIUM SERPL-SCNC: 141 MMOL/L (ref 134–144)
TEST PERFORMANCE INFO SPEC: NORMAL
VIT B12 SERPL-MCNC: 502 PG/ML (ref 232–1245)
WBC # BLD AUTO: 9.1 X10E3/UL (ref 3.4–10.8)

## 2023-06-28 ENCOUNTER — OFFICE VISIT (OUTPATIENT)
Dept: HEMATOLOGY ONCOLOGY | Facility: CLINIC | Age: 78
End: 2023-06-28
Payer: COMMERCIAL

## 2023-06-28 ENCOUNTER — OFFICE VISIT (OUTPATIENT)
Dept: CARDIOLOGY CLINIC | Facility: CLINIC | Age: 78
End: 2023-06-28
Payer: COMMERCIAL

## 2023-06-28 VITALS
BODY MASS INDEX: 32.78 KG/M2 | WEIGHT: 185 LBS | HEART RATE: 52 BPM | SYSTOLIC BLOOD PRESSURE: 134 MMHG | OXYGEN SATURATION: 98 % | DIASTOLIC BLOOD PRESSURE: 66 MMHG | HEIGHT: 63 IN

## 2023-06-28 VITALS
SYSTOLIC BLOOD PRESSURE: 128 MMHG | TEMPERATURE: 97.8 F | OXYGEN SATURATION: 94 % | BODY MASS INDEX: 32.78 KG/M2 | HEIGHT: 63 IN | HEART RATE: 54 BPM | WEIGHT: 185 LBS | DIASTOLIC BLOOD PRESSURE: 68 MMHG

## 2023-06-28 DIAGNOSIS — D47.3 ESSENTIAL THROMBOCYTHEMIA (HCC): Primary | ICD-10-CM

## 2023-06-28 DIAGNOSIS — I25.10 CORONARY ARTERY DISEASE INVOLVING NATIVE CORONARY ARTERY OF NATIVE HEART WITHOUT ANGINA PECTORIS: ICD-10-CM

## 2023-06-28 DIAGNOSIS — Z01.810 PREOP CARDIOVASCULAR EXAM: Primary | ICD-10-CM

## 2023-06-28 DIAGNOSIS — E78.00 HYPERCHOLESTEROLEMIA: ICD-10-CM

## 2023-06-28 DIAGNOSIS — I73.9 CLAUDICATION (HCC): ICD-10-CM

## 2023-06-28 DIAGNOSIS — D47.3 ESSENTIAL THROMBOCYTHEMIA (HCC): ICD-10-CM

## 2023-06-28 DIAGNOSIS — I65.23 CAROTID ARTERY STENOSIS, ASYMPTOMATIC, BILATERAL: ICD-10-CM

## 2023-06-28 DIAGNOSIS — Z15.89 MONOALLELIC MUTATION OF CALR3 GENE: ICD-10-CM

## 2023-06-28 DIAGNOSIS — I10 BENIGN ESSENTIAL HTN: ICD-10-CM

## 2023-06-28 PROCEDURE — 93000 ELECTROCARDIOGRAM COMPLETE: CPT | Performed by: INTERNAL MEDICINE

## 2023-06-28 PROCEDURE — 99214 OFFICE O/P EST MOD 30 MIN: CPT | Performed by: INTERNAL MEDICINE

## 2023-06-28 RX ORDER — ROSUVASTATIN CALCIUM 10 MG/1
TABLET, COATED ORAL
Qty: 90 TABLET | Refills: 0 | OUTPATIENT
Start: 2023-06-28

## 2023-06-28 NOTE — PROGRESS NOTES
Cardiology Follow Up    Aleksandra Carmel  1945  [id number]  [address] [facility] [address]    1  Preop cardiovascular exam        2  Coronary artery disease involving native coronary artery of native heart without angina pectoris  POCT ECG      3  Carotid artery stenosis, asymptomatic, bilateral  VAS carotid complete study      4  Benign essential HTN        5  Essential thrombocythemia ([address] )        6  Hypercholesterolemia        7  Claudication ([address] )  VAS abdominal aorta/iliacs; complete study          Discussion/Summary: He is here today for preoperative risk assessment for upcoming shoulder surgery  Functional capacity has been stable with no active angina or decompensated heart failure he is moderate risk for surgery  Coronary disease is stable no active angina  Blood pressure has been well controlled lipids have been doing well unfortunately he stopped taking his statin but is agreeable to resume it  He has some claudication symptoms involving the left hip recommend aortoiliac Doppler to rule out stenosis  He is also due for 2-year follow-up on mild carotid stenosis  Interval History:   76year-old gentle with a history of coronary artery disease status post CABG 10 years ago presents for a routine scheduled follow-up visit  He also has a history of hypertension, hyperlipidemia, mild carotid plaque  He has been rather sedentary since her last visit  He suffers from chronic back pain he presents today for preoperative risk assessment for spine surgery  Coronary disease stable with no complaints of angina  Functional capacity is quite good  He is here today for cardiovascular risk assessment  He is able to walk 2 blocks and climb 2 flights of stairs the only thing that bothers him is hip and low back pain on the left    This sounds like it may be claudication from the iliac artery  He denies any complaints of angina  There is been no chest pain, shortness of breath, palpitations, lightheadedness, dizziness, or syncope  There has been a lower extremity edema, PND, orthopnea            Problem List     Myalgia    Polyarthralgia    Fatigue    Low vitamin D level    Lumbar pain    Adolescent idiopathic scoliosis of thoracolumbar region    Abnormal glucose    Benign essential HTN    BPH (benign prostatic hyperplasia)    Depression    Gastroesophageal reflux disease without esophagitis    Hypercholesterolemia    Scoliosis    Screening for malignant neoplasm of prostate    Coronary artery disease involving native coronary artery of native heart without angina pectoris        Past Medical History:   Diagnosis Date   • Actinic keratosis     RESOLVED: [id number]   • Adolescent idiopathic scoliosis of thoracolumbar region 04/12/2018   • Allergic    • Anxiety    • Arthritis    • Basal cell carcinoma of back     RESOLVED: [id number]   • Basal cell carcinoma of skin of upper extremity, including shoulder    • BCC (basal cell carcinoma), trunk    • BCE (basal cell epithelioma), trunk     RESOLVED: [id number]   • Benign neoplasm of skin of face    • Coronary artery disease involving native coronary artery of native heart without angina pectoris 10/16/2018   • Depression 10/06/2015   • Gastroesophageal reflux disease without esophagitis 04/21/2017   • GERD (gastroesophageal reflux disease)    • Hypertension    • Myocardial infarction [facility])    • Neoplasm of uncertain behavior of skin    • Nonmelanoma skin cancer     LAST ASSESSED: [id number]   • Scoliosis    • Seborrheic keratosis     RESOLVED: [id number]   • Stroke [facility])      Social History     Socioeconomic History   • Marital status: /Civil Union     Spouse name: Not on file   • Number of children: Not on file   • Years of education: Not on file   • Highest education level: Not on file   Occupational History   • Not on file Tobacco Use   • Smoking status: Light Smoker     Types: Cigars   • Smokeless tobacco: Never   • Tobacco comments:     one cigar once in awhile   Vaping Use   • Vaping Use: Never used   Substance and Sexual Activity   • Alcohol use: Yes     Alcohol/week: 1 0 standard drink of alcohol     Types: 1 Glasses of wine per week     Comment: occasionally   • Drug use: No   • Sexual activity: Not Currently     Partners: Female     Birth control/protection: Male Sterilization   Other Topics Concern   • Not on file   Social History Narrative   • Not on file     Social Determinants of Health     Financial Resource Strain: Not on file   Food Insecurity: Not on file   Transportation Needs: Not on file   Physical Activity: Not on file   Stress: Not on file   Social Connections: Not on file   Intimate Partner Violence: Not on file   Housing Stability: Not on file      Family History   Problem Relation Age of Onset   • Other Mother         BRAIN TUMOR   • No Known Problems Father    • Vascular Disease Brother    • Cancer Brother    • Dementia Brother    • Heart disease Brother    • COPD Brother    • Prostate cancer Brother    • Substance Abuse Brother    • Drug abuse Brother      Past Surgical History:   Procedure Laterality Date   • ANAL FISTULOTOMY  06/13/2011    DR SANCHEZ   • CORONARY ARTERY BYPASS GRAFT  05/17/2005    TRIPLE; DR Jonah Vargas  10/2009    DR MARRERO   • JOINT REPLACEMENT      shoulder replacement   • KNEE SURGERY     • SHOULDER SURGERY  06/18/2015    JOINT REPLACEMENT; DR Wendy Johnston   • SPINE SURGERY  12/2020    spine fusion       Current Outpatient Medications:   •  allopurinol (ZYLOPRIM) 100 mg tablet, Take 1 tablet (100 mg total) by mouth daily, Disp: 90 tablet, Rfl: 3  •  carvedilol (COREG) 25 mg tablet, Take 1 tablet (25 mg total) by mouth 2 (two) times a day with meals, Disp: 180 tablet, Rfl: 1  •  cholecalciferol (VITAMIN D3) 1,000 units tablet, Take 1,000 Units by mouth daily, Disp: , Rfl:   •  clopidogrel (PLAVIX) 75 mg tablet, Take 1 tablet (75 mg total) by mouth daily, Disp: 90 tablet, Rfl: 1  •  losartan (COZAAR) 50 mg tablet, Take 1 tablet (50 mg total) by mouth daily, Disp: 90 tablet, Rfl: 1  •  Thiamine HCl (VITAMIN B1 PO), Take 1 tablet by mouth in the morning, Disp: , Rfl:   •  albuterol (Ventolin HFA) 90 mcg/act inhaler, Inhale 2 puffs every 6 (six) hours as needed for wheezing (Patient not taking: Reported on 6/28/2023), Disp: 18 g, Rfl: 1  •  colchicine (COLCRYS) 0 6 mg tablet, Take 1 tablet (0 6 mg total) by mouth daily (Patient not taking: Reported on 5/15/2023), Disp: 30 tablet, Rfl: 1  •  Cyanocobalamin (VITAMIN B12 PO), Take 1 tablet by mouth in the morning (Patient not taking: Reported on 6/5/2023), Disp: , Rfl:   •  ergocalciferol (VITAMIN D2) 50,000 units, Take 1 capsule (50,000 Units total) by mouth once a week (Patient not taking: Reported on 5/15/2023), Disp: 12 capsule, Rfl: 0  •  rosuvastatin (CRESTOR) 10 MG tablet, Take 1 tablet (10 mg total) by mouth daily at bedtime (Patient not taking: Reported on 11/14/2022), Disp: 90 tablet, Rfl: 1  •  sodium polystyrene (KAYEXALATE) powder, Take 15 g by mouth daily after breakfast (Patient not taking: Reported on 6/28/2023), Disp: 453 6 g, Rfl: 0  •  Turmeric 500 MG CAPS, Take 1 capsule by mouth in the morning (Patient not taking: Reported on 6/28/2023), Disp: , Rfl:   Allergies   Allergen Reactions   • Other      [place] trees       Labs:     Chemistry        Component Value Date/Time     07/21/2015 0954    K 5 8 (H) 06/12/2023 1038     06/12/2023 1038    CO2 22 06/12/2023 1038    BUN 20 06/12/2023 1038    CREATININE 1 24 06/12/2023 1038    CREATININE 2 01 (H) 07/13/2021 1317    CREATININE 1 43 (H) 07/21/2015 0954        Component Value Date/Time    CALCIUM 9 3 07/13/2021 1317    CALCIUM 8 8 07/21/2015 0954    ALKPHOS 55 07/13/2021 1317    ALKPHOS 77 07/21/2015 0954    AST 18 06/12/2023 1038 "   ALT 13 06/12/2023 1038    BILITOT 0 53 07/21/2015 0954            Lab Results   Component Value Date    CHOL 142 12/16/2015    CHOL 231 07/21/2015     Lab Results   Component Value Date    HDL 39 (L) 05/01/2023    HDL 48 06/23/2022    HDL 41 05/17/2021     Lab Results   Component Value Date    LDLCALC 88 05/01/2023    LDLCALC 44 06/23/2022    LDLCALC 45 05/17/2021     Lab Results   Component Value Date    TRIG 178 (H) 05/01/2023    TRIG 110 06/23/2022    TRIG 107 05/17/2021     No results found for: \"CHOLHDL\"    Imaging: No results found  ECG:  Sinus bradycardia inferior infarct age indeterminate      Review of Systems   Constitutional: Negative  HENT: Negative  Eyes: Negative  Cardiovascular: Negative  Respiratory: Negative  Endocrine: Negative  Hematologic/Lymphatic: Negative  Skin: Negative  Musculoskeletal: Negative  Gastrointestinal: Negative  Genitourinary: Negative  Neurological: Negative  Psychiatric/Behavioral: Negative  All other systems reviewed and are negative  Vitals:    06/28/23 1139   BP: 134/66   Pulse: (!) 52   SpO2: 98%     Vitals:    06/28/23 1139   Weight: 83 9 kg (185 lb)     Height: 5' 3\" (160 cm)   Body mass index is 32 77 kg/m²  Physical Exam:  Vital signs reviewed  General:  Alert and cooperative, appears stated age, no acute distress  HEENT:  PERRLA, EOMI, no scleral icterus, no conjunctival pallor  Neck:  No lymphadenopathy, no thyromegaly, no carotid bruits, no elevated JVP  Heart:  Regular rate and rhythm, normal S1/S2, no S3/S4, no murmur, rubs or gallops  PMI nondisplaced  Lungs:  Clear to auscultation bilaterally, no wheezes rales or rhonchi  Abdomen:  Soft, non-tender, positive bowel sounds, no rebound or guarding,   no organomegaly   Extremities:  Normal range of motion    No clubbing, cyanosis or edema   Vascular:  2+ pedal pulses  Skin:  No rashes or lesions on exposed skin  Neurologic:  Cranial nerves II-XII grossly intact " without focal deficits  Psych:  Normal mood and affect

## 2023-06-28 NOTE — LETTER
Cardiology Pre Operative Clearance      PRE OPERATIVE CARDIAC RISK ASSESSMENT    06/28/23    Pola Franco  1945  [id number]    Date of Surgery: 07/13/2023    Type of Surgery: Left Total Shoulder Arthroscopy    Surgeon: Odin Quintana MD    No Cardiac Contraindication for Planned Surgical Procedures    Anticoagulation: Plavix 75 mg     Physician Comment: moderate CV risk    Electronically Signed: Karson

## 2023-06-28 NOTE — TELEPHONE ENCOUNTER
Patient called refill line and stated his cardiologist recommended patient to start taking crestor 10 mg tablet again  Please refill if patient should restart crestor?     Thank you

## 2023-06-28 NOTE — PROGRESS NOTES
[address] [facility] ALESHIA [address]  Hard[place] [id number] [phone]  Hematology Ambulatory Follow-Up  Kraig Gilliland, 1945, [id number]  6/28/2023    Assessment/Plan:    1  Essential thrombocythemia ([address] )  2  Monoallelic mutation of CALR3 gene   Patient is a 77-year-old male with a history of anxiety, arthritis, CKD, CAD, depression, GERD, hypertension, MI, hyperlipidemia, and gout who was initially referred to us for further evaluation of thrombocytosis  Patient also has a history of a cerebellar stroke and is on clopidogrel  Patient has had an elevated platelet count since at least 2019 that has been gradually increasing into the 900s  CBC otherwise normal   We requested a JAK2 mutation that was negative, MPL, exon 12-15 was also negative  VALERIA R was however positive  We discussed further evaluation with a bone marrow biopsy to assess for an underlying myeloproliferative disorder or leukemia  Patient has an upcoming shoulder surgery on July 13, 2023  He is on Plavix and anticipated to hold on July 8, 2023  We discussed possibility of coordinating bone marrow biopsy with surgery since he will already be off Plavix prior to the procedure  We will plan to see him after bone marrow biopsy for further discussion  If bone marrow biopsy is negative I suspect we will use Hydrea 500 mg p o  once daily  Patient and his wife verbalized understanding and are in agreement with plan  - Ambulatory Referral to Interventional Radiology; Future    Barrier(s) to care: None  The patient is able to self care  [address] [facility]    Interval history: Clinically stable    Review of Systems   Constitutional: Negative for activity change, appetite change, fatigue, fever and unexpected weight change  Respiratory: Negative for cough and shortness of breath      Cardiovascular: Negative for chest pain and leg swelling  Gastrointestinal: Negative for abdominal pain, constipation, diarrhea and nausea  Endocrine: Negative for cold intolerance and heat intolerance  Musculoskeletal: Negative for arthralgias and myalgias  Skin: Negative  Neurological: Negative for dizziness, weakness and headaches  Hematological: Negative for adenopathy  Does not bruise/bleed easily         Patient Active Problem List   Diagnosis   • Low vitamin D level   • Adolescent idiopathic scoliosis of thoracolumbar region   • Abnormal glucose   • Benign essential HTN   • BPH (benign prostatic hyperplasia)   • Hypercholesterolemia   • Scoliosis   • Coronary artery disease involving native coronary artery of native heart without angina pectoris   • Acute gouty arthritis   • BMI 35 0-35 9,adult   • Preop cardiovascular exam   • Dysplastic nevus   • Carotid artery stenosis, asymptomatic, bilateral   • CKD (chronic kidney disease) stage 3, GFR 30-59 ml/min ([facility])   • Slow transit constipation   • Obesity, morbid ([facility] [address] )   • Type 2 diabetes mellitus with stage 3a chronic kidney disease, without long-term current use of insulin ([facility] [address] )   • Essential thrombocythemia ([facility] [address] )   • Hyperkalemia       Past Medical History:   Diagnosis Date   • Actinic keratosis     RESOLVED: [id number]   • Adolescent idiopathic scoliosis of thoracolumbar region 04/12/2018   • Allergic    • Anxiety    • Arthritis    • Basal cell carcinoma of back     RESOLVED: [id number]   • Basal cell carcinoma of skin of upper extremity, including shoulder    • BCC (basal cell carcinoma), trunk    • BCE (basal cell epithelioma), trunk     RESOLVED: [id number]   • Benign neoplasm of skin of face    • Coronary artery disease involving native coronary artery of native heart without angina pectoris 10/16/2018   • Depression 10/06/2015   • Gastroesophageal reflux disease without esophagitis 04/21/2017   • GERD (gastroesophageal reflux disease)    • Hypertension    • Myocardial infarction [facility])    • Neoplasm of uncertain behavior of skin    • Nonmelanoma skin cancer     LAST ASSESSED: [id number]   • Scoliosis    • Seborrheic keratosis     RESOLVED: [id number]   • Stroke [facility])        Past Surgical History:   Procedure Laterality Date   • ANAL FISTULOTOMY  06/13/2011    DR SANCHEZ   • CORONARY ARTERY BYPASS GRAFT  05/17/2005    TRIPLE; DR Jonah Vargas  10/2009    DR MARRERO   • JOINT REPLACEMENT      shoulder replacement   • KNEE SURGERY     • SHOULDER SURGERY  06/18/2015    JOINT REPLACEMENT; DR Edna Soto   • SPINE SURGERY  12/2020    spine fusion       Family History   Problem Relation Age of Onset   • Other Mother         BRAIN TUMOR   • No Known Problems Father    • Vascular Disease Brother    • Cancer Brother    • Dementia Brother    • Heart disease Brother    • COPD Brother    • Prostate cancer Brother    • Substance Abuse Brother    • Drug abuse Brother        Social History     Socioeconomic History   • Marital status: /Civil Union     Spouse name: Not on file   • Number of children: Not on file   • Years of education: Not on file   • Highest education level: Not on file   Occupational History   • Not on file   Tobacco Use   • Smoking status: Light Smoker     Types: Cigars   • Smokeless tobacco: Never   • Tobacco comments:     one cigar once in awhile   Vaping Use   • Vaping Use: Never used   Substance and Sexual Activity   • Alcohol use:  Yes     Alcohol/week: 1 0 standard drink of alcohol     Types: 1 Glasses of wine per week     Comment: occasionally   • Drug use: No   • Sexual activity: Not Currently     Partners: Female     Birth control/protection: Male Sterilization   Other Topics Concern   • Not on file   Social History Narrative   • Not on file     Social Determinants of Health     Financial Resource Strain: Not on file   Food Insecurity: Not on file   Transportation Needs: Not on file   Physical Activity: Not on file   Stress: Not "on file   Social Connections: Not on file   Intimate Partner Violence: Not on file   Housing Stability: Not on file         Current Outpatient Medications:   •  allopurinol (ZYLOPRIM) 100 mg tablet, Take 1 tablet (100 mg total) by mouth daily, Disp: 90 tablet, Rfl: 3  •  carvedilol (COREG) 25 mg tablet, Take 1 tablet (25 mg total) by mouth 2 (two) times a day with meals, Disp: 180 tablet, Rfl: 1  •  cholecalciferol (VITAMIN D3) 1,000 units tablet, Take 1,000 Units by mouth daily, Disp: , Rfl:   •  clopidogrel (PLAVIX) 75 mg tablet, Take 1 tablet (75 mg total) by mouth daily, Disp: 90 tablet, Rfl: 1  •  losartan (COZAAR) 50 mg tablet, Take 1 tablet (50 mg total) by mouth daily, Disp: 90 tablet, Rfl: 1  •  Thiamine HCl (VITAMIN B1 PO), Take 1 tablet by mouth in the morning, Disp: , Rfl:   •  albuterol (Ventolin HFA) 90 mcg/act inhaler, Inhale 2 puffs every 6 (six) hours as needed for wheezing (Patient not taking: Reported on 6/28/2023), Disp: 18 g, Rfl: 1  •  colchicine (COLCRYS) 0 6 mg tablet, Take 1 tablet (0 6 mg total) by mouth daily (Patient not taking: Reported on 5/15/2023), Disp: 30 tablet, Rfl: 1  •  Cyanocobalamin (VITAMIN B12 PO), Take 1 tablet by mouth in the morning (Patient not taking: Reported on 6/5/2023), Disp: , Rfl:   •  ergocalciferol (VITAMIN D2) 50,000 units, Take 1 capsule (50,000 Units total) by mouth once a week (Patient not taking: Reported on 5/15/2023), Disp: 12 capsule, Rfl: 0  •  rosuvastatin (CRESTOR) 10 MG tablet, Take 1 tablet (10 mg total) by mouth daily at bedtime (Patient not taking: Reported on 11/14/2022), Disp: 90 tablet, Rfl: 1    Allergies   Allergen Reactions   • Other      [place] trees       Objective:  /68 (BP Location: Left arm, Patient Position: Sitting, Cuff Size: Standard)   Pulse (!) 54   Temp 97 8 °F (36 6 °C) (Temporal)   Ht 5' 3\" (1 6 m)   Wt 83 9 kg (185 lb)   SpO2 94%   BMI 32 77 kg/m²    Physical Exam  Vitals reviewed     Constitutional:       Appearance: " Normal appearance  He is well-developed  HENT:      Head: Normocephalic and atraumatic  Eyes:      Pupils: Pupils are equal, round, and reactive to light  Pulmonary:      Effort: Pulmonary effort is normal  No respiratory distress  Musculoskeletal:         General: Normal range of motion  Cervical back: Normal range of motion  Lymphadenopathy:      Cervical: No cervical adenopathy  Skin:     General: Skin is dry  Neurological:      Mental Status: He is alert and oriented to person, place, and time     Psychiatric:         Behavior: Behavior normal      Result Review  Labs:  Orders Only on 06/12/2023   Component Date Value Ref Range Status   • White Blood Cell Count 06/12/2023 9 1  3 4 - 10 8 x10E3/uL Final   • Red Blood Cell Count 06/12/2023 4 89  4 14 - 5 80 x10E6/uL Final   • Hemoglobin 06/12/2023 14 8  13 0 - 17 7 g/dL Final   • HCT 06/12/2023 44 0  37 5 - 51 0 % Final   • MCV 06/12/2023 90  79 - 97 fL Final   • MCH 06/12/2023 30 3  26 6 - 33 0 pg Final   • MCHC 06/12/2023 33 6  31 5 - 35 7 g/dL Final   • RDW 06/12/2023 14 3  11 6 - 15 4 % Final   • Platelet Count 79/36/7032 928 (HH)  150 - 450 x10E3/uL Final   • Neutrophils 06/12/2023 66  Not Estab  % Final   • Lymphocytes 06/12/2023 22  Not Estab  % Final   • Monocytes 06/12/2023 7  Not Estab  % Final   • Eosinophils 06/12/2023 4  Not Estab  % Final   • Basophils PCT 06/12/2023 0  Not Estab  % Final   • Neutrophils (Absolute) 06/12/2023 5 9  1 4 - 7 0 x10E3/uL Final   • Lymphocytes (Absolute) 06/12/2023 2 0  0 7 - 3 1 x10E3/uL Final   • Monocytes (Absolute) 06/12/2023 0 6  0 1 - 0 9 x10E3/uL Final   • Eosinophils (Absolute) 06/12/2023 0 3  0 0 - 0 4 x10E3/uL Final   • Basophils ABS 06/12/2023 0 0  0 0 - 0 2 x10E3/uL Final   • Immature Granulocytes 06/12/2023 1  Not Estab  % Final   • Immature Granulocytes (Absolute) 06/12/2023 0 1  0 0 - 0 1 x10E3/uL Final   • Glucose, Random 06/12/2023 119 (H)  70 - 99 mg/dL Final   • BUN 06/12/2023 20  8 - 27 mg/dL Final   • Creatinine 06/12/2023 1 24  0 76 - 1 27 mg/dL Final   • eGFR 06/12/2023 59 (L)  >59 mL/min/1 73 Final   • SL AMB BUN/CREATININE RATIO 06/12/2023 16  10 - 24 Final   • Sodium 06/12/2023 141  134 - 144 mmol/L Final   • Potassium 06/12/2023 5 8 (H)  3 5 - 5 2 mmol/L Final   • Chloride 06/12/2023 105  96 - 106 mmol/L Final   • CO2 06/12/2023 22  20 - 29 mmol/L Final   • CALCIUM 06/12/2023 9 6  8 6 - 10 2 mg/dL Final   • Protein, Total 06/12/2023 6 2  6 0 - 8 5 g/dL Final   • Albumin 06/12/2023 4 7  3 7 - 4 7 g/dL Final   • Globulin, Total 06/12/2023 1 5  1 5 - 4 5 g/dL Final   • Albumin/Globulin Ratio 06/12/2023 3 1 (H)  1 2 - 2 2 Final   • TOTAL BILIRUBIN 06/12/2023 0 4  0 0 - 1 2 mg/dL Final   • Alk Phos Isoenzymes 06/12/2023 47  44 - 121 IU/L Final   • AST 06/12/2023 18  0 - 40 IU/L Final   • ALT 06/12/2023 13  0 - 44 IU/L Final   • JAK2 V617F Mutation Detection 06/12/2023 Comment   Final    Comment: NEGATIVE  The JAK2 V617F mutation is not detected in the provided specimen  of this individual  Results should be interpreted in conjunction  with clinical and other laboratory findings for the most accurate  interpretation  • Reflex 06/12/2023 Comment   Final    Comment: Reflex to CALR Mutation Analysis, JAK2 Exon 12-15 Mutation Analysis,  and MPL Mutation Analysis is indicated  • PERFORMANCE 06/12/2023 Comment   Final    Comment: Molecular testing of blood or bone marrow is useful in the  evaluation of suspected myeloproliferative neoplasms (MPN)  Mutations in the JAK2, MPL, and CALR genes are present in  virtually all MPNs and their presence help distinguish benign  reactive processes from clonal neoplasms  These mutations are  generally considered mutually exclusive, although concurrent  clones have been reported in rare patients   This test will assess  for the [id number] (exon 14) mutation first and will reflex to  CALR mutation analysis, MPL mutation analysis, and JAK2 exon 12  to 15 mutation analysis if the [id number] mutation is negative  The JAK2 (Janus kinase 2) gene encodes for a non-receptor protein  tyrosine kinase that activates cytokine and growth factor  signaling  The V617F (c 1849 G>T) mutation results in  constitutive activation of JAK2 and downstream STAT5 and ERK  signaling  The V617F mutation is observed in approximately 95% of  polycythemia vera (PV), 60% of essential thrombocythemia (ET) a                           nd  primary myelofibrosis (PMF)  It is also infrequently present  (3-5%) in myelodysplastic syndrome, chronic myelomonocytic  leukemia, and other atypical chronic myeloid disorders  A small  percentage of JAK2 mutation positive patients (3 3%) contain  other non-V617F mutations within exons 12 to 15  In particular,  mutations in exon 12 of JAK2 have been described in approximately  3% of patients with PV  JAK2 allele burden correlates with  clinical phenotype, with low levels of mutant allele  characterized by thrombocytosis, intermediate levels with  erythrocytosis, and high mutant allele burden correlating with  enhanced myelopoiesis of the BM, leukocytosis, increasing spleen  size, and circulating CD34-positive cells  The CALR (Calreticulin) gene encodes for a multifunctional  calcium-binding protein involved in many cellular activities such  as growth, proliferation, adhesion, and programmed cell death  Among patients with JAK2 negative MPNs, CALR are found in  approximately 70%                            of patients with JAK2-negative essential  thrombocythemia (ET) and 60-88% of patients with JAK2-negative  primary myelofibrosis(PMF)  Only a minority of patients  (approximately 8%) with myelodysplasia have mutations in the CALR  gene  CALR mutations are rarely detected in patients with de del  acute myeloid leukemia, chronic myelogenous leukemia, lymphoid  leukemia, or solid tumors   CALR mutations are not detected in  polycythemia and generally appear to be mutually exclusive with  JAK2 mutations and MPL mutations  The majority of mutational  changes involve a variety of insertion deletion mutations in exon  9 of the calreticulin gene: approximately 53% of all CALR  mutations are a 52 bp deletion (type-1) while the second most  prevalent mutation (approximately 32%) contains a 5 bp insertion  (type-2)  Other mutations (non-type 1 or type 2) are seen in a  small minority of cases  CALR mutations in PMF tend to be  with a favorable prognosis compared to JAK2 V617F mutations,  whereas p                           rimary myelofibrosis negative for CALR, JAK2 V617F and  MPL mutations (so-called triple negative) is associated with a  poor prognosis and shorter survival   The MPL (myeloproliferative leukemia virus oncogene) gene encodes  the thrombopoietin receptor which regulates hematopoiesis and  megakaryopoiesis  Activating MPL mutations are associated with a  subset of myeloproliferative neoplasms and acute megakaryoblastic  leukemia  MPL W515 mutations are present in approximately 5-8% of  patients with primary myelofibrosis (PMF) and 1-4% of patients  with essential thrombocythemia (ET)  The S505 mutation is  detected in patients with hereditary thrombocythemia  Limitations  This assay has a sensitivity of approximately 1% VAF for JAK2 V617F, 2 5%  VAF for other mutations in JAK2 exons 12 to 15, CALR mutations, and MPL  mutations  • METHODOLOGY 06/12/2023 Comment   Final    Amplicon-based next generation sequencing  • REFERENCES: 06/12/2023 Comment   Final    Comment: Emma Patton Geroldine Almas  Detection of  mutations in JAK2 exons 12-15 by [employer] sequencing  Int J Lab  Hematol  2016 Feb;38(1):34-41  doi: 10 1111/ijlh  63584  Epub 2015  Sep 11  PMID: [id number]  Alan CARLTON, Bud R, Beatriz Merino, Rafy PERALTA, Hardik Tam  MM, Rashi GAUTHEIR, Tiki Sampson   The 2016 revision to  the World Health Organization classification of myeloid neoplasms  and acute leukemia  Blood  2016 May 19;127(20):239-542  doi:  10 [id number]/[id number]  Epub 2016 Apr 11  PMID: [id number]  Magan Rudolph,  Dina KENT  Mutation profile of JAK2 transcripts in patients with  chronic myeloproliferative neoplasias  J Mol Diagn  2009 Jan;11(1):49-53 doi: 10 2353/jmoldx 2009 085683  Epub 2008 Dec 12  PMID: [id number]; PMCID: [id number]  NCCN Clinical Practice Guidelines in Oncology (NCCN Guidelines)  Myeloproliferative Neoplasms Version 3 2022 - August 11, 2022  Gail SH,   WHO classification                            of Tumours of  Haematopoietic and Lymphoid Tissues  4th edn  Diana Moscoso, Robin:  International Agency for The [employer]; 2017  Diamond A  Primary myelofibrosis: 2021 update on diagnosis,  risk-stratification and management  Am J Hematol  2021 Jan;96(1):145-162  doi: 10 1002/ajh 92249  Epub 2020 Dec 2  PMID:  [id number]  Bishop Estrada ZAVALA  Genetic basis and molecular  pathophysiology of classical myeloproliferative neoplasms  Blood  2017 Feb 9;129(6):667-679  doi: 10 [id number]/[id number]  Epub 2016 Dec 27  PMID: [id number]  • Director Review 06/12/2023 Comment   Final    Comment: Macey Finley, PhD, [facility]       Director, Amy Jones [facility] On[facility]      [facility] [address] [phone]     • CALR RESULT 06/12/2023 Comment   Final    Comment: POSITIVE  A deletion mutation of approximately 52 bp was detected within  the analyzed region of the calreticulin (CALR) gene  This most  likely represents the type-1 mutation [52bp deletion  (p L367fs*46)]  A positive result indicates the presence of a clonal population  carrying CALR gene mutations  Results should be interpreted in  conjunction with clinical and laboratory findings for the most  accurate interpretation       • CALR % 06/12/2023 38 30  % Final   • CALR NUCLEOTIDE 06/12/2023 Comment:   Final    c [id number]   • CALR AMINO ACID 06/12/2023 Comment:   Final    p [id number]   • MPL RESULT 06/12/2023 Comment   Final    Comment: NEGATIVE  No MPL mutation was identified in the provided specimen of this  individual  Results should be interpreted in conjunction with  clinical and other laboratory findings for the most accurate  interpretation  • E12-15 RESULT 06/12/2023 Comment   Final    Comment: NEGATIVE  JAK2 mutations were not detected in exons 12, 13, 14 and 15  This  result does not rule out the presence of JAK2 mutation at a level  below the detection sensitivity of this assay, the presence of  other mutations outside the analyzed region of the JAK2 gene, or  the presence of a myeloproliferative or other neoplasm  Result must  be correlated with other clinical data for the most accurate  diagnosis  • Vitamin B-12 06/12/2023 502  232 - 1,245 pg/mL Final   • FOLATE, SERUM 06/12/2023 5 8  >3 0 ng/mL Final    Comment: A serum folate concentration of less than 3 1 ng/mL is  considered to represent clinical deficiency  Please note: This report has been generated by a voice recognition software system  Therefore there may be syntax, spelling, and/or grammatical errors  Please call if you have any questions

## 2023-06-30 RX ORDER — ROSUVASTATIN CALCIUM 10 MG/1
10 TABLET, COATED ORAL
Qty: 90 TABLET | Refills: 1 | Status: SHIPPED | OUTPATIENT
Start: 2023-06-30

## 2023-07-05 ENCOUNTER — HOSPITAL ENCOUNTER (OUTPATIENT)
Dept: NON INVASIVE DIAGNOSTICS | Facility: CLINIC | Age: 78
Discharge: HOME | End: 2023-07-05
Payer: COMMERCIAL

## 2023-07-05 DIAGNOSIS — I65.23 CAROTID ARTERY STENOSIS, ASYMPTOMATIC, BILATERAL: ICD-10-CM

## 2023-07-05 PROCEDURE — 93880 EXTRACRANIAL BILAT STUDY: CPT

## 2023-07-05 PROCEDURE — 93880 EXTRACRANIAL BILAT STUDY: CPT | Performed by: SURGERY

## 2023-07-11 ENCOUNTER — HOSPITAL ENCOUNTER (OUTPATIENT)
Dept: NON INVASIVE DIAGNOSTICS | Facility: CLINIC | Age: 78
Discharge: HOME | End: 2023-07-11
Payer: COMMERCIAL

## 2023-07-11 DIAGNOSIS — I73.9 CLAUDICATION (HCC): ICD-10-CM

## 2023-07-11 PROCEDURE — 93978 VASCULAR STUDY: CPT

## 2023-07-11 PROCEDURE — 93978 VASCULAR STUDY: CPT | Performed by: SURGERY

## 2023-07-17 ENCOUNTER — TRANSITIONAL CARE MANAGEMENT (OUTPATIENT)
Dept: INTERNAL MEDICINE CLINIC | Facility: OTHER | Age: 78
End: 2023-07-17

## 2023-07-17 NOTE — PRE-PROCEDURE INSTRUCTIONS
Pre-procedure Instructions for Interventional Radiology  [address] [address] [place]  [phone]    You are scheduled for a/an Bone Marrow Biopsy. On Monday 7/24/23. Your tentative arrival time is 0730. Short stay will notify you the day before your procedure with the exact arrival time and the location to arrive. To prepare for your procedure:  1. Please arrange for someone to drive you home after the procedure and stay with you until the next morning if you are instructed to do so. This is typically for patients receiving some type of sedative or anesthetic for the procedure. 2. DO NOT EAT OR DRINK ANYTHING after midnight on the evening before your procedure including candy & gum.  3. ONLY SIPS OF WATER with your medications are allowed on the morning of your procedure. 4. TAKE ALL OF YOUR REGULAR MEDICATIONS THE MORNING OF YOUR PROCEDURE with sips of water! We may call you to stop some of your blood sugar, blood pressure and blood thinning medications depending on the procedure. Please take all of these medications unless we instruct you to stop them. 5. If you have an allergy to x-ray dye, please contact Interventional Radiology for an x-ray dye preparation which usually consists of an oral steroid and Benadryl. The day of your procedure:  1. Bring a list of the medications you take at home. 2. Bring medications you take for breathing problems (such as inhalers), medications for chest pain, or both. 3. Bring a case for your glasses or contacts. 4. Bring your insurance card and a form of photo ID.  5. Please leave all valuables such as credit cards and jewelry at home. 6. Report to the registration desk in the main lobby at the [facility], Entrance B. Ask to be directed to [facility]. 7. While your procedure is being performed, your family may wait in the Radiology Waiting Room on the 1st floor in Radiology.   if they need to leave, they may provide a number to be called following the procedure. 8. Be prepared to stay overnight just in case. Sometimes procedures will indicate the need for further observation or treatment. 9. If you are scheduled for a follow-up visit with the Interventional Radiologist after your procedure, you will be called with a date and time.     Special Instructions (Medications to stop taking before your procedure etc.):

## 2023-07-23 DIAGNOSIS — I10 BENIGN ESSENTIAL HTN: ICD-10-CM

## 2023-07-23 DIAGNOSIS — E78.00 HYPERCHOLESTEROLEMIA: ICD-10-CM

## 2023-07-23 DIAGNOSIS — I63.9 CEREBELLAR STROKE (HCC): ICD-10-CM

## 2023-07-24 ENCOUNTER — HOSPITAL ENCOUNTER (OUTPATIENT)
Dept: RADIOLOGY | Facility: HOSPITAL | Age: 78
Discharge: HOME | End: 2023-07-24
Attending: INTERNAL MEDICINE
Payer: COMMERCIAL

## 2023-07-24 VITALS
OXYGEN SATURATION: 100 % | DIASTOLIC BLOOD PRESSURE: 85 MMHG | HEIGHT: 63 IN | SYSTOLIC BLOOD PRESSURE: 121 MMHG | HEART RATE: 107 BPM | BODY MASS INDEX: 31.89 KG/M2 | RESPIRATION RATE: 16 BRPM | TEMPERATURE: 97.7 F | WEIGHT: 180 LBS

## 2023-07-24 DIAGNOSIS — Z15.89 MONOALLELIC MUTATION OF CALR3 GENE: ICD-10-CM

## 2023-07-24 DIAGNOSIS — D47.3 ESSENTIAL THROMBOCYTHEMIA (HCC): ICD-10-CM

## 2023-07-24 PROCEDURE — 88313 SPECIAL STAINS GROUP 2: CPT | Performed by: STUDENT IN AN ORGANIZED HEALTH CARE EDUCATION/TRAINING PROGRAM

## 2023-07-24 PROCEDURE — 99152 MOD SED SAME PHYS/QHP 5/>YRS: CPT

## 2023-07-24 PROCEDURE — 88342 IMHCHEM/IMCYTCHM 1ST ANTB: CPT | Performed by: STUDENT IN AN ORGANIZED HEALTH CARE EDUCATION/TRAINING PROGRAM

## 2023-07-24 PROCEDURE — 88341 IMHCHEM/IMCYTCHM EA ADD ANTB: CPT | Performed by: STUDENT IN AN ORGANIZED HEALTH CARE EDUCATION/TRAINING PROGRAM

## 2023-07-24 PROCEDURE — 88364 INSITU HYBRIDIZATION (FISH): CPT | Performed by: STUDENT IN AN ORGANIZED HEALTH CARE EDUCATION/TRAINING PROGRAM

## 2023-07-24 PROCEDURE — 85007 BL SMEAR W/DIFF WBC COUNT: CPT | Performed by: RADIOLOGY

## 2023-07-24 PROCEDURE — 88365 INSITU HYBRIDIZATION (FISH): CPT | Performed by: STUDENT IN AN ORGANIZED HEALTH CARE EDUCATION/TRAINING PROGRAM

## 2023-07-24 PROCEDURE — 77012 CT SCAN FOR NEEDLE BIOPSY: CPT

## 2023-07-24 PROCEDURE — 85097 BONE MARROW INTERPRETATION: CPT | Performed by: STUDENT IN AN ORGANIZED HEALTH CARE EDUCATION/TRAINING PROGRAM

## 2023-07-24 PROCEDURE — 88311 DECALCIFY TISSUE: CPT | Performed by: STUDENT IN AN ORGANIZED HEALTH CARE EDUCATION/TRAINING PROGRAM

## 2023-07-24 PROCEDURE — 38222 DX BONE MARROW BX & ASPIR: CPT

## 2023-07-24 PROCEDURE — 88305 TISSUE EXAM BY PATHOLOGIST: CPT | Performed by: STUDENT IN AN ORGANIZED HEALTH CARE EDUCATION/TRAINING PROGRAM

## 2023-07-24 PROCEDURE — 85027 COMPLETE CBC AUTOMATED: CPT | Performed by: RADIOLOGY

## 2023-07-24 RX ORDER — SODIUM CHLORIDE 9 MG/ML
75 INJECTION, SOLUTION INTRAVENOUS CONTINUOUS
Status: DISCONTINUED | OUTPATIENT
Start: 2023-07-24 | End: 2023-07-25 | Stop reason: HOSPADM

## 2023-07-24 RX ORDER — LIDOCAINE HYDROCHLORIDE 10 MG/ML
INJECTION, SOLUTION EPIDURAL; INFILTRATION; INTRACAUDAL; PERINEURAL AS NEEDED
Status: COMPLETED | OUTPATIENT
Start: 2023-07-24 | End: 2023-07-24

## 2023-07-24 RX ORDER — LOSARTAN POTASSIUM 50 MG/1
50 TABLET ORAL DAILY
Qty: 90 TABLET | Refills: 0 | OUTPATIENT
Start: 2023-07-24

## 2023-07-24 RX ORDER — FENTANYL CITRATE 50 UG/ML
INJECTION, SOLUTION INTRAMUSCULAR; INTRAVENOUS AS NEEDED
Status: COMPLETED | OUTPATIENT
Start: 2023-07-24 | End: 2023-07-24

## 2023-07-24 RX ORDER — CLOPIDOGREL BISULFATE 75 MG/1
75 TABLET ORAL DAILY
Qty: 90 TABLET | Refills: 0 | OUTPATIENT
Start: 2023-07-24

## 2023-07-24 RX ORDER — CARVEDILOL 25 MG/1
25 TABLET ORAL 2 TIMES DAILY WITH MEALS
Qty: 180 TABLET | Refills: 0 | OUTPATIENT
Start: 2023-07-24

## 2023-07-24 RX ORDER — MIDAZOLAM HYDROCHLORIDE 2 MG/2ML
INJECTION, SOLUTION INTRAMUSCULAR; INTRAVENOUS AS NEEDED
Status: COMPLETED | OUTPATIENT
Start: 2023-07-24 | End: 2023-07-24

## 2023-07-24 RX ADMIN — LIDOCAINE HYDROCHLORIDE 10 ML: 10 INJECTION, SOLUTION EPIDURAL; INFILTRATION; INTRACAUDAL; PERINEURAL at 09:11

## 2023-07-24 RX ADMIN — FENTANYL CITRATE 50 MCG: 50 INJECTION, SOLUTION INTRAMUSCULAR; INTRAVENOUS at 09:05

## 2023-07-24 RX ADMIN — MIDAZOLAM 1 MG: 1 INJECTION INTRAMUSCULAR; INTRAVENOUS at 09:05

## 2023-07-24 NOTE — DISCHARGE INSTRUCTIONS
Bone Marrow Biopsy     WHAT YOU NEED TO KNOW:   A bone marrow biopsy is a procedure to remove a small amount of bone marrow from your bone. Bone marrow is the soft tissue inside your bone that helps to make blood cells. The sample is tested for disease or infection. DISCHARGE INSTRUCTIONS:     1. Limit your activities day of biopsy as directed by your doctor. 2. Use medication as ordered. 3. Return to your normal diet. Small sips of flat soda will help with nausea. 4. Remove band-aid or dressing 24 hours after procedure. Contact Interventional Radiology at [phone] Dione PATIENTS: Contact Interventional Radiology at [phone]) Bharati Block PATIENTS: Contact Interventional Radiology at [phone]) if:    1. Difficulty breathing, nausea or vomiting. 2. Chills or fever above 101 F.    3. Pain at biopsy site not relieved by medication. 4. Develop any redness, swelling, heat, unusual drainage, heavy bruising or bleeding from biopsy site. Moderate Sedation   WHAT YOU NEED TO KNOW:   Moderate sedation, or conscious sedation, is medicine used during procedures to help you feel relaxed and calm. You will be awake and able to follow directions without anxiety or pain. You will remember little to none of the procedure. You may feel tired, weak, or unsteady on your feet after you get sedation. You may also have trouble concentrating or short-term memory loss. These symptoms should go away in 24 hours or less. DISCHARGE INSTRUCTIONS:   Call 911 or have someone else call for any of the following: You have sudden trouble breathing. You cannot be woken. Seek care immediately if:   You have a severe headache or dizziness. Your heart is beating faster than usual.  Contact your healthcare provider if:   You have a fever. You have nausea or are vomiting for more than 8 hours after the procedure. Your skin is itchy, swollen, or you have a rash.      You have questions or concerns about your condition or care. Self-care:   Have someone stay with you for 24 hours. This person can drive you to errands and help you do things around the house. This person can also watch for problems. Rest and do quiet activities for 24 hours. Do not exercise, ride a bike, or play sports. Stand up slowly to prevent dizziness and falls. Take short walks around the house with another person. Slowly return to your usual activities the next day. Do not drive or use dangerous machines or tools for 24 hours. You may injure yourself or others. Examples include a lawnmower, saw, or drill. Do not return to work for 24 hours if you use dangerous machines or tools for work. Do not make important decisions for 24 hours. For example, do not sign important papers or invest money. Drink liquids as directed. Liquids help flush the sedation medicine out of your body. Ask how much liquid to drink each day and which liquids are best for you. Eat small, frequent meals to prevent nausea and vomiting. Start with clear liquids such as juice or broth. If you do not vomit after clear liquids, you can eat your usual foods. Do not drink alcohol or take medicines that make you drowsy. This includes medicines that help you sleep and anxiety medicines. Ask your healthcare provider if it is safe for you to take pain medicine. Follow up with your healthcare provider as directed: Write down your questions so you remember to ask them during your visits. © 2017 5 [facility] [place] Information is for End User's use only and may not be sold, redistributed or otherwise used for commercial purposes. All illustrations and images included in CareNotes® are the copyrighted property of [employer] Inc. or Thaddeus Romero. The above information is an  only. It is not intended as medical advice for individual conditions or treatments.  Talk to your doctor, nurse or pharmacist before following any medical regimen to see if it is safe and effective for you.

## 2023-07-24 NOTE — SEDATION DOCUMENTATION
IR bone marrow biopsy performed with Dr. My Hood. Pt tolerated procedure well. Bandaid place on biopsy site. Report called to short stay RN. IR bed rest start time 0930.

## 2023-07-24 NOTE — BRIEF OP NOTE (RAD/CATH)
INTERVENTIONAL RADIOLOGY PROCEDURE NOTE    Date: 7/24/2023    Procedure:   Procedure Summary     Date: 07/24/23 Room / Location: [address] [place] [address]    Anesthesia Start:  Anesthesia Stop:     Procedure: IR BIOPSY BONE MARROW Diagnosis:       Essential thrombocythemia ([address])      Monoallelic mutation of CALR3 gene      (Monoallelic mutation of CALR3 gene)    Scheduled Providers: Britney Owen MD Responsible Provider:     Anesthesia Type: Not recorded ASA Status: Not recorded          Preoperative diagnosis:   1. Essential thrombocythemia ([address])    2. Monoallelic mutation of CALR3 gene         Postoperative diagnosis: Same. Surgeon: Britney Owen MD     Assistant: None. No qualified resident was available. Blood loss: minimal    Specimens: bone marrow     Findings: bone marrow biopsy    Complications: None immediate.     Anesthesia: conscious sedation

## 2023-07-25 LAB
BASOPHILS # BLD MANUAL: 0.2 THOUSAND/UL (ref 0–0.1)
BASOPHILS NFR MAR MANUAL: 2 % (ref 0–1)
DIFFERENTIAL COMMENT: ABNORMAL
EOSINOPHIL # BLD MANUAL: 0.39 THOUSAND/UL (ref 0–0.4)
EOSINOPHIL NFR BLD MANUAL: 4 % (ref 0–6)
ERYTHROCYTE [DISTWIDTH] IN BLOOD BY AUTOMATED COUNT: 15.1 % (ref 11.6–15.1)
HCT VFR BLD AUTO: 33.8 % (ref 36.5–49.3)
HGB BLD-MCNC: 11.2 G/DL (ref 12–17)
LYMPHOCYTES # BLD AUTO: 1.77 THOUSAND/UL (ref 0.6–4.47)
LYMPHOCYTES # BLD AUTO: 13 % (ref 14–44)
MCH RBC QN AUTO: 30.4 PG (ref 26.8–34.3)
MCHC RBC AUTO-ENTMCNC: 33.1 G/DL (ref 31.4–37.4)
MCV RBC AUTO: 92 FL (ref 82–98)
METAMYELOCYTES NFR BLD MANUAL: 1 % (ref 0–1)
MONOCYTES # BLD AUTO: 0.49 THOUSAND/UL (ref 0–1.22)
MONOCYTES NFR BLD: 5 % (ref 4–12)
NEUTROPHILS # BLD MANUAL: 6.88 THOUSAND/UL (ref 1.85–7.62)
NEUTS BAND NFR BLD MANUAL: 4 % (ref 0–8)
NEUTS SEG NFR BLD AUTO: 66 % (ref 43–75)
PLATELET # BLD AUTO: 685 THOUSANDS/UL (ref 149–390)
PLATELET BLD QL SMEAR: ABNORMAL
PMV BLD AUTO: 10.2 FL (ref 8.9–12.7)
RBC # BLD AUTO: 3.68 MILLION/UL (ref 3.88–5.62)
RBC MORPH BLD: NORMAL
VARIANT LYMPHS # BLD AUTO: 5 %
WBC # BLD AUTO: 9.83 THOUSAND/UL (ref 4.31–10.16)

## 2023-08-01 LAB — MISCELLANEOUS LAB TEST RESULT: NORMAL

## 2023-08-02 LAB — MISCELLANEOUS LAB TEST RESULT: NORMAL

## 2023-08-04 LAB — MISCELLANEOUS LAB TEST RESULT: NORMAL

## 2023-08-04 PROCEDURE — 88311 DECALCIFY TISSUE: CPT | Performed by: STUDENT IN AN ORGANIZED HEALTH CARE EDUCATION/TRAINING PROGRAM

## 2023-08-04 PROCEDURE — 85060 BLOOD SMEAR INTERPRETATION: CPT | Performed by: STUDENT IN AN ORGANIZED HEALTH CARE EDUCATION/TRAINING PROGRAM

## 2023-08-04 PROCEDURE — 88305 TISSUE EXAM BY PATHOLOGIST: CPT | Performed by: STUDENT IN AN ORGANIZED HEALTH CARE EDUCATION/TRAINING PROGRAM

## 2023-08-04 PROCEDURE — 88341 IMHCHEM/IMCYTCHM EA ADD ANTB: CPT | Performed by: STUDENT IN AN ORGANIZED HEALTH CARE EDUCATION/TRAINING PROGRAM

## 2023-08-04 PROCEDURE — 88342 IMHCHEM/IMCYTCHM 1ST ANTB: CPT | Performed by: STUDENT IN AN ORGANIZED HEALTH CARE EDUCATION/TRAINING PROGRAM

## 2023-08-04 PROCEDURE — 88364 INSITU HYBRIDIZATION (FISH): CPT | Performed by: STUDENT IN AN ORGANIZED HEALTH CARE EDUCATION/TRAINING PROGRAM

## 2023-08-04 PROCEDURE — 88365 INSITU HYBRIDIZATION (FISH): CPT | Performed by: STUDENT IN AN ORGANIZED HEALTH CARE EDUCATION/TRAINING PROGRAM

## 2023-08-04 PROCEDURE — 88313 SPECIAL STAINS GROUP 2: CPT | Performed by: STUDENT IN AN ORGANIZED HEALTH CARE EDUCATION/TRAINING PROGRAM

## 2023-08-04 PROCEDURE — 85097 BONE MARROW INTERPRETATION: CPT | Performed by: STUDENT IN AN ORGANIZED HEALTH CARE EDUCATION/TRAINING PROGRAM

## 2023-08-07 ENCOUNTER — OFFICE VISIT (OUTPATIENT)
Dept: HEMATOLOGY ONCOLOGY | Facility: CLINIC | Age: 78
End: 2023-08-07
Payer: COMMERCIAL

## 2023-08-07 VITALS
OXYGEN SATURATION: 97 % | BODY MASS INDEX: 32.25 KG/M2 | WEIGHT: 182 LBS | SYSTOLIC BLOOD PRESSURE: 130 MMHG | DIASTOLIC BLOOD PRESSURE: 82 MMHG | HEIGHT: 63 IN | HEART RATE: 57 BPM | RESPIRATION RATE: 17 BRPM | TEMPERATURE: 97.7 F

## 2023-08-07 DIAGNOSIS — D47.3 ESSENTIAL THROMBOCYTHEMIA (HCC): Primary | ICD-10-CM

## 2023-08-07 DIAGNOSIS — D64.9 ANEMIA, UNSPECIFIED TYPE: ICD-10-CM

## 2023-08-07 PROCEDURE — 99214 OFFICE O/P EST MOD 30 MIN: CPT | Performed by: INTERNAL MEDICINE

## 2023-08-07 RX ORDER — HYDROXYUREA 500 MG/1
500 CAPSULE ORAL DAILY
Qty: 90 CAPSULE | Refills: 0 | Status: SHIPPED | OUTPATIENT
Start: 2023-08-07

## 2023-08-08 ENCOUNTER — OFFICE VISIT (OUTPATIENT)
Dept: INTERNAL MEDICINE CLINIC | Age: 78
End: 2023-08-08
Payer: COMMERCIAL

## 2023-08-08 VITALS
WEIGHT: 179 LBS | BODY MASS INDEX: 31.71 KG/M2 | OXYGEN SATURATION: 96 % | DIASTOLIC BLOOD PRESSURE: 60 MMHG | SYSTOLIC BLOOD PRESSURE: 116 MMHG | HEART RATE: 58 BPM | HEIGHT: 63 IN | TEMPERATURE: 98.6 F

## 2023-08-08 DIAGNOSIS — E66.01 OBESITY, MORBID (HCC): ICD-10-CM

## 2023-08-08 DIAGNOSIS — I65.23 CAROTID ARTERY STENOSIS, ASYMPTOMATIC, BILATERAL: ICD-10-CM

## 2023-08-08 DIAGNOSIS — Z59.9 FINANCIAL DIFFICULTIES: Primary | ICD-10-CM

## 2023-08-08 DIAGNOSIS — Z00.00 MEDICARE ANNUAL WELLNESS VISIT, SUBSEQUENT: ICD-10-CM

## 2023-08-08 DIAGNOSIS — D47.3 ESSENTIAL THROMBOCYTHEMIA (HCC): ICD-10-CM

## 2023-08-08 DIAGNOSIS — E11.22 TYPE 2 DIABETES MELLITUS WITH STAGE 3A CHRONIC KIDNEY DISEASE, WITHOUT LONG-TERM CURRENT USE OF INSULIN (HCC): ICD-10-CM

## 2023-08-08 DIAGNOSIS — I10 BENIGN ESSENTIAL HTN: ICD-10-CM

## 2023-08-08 DIAGNOSIS — N18.31 TYPE 2 DIABETES MELLITUS WITH STAGE 3A CHRONIC KIDNEY DISEASE, WITHOUT LONG-TERM CURRENT USE OF INSULIN (HCC): ICD-10-CM

## 2023-08-08 DIAGNOSIS — I63.9 CEREBELLAR STROKE (HCC): ICD-10-CM

## 2023-08-08 DIAGNOSIS — E78.00 HYPERCHOLESTEROLEMIA: ICD-10-CM

## 2023-08-08 DIAGNOSIS — N18.31 STAGE 3A CHRONIC KIDNEY DISEASE (HCC): ICD-10-CM

## 2023-08-08 PROCEDURE — G0439 PPPS, SUBSEQ VISIT: HCPCS | Performed by: INTERNAL MEDICINE

## 2023-08-08 PROCEDURE — 99214 OFFICE O/P EST MOD 30 MIN: CPT | Performed by: INTERNAL MEDICINE

## 2023-08-08 RX ORDER — LOSARTAN POTASSIUM 50 MG/1
50 TABLET ORAL DAILY
Qty: 90 TABLET | Refills: 1 | Status: SHIPPED | OUTPATIENT
Start: 2023-08-08

## 2023-08-08 RX ORDER — CARVEDILOL 25 MG/1
25 TABLET ORAL 2 TIMES DAILY WITH MEALS
Qty: 180 TABLET | Refills: 1 | Status: SHIPPED | OUTPATIENT
Start: 2023-08-08

## 2023-08-08 RX ORDER — OXYCODONE HYDROCHLORIDE 5 MG/1
5 TABLET ORAL EVERY 4 HOURS PRN
COMMUNITY
Start: 2023-07-14

## 2023-08-08 RX ORDER — CLOPIDOGREL BISULFATE 75 MG/1
75 TABLET ORAL DAILY
Qty: 90 TABLET | Refills: 1 | Status: SHIPPED | OUTPATIENT
Start: 2023-08-08

## 2023-08-08 SDOH — ECONOMIC STABILITY - INCOME SECURITY: PROBLEM RELATED TO HOUSING AND ECONOMIC CIRCUMSTANCES, UNSPECIFIED: Z59.9

## 2023-08-08 NOTE — PROGRESS NOTES
Hematology/Oncology Outpatient Follow- up Note  Gracie Ramos 78 y.o. male MRN: @ Encounter: [id number]        Date:  8/7/2023      HPI: Kathryn Rose is a 78years old male with past medical history remarkable for anxiety, CAD, MI, CKD, GERD, hypertension, hyperlipidemia, gout, cerebellar stroke on Plavix, with no residual deficit, recent left total shoulder arthroplasty July 13, 2023, presenting today with his wife for follow-up mainly on his recently diagnosed essential thrombocythemia. Interval History:    Kathryn Rose has a left total history of total shoulder arthroplasty on 7/13 with his Plavix was temporarily held allowing bone marrow biopsy confirming CALR mutation/essential thrombocythemia diagnosis, no evidence of other bone marrow malignancy/lymphoma or leukemia    his left shoulder healing with gradually improving range of motion denies any fever or swelling    Recent biopsy/genetics/molecular and blood work reviewed, hemoglobin 11.2 and platelets 286 K, he is back on Plavix. Denies any abnormal bruising or bleeding issues. Benefits versus risk, possible side effects, of starting Hydrea is explained and patient is agreeable to starting Hydrea 500 mg daily with weekly CBC and close monitoring will come back in a month for further evaluation. Hemoglobin 11.2 denies any active or recent GI symptoms, blood in the stool or abnormal bleeding, except of occasional blood on wiping in the past due to known history of hemorrhoids, he had colonoscopy 10/2021 and reportedly unremarkable; will check iron panel with CBC prior to next visit        Assessment / Plan:    1. Essential thrombocythemia ([address])  - CBC and differential weekly X4 times  - Currently on Plavix  - hydroxyurea (HYDREA) 500 mg capsule; Take 1 capsule (500 mg total) by mouth daily  Dispense: 90 capsule; Refill: 0    2.  Anemia, unspecified type  Normocytic, B12 542, folate 5.8    Colonoscopy 10/2021 reportedly unremarkable, has known history of hemorrhoids, lately stable with no symptoms or bleeding    His anemia possibly due to chronic disease, CKD, need to rule out iron deficiency    - Iron Panel (Includes Ferritin, Iron Sat%, Iron, and TIBC); Future    Cancer Staging:  Cancer Staging   No matching staging information was found for the patient. Molecular Testing:     Previous Hematologic/ Oncologic History:    Oncology History   Essential thrombocythemia ([address])   6/12/2023 Initial Diagnosis    Essential thrombocythemia ([address])    Elevated platelets noted since April 2019  JAK2 V617F, MPL, E 12-15 with no mutation detected   CALR mutation positive: Deletion of approximately 52 BP         7/24/2023 Biopsy    Bone marrow biopsy, confirming essential thrombocythemia, CALR mutation  Flow cytometry analysis, chromosome analysis and FISH analysis MPN otherwis unremarkable     8/7/2023 -  Chemotherapy    Starting hydroxyurea 500 mg daily          Current Hematologic/ Oncologic Treatment:       Cycle 1         Test Results:    Imaging: XR shoulder 2+ vw left    Result Date: 7/26/2023  Narrative: This result has an attachment that is not available. X-rays taken AP Y left shoulder.  I reviewed and interpreted x-rays.   Status post reverse shoulder arthroplasty good position no loosening.   Skin staples present Report limited to Orthopedic interpretation    IR biopsy bone marrow    Result Date: 7/24/2023  Narrative: PROCEDURE: CT-guided bone marrow aspiration and core biopsy PROCEDURAL PERSONNEL: Attending physician(s): Rosa Isela Leo MD Resident physician(s): None Advanced practice provider(s): None INDICATION: D47.3: Essential (hemorrhagic) thrombocythemia Z15.89: Genetic susceptibility to other disease. COMPLICATIONS: No immediate complications. ESTIMATED BLOOD LOSS (mL): Less than 10 CONTRAST: None.  RADIATION DOSE (DLP): 493.86 mGy-cm ANESTHESIA/SEDATION: Level of anesthesia/sedation: Moderate sedation (conscious sedation) Anesthesia/sedation administered by: Independent trained observer under attending supervision with continuous monitoring of the patient's level of consciousness and physiologic status Total intra-service sedation time (minutes): 15 minutes PROCEDURE DETAILS: Informed consent for the procedure including risks, benefits and alternatives was obtained and time-out was performed prior to the procedure. The site was prepared and draped using maximal sterile barrier technique including cutaneous antisepsis. Using CT guidance, the Finomial system was used to perform bone marrow aspiration via the right posterior iliac bone. Approximately 10 mL of marrow was aspirated and submitted to pathology. Next, bone marrow biopsy was performed. The biopsy specimen was  placed in formalin. The needle was then removed and hemostasis was achieved using manual compression. Impression: Successful bone marrow aspiration and core biopsy of right ileum. Workstation performed: [id number]     XR shoulder 1 vw left    Result Date: 7/13/2023  Narrative: History: Total left shoulder replacement. Single view left of the left shoulder demonstrates a total left shoulder replacement. There is some subcutaneous air and skin staples. Impression: IMPRESSION: Satisfactory immediate postop appearance with total shoulder replacement Workstation:[id number]    VAS abdominal aorta/iliacs; complete study    Result Date: 7/11/2023  Narrative:  [facility] REPORT CLINICAL: Indications: Patient presents with left sided hip/back discomfort while walking and bilateral leg heaviness. Operative History: 2005-05-17 CABG x 3 Risk Factors: Obesity, HTN, Diabetes, Hyperlipidemia, CKD and CAD. Clinical Left Pressure:  128/68 mm Hg.   FINDINGS:  Unilateral     Impression  PSV (cm/s)  EDV (cm/s)  AP (cm)  TRV (cm)  Sup-Kareen Ao                         63          13      2.4            Px Inf-Nik Ao                      75           0      1.9       1.9  Ds Inf-Nik Ao                      77 0      1.6       1.7  Celiac         >70%               301          81                     SMA Ostial                         90          14                     Prox. SMA                         223          35      0.9             Right           PSV (cm/s)  EDV (cm/s)  AP (cm)   RAR  Prox ANDRZEJ               133           0      1.4        Dist ANDRZEJ               163           5      1.1        Internal Iliac          99           0                 Prox. EIA              115           2      1.1        Dist EIA               115           0      1.1        Prox Renal             117          32           1.87   Left            PSV (cm/s)  EDV (cm/s)  AP (cm)   RAR  Prox ANDRZEJ               143           0      1.2        Dist ANDRZEJ               210           3      1.2        Internal Iliac         149           0                 Prox. EIA              127           0      1.2        Dist EIA               117           2      1.1        Prox Renal              81          17           1.29     CONCLUSION:  Impression The aorta has diffuse calcified plaque without significant stenosis and normal caliber, 2.4 cm in its greatest diameter at the supraceliac segment (Prior: 2.3 cm). The common, internal and external iliac arteries are patent and normal in caliber. 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 The superior mesenteric and b/l renal arteries are patent. Compared to previous study on 1/26/2018, there is no significant change.   SIGNATURE: Electronically Signed by: Shakira Peres on 2023-07-11 01:12:14 PM            Labs:   Lab Results   Component Value Date    WBC 9.83 07/24/2023    HGB 11.2 (L) 07/24/2023    HCT 33.8 (L) 07/24/2023    MCV 92 07/24/2023     (H) 07/24/2023     Lab Results   Component Value Date     07/21/2015    K 5.8 (H) 06/12/2023     06/12/2023    CO2 22 06/12/2023    ANIONGAP 11 07/21/2015    BUN 20 06/12/2023    CREATININE 1.24 06/12/2023 GLUCOSE 95 07/21/2015    GLUF 167 (H) 07/13/2021    CALCIUM 9.3 07/13/2021    AST 18 06/12/2023    ALT 13 06/12/2023    ALKPHOS 55 07/13/2021    PROT 7.1 07/21/2015    BILITOT 0.53 07/21/2015    EGFR 59 (L) 06/12/2023         No results found for: "SPEP", "UPEP"    Lab Results   Component Value Date    PSA 2.2 02/18/2020    PSA 2.7 10/15/2018    PSA 2.5 07/21/2015       No results found for: "CEA"    No results found for: ""    No results found for: "AFP"    No results found for: "IRON", "TIBC", "FERRITIN"    Lab Results   Component Value Date    [id number] 502 06/12/2023         ROS: Review of Systems  - GENERAL: Negative for any nausea, vomiting, fevers, chills, or weight loss. - HEENT: Negative for any head/Neck trauma, pain, double/blurry vision, sinusitis, rhinitis, nose bleeding.  - CARDIAC: Negative for any chest pain, palpitation, Dyspnea on exertion, peripheral edema. - PULMONARY: Negative for any SOB, cough, wheezing.   - GASTROINTESTINAL: Negative for any abdominal pain, N/V/D/C, blood in stool.   - GENITOURINARY: Negative for any dysuria, hematuria, incontinence.  - NEUROLOGIC: Negative for any muscle weakness, numbness/tingling, memory changes. - MUSCULOSKELETAL: Left shoulder pain gradually improving after recent arthroplasty, otherwise negative for any joint pains/swelling, limited ROM. - INTEGUMENTARY: Negative for any rashes, cuts/ lesions.  - HEMATOLOGIC: Negative for any abnormal bruising, frequent infections or bleeding. Current Medications: Reviewed  Allergies: Reviewed  PMH/FH/SH:  Reviewed      Physical Exam:    Body surface area is 1.86 meters squared.     Wt Readings from Last 3 Encounters:   08/07/23 82.6 kg (182 lb)   07/24/23 81.6 kg (180 lb)   06/28/23 83.9 kg (185 lb)        Temp Readings from Last 3 Encounters:   08/07/23 97.7 °F (36.5 °C)   07/24/23 97.7 °F (36.5 °C) (Temporal)   06/28/23 97.8 °F (36.6 °C) (Temporal)        BP Readings from Last 3 Encounters: 08/07/23 130/82   07/24/23 121/85   06/28/23 128/68         Pulse Readings from Last 3 Encounters:   08/07/23 57   07/24/23 (!) 107   06/28/23 (!) 54     @LASTSAO2(3)@      Physical Exam  - GEN: Appears well, alert and oriented x 3, pleasant and cooperative, in no acute distress  - HEENT: Anicteric, mucous membranes moist, PERRL and EOMI   - NECK: No lymphadenopathy, JVD or carotid bruits   - HEART: RRR, normal S1 and S2, no murmurs, clicks, gallops or rubs   - LUNGS: Clear to auscultation bilaterally; no wheezes, rales, or rhonchi  - ABDOMEN: Normal bowel sounds, soft, no tenderness, no distention, no organomegaly or masses felt on exam.   - EXTREMITIES: Peripheral pulses normal; no clubbing, cyanosis, or edema  - NEURO: No focal findings, CN II-XII are grossly intact. - Musculoskeletal: Left shoulder movement, relatively limited due to recent surgery/reported pain, otherwise 5/5 strength, normal ROM, no swollen or erythematous joints. - SKIN: Normal without suspicious lesions on exposed skin      Goals and Barriers:  Current Goal: Prolong Survival from Cancer. Barriers: None. Patient's Capacity to Self Care:  Patient is able to self care.     Code Status: [unfilled]  Advance Directive and Living Will: Yes    Power of :      Jayleen Cline,    Hematology and Medical Oncology - PGY [place]

## 2023-08-08 NOTE — ASSESSMENT & PLAN NOTE
Carotid Doppler in July 2023 reveals less than 50% bilateral stenosis. With the homogeneous plaque. We will continue with statin and aspirin.

## 2023-08-08 NOTE — ASSESSMENT & PLAN NOTE
Lab Results   Component Value Date    EGFR 59 (L) 06/12/2023    EGFR 52 (L) 05/23/2023    EGFR 53 (L) 05/01/2023    CREATININE 1.24 06/12/2023    CREATININE 1.38 (H) 05/23/2023    CREATININE 1.35 (H) 05/01/2023   Renal function is stable.   We will continue to monitor

## 2023-08-08 NOTE — ASSESSMENT & PLAN NOTE
Lab Results   Component Value Date    HGBA1C 6.0 (H) 05/01/2023   Well controlled with diabetic diet. We will continue to monitor.   Continue with diabetic diet and exercise

## 2023-08-08 NOTE — PATIENT INSTRUCTIONS
Medicare Preventive Visit Patient Instructions  Thank you for completing your Welcome to Medicare Visit or Medicare Annual Wellness Visit today. Your next wellness visit will be due in one year (8/8/2024). The screening/preventive services that you may require over the next 5-10 years are detailed below. Some tests may not apply to you based off risk factors and/or age. Screening tests ordered at today's visit but not completed yet may show as past due. Also, please note that scanned in results may not display below. Preventive Screenings:  Service Recommendations Previous Testing/Comments   Colorectal Cancer Screening  · Colonoscopy    · Fecal Occult Blood Test (FOBT)/Fecal Immunochemical Test (FIT)  · Fecal DNA/Cologuard Test  · Flexible Sigmoidoscopy Age: 43-73 years old   Colonoscopy: every 10 years (May be performed more frequently if at higher risk)  OR  FOBT/FIT: every 1 year  OR  Cologuard: every 3 years  OR  Sigmoidoscopy: every 5 years  Screening may be recommended earlier than age 39 if at higher risk for colorectal cancer. Also, an individualized decision between you and your healthcare provider will decide whether screening between the ages of 77-80 would be appropriate.  Colonoscopy: 10/13/2021  FOBT/FIT: Not on file  Cologuard: Not on file  Sigmoidoscopy: Not on file    Screening Current     Prostate Cancer Screening Individualized decision between patient and health care provider in men between ages of 53-66   Medicare will cover every 12 months beginning on the day after your 50th birthday PSA: 2.2 ng/mL     Screening Not Indicated     Hepatitis C Screening Once for adults born between 1945 and 1965  More frequently in patients at high risk for Hepatitis C Hep C Antibody: 02/18/2020    Screening Current   Diabetes Screening 1-2 times per year if you're at risk for diabetes or have pre-diabetes Fasting glucose: 167 mg/dL (7/13/2021)  A1C: 6.0 % (5/1/2023)  Screening Not Indicated  History Diabetes Cholesterol Screening Once every 5 years if you don't have a lipid disorder. May order more often based on risk factors. Lipid panel: 05/01/2023  Screening Not Indicated  History Lipid Disorder      Other Preventive Screenings Covered by Medicare:  1. Abdominal Aortic Aneurysm (AAA) Screening: covered once if your at risk. You're considered to be at risk if you have a family history of AAA or a male between the age of 70-76 who smoking at least 100 cigarettes in your lifetime. 2. Lung Cancer Screening: covers low dose CT scan once per year if you meet all of the following conditions: (1) Age 48-67; (2) No signs or symptoms of lung cancer; (3) Current smoker or have quit smoking within the last 15 years; (4) You have a tobacco smoking history of at least 20 pack years (packs per day x number of years you smoked); (5) You get a written order from a healthcare provider. 3. Glaucoma Screening: covered annually if you're considered high risk: (1) You have diabetes OR (2) Family history of glaucoma OR (3)  aged 48 and older OR (3)  American aged 72 and older  3. Osteoporosis Screening: covered every 2 years if you meet one of the following conditions: (1) Have a vertebral abnormality; (2) On glucocorticoid therapy for more than 3 months; (3) Have primary hyperparathyroidism; (4) On osteoporosis medications and need to assess response to drug therapy. 5. HIV Screening: covered annually if you're between the age of 14-79. Also covered annually if you are younger than 13 and older than 72 with risk factors for HIV infection. For pregnant patients, it is covered up to 3 times per pregnancy.     Immunizations:  Immunization Recommendations   Influenza Vaccine Annual influenza vaccination during flu season is recommended for all persons aged >= 6 months who do not have contraindications   Pneumococcal Vaccine   * Pneumococcal conjugate vaccine = PCV13 (Prevnar 13), PCV15 (Vaxneuvance), PCV20 (Prevnar 20)  * Pneumococcal polysaccharide vaccine = PPSV23 (Pneumovax) Adults 2364 years old: 1-3 doses may be recommended based on certain risk factors  Adults 72 years old: 1-2 doses may be recommended based off what pneumonia vaccine you previously received   Hepatitis B Vaccine 3 dose series if at intermediate or high risk (ex: diabetes, end stage renal disease, liver disease)   Tetanus (Td) Vaccine - COST NOT COVERED BY MEDICARE PART B Following completion of primary series, a booster dose should be given every 10 years to maintain immunity against tetanus. Td may also be given as tetanus wound prophylaxis. Tdap Vaccine - COST NOT COVERED BY MEDICARE PART B Recommended at least once for all adults. For pregnant patients, recommended with each pregnancy. Shingles Vaccine (Shingrix) - COST NOT COVERED BY MEDICARE PART B  2 shot series recommended in those aged 48 and above     Health Maintenance Due:      Topic Date Due   • Colorectal Cancer Screening  10/13/2026   • Hepatitis C Screening  Completed     Immunizations Due:      Topic Date Due   • COVID-19 Vaccine (6 - Pfizer series) 07/09/2022   • Influenza Vaccine (1) 09/01/2023     Advance Directives   What are advance directives? Advance directives are legal documents that state your wishes and plans for medical care. These plans are made ahead of time in case you lose your ability to make decisions for yourself. Advance directives can apply to any medical decision, such as the treatments you want, and if you want to donate organs. What are the types of advance directives? There are many types of advance directives, and each state has rules about how to use them. You may choose a combination of any of the following:  · Living will: This is a written record of the treatment you want. You can also choose which treatments you do not want, which to limit, and which to stop at a certain time. This includes surgery, medicine, IV fluid, and tube feedings. · Durable power of  for [facility]): This is a written record that states who you want to make healthcare choices for you when you are unable to make them for yourself. This person, called a proxy, is usually a family member or a friend. You may choose more than 1 proxy. · Do not resuscitate (DNR) order:  A DNR order is used in case your heart stops beating or you stop breathing. It is a request not to have certain forms of treatment, such as CPR. A DNR order may be included in other types of advance directives. · Medical directive: This covers the care that you want if you are in a coma, near death, or unable to make decisions for yourself. You can list the treatments you want for each condition. Treatment may include pain medicine, surgery, blood transfusions, dialysis, IV or tube feedings, and a ventilator (breathing machine). · Values history: This document has questions about your views, beliefs, and how you feel and think about life. This information can help others choose the care that you would choose. Why are advance directives important? An advance directive helps you control your care. Although spoken wishes may be used, it is better to have your wishes written down. Spoken wishes can be misunderstood, or not followed. Treatments may be given even if you do not want them. An advance directive may make it easier for your family to make difficult choices about your care. Cigarette Smoking and Your Health   Risks to your health if you smoke:  Nicotine and other chemicals found in tobacco damage every cell in your body. Even if you are a light smoker, you have an increased risk for cancer, heart disease, and lung disease. If you are pregnant or have diabetes, smoking increases your risk for complications. Benefits to your health if you stop smoking:   · You decrease respiratory symptoms such as coughing, wheezing, and shortness of breath.    · You reduce your risk for cancers of the lung, mouth, throat, kidney, bladder, pancreas, stomach, and cervix. If you already have cancer, you increase the benefits of chemotherapy. You also reduce your risk for cancer returning or a second cancer from developing. · You reduce your risk for heart disease, blood clots, heart attack, and stroke. · You reduce your risk for lung infections, and diseases such as pneumonia, asthma, chronic bronchitis, and emphysema. · Your circulation improves. More oxygen can be delivered to your body. If you have diabetes, you lower your risk for complications, such as kidney, artery, and eye diseases. You also lower your risk for nerve damage. Nerve damage can lead to amputations, poor vision, and blindness. · You improve your body's ability to heal and to fight infections. For more information and support to stop smoking:   · [email or web address]  Phone: [phone]  Web Address: www.Navendis  Weight Management   Why it is important to manage your weight:  Being overweight increases your risk of health conditions such as heart disease, high blood pressure, type 2 diabetes, and certain types of cancer. It can also increase your risk for osteoarthritis, sleep apnea, and other respiratory problems. Aim for a slow, steady weight loss. Even a small amount of weight loss can lower your risk of health problems. How to lose weight safely:  A safe and healthy way to lose weight is to eat fewer calories and get regular exercise. You can lose up about 1 pound a week by decreasing the number of calories you eat by 500 calories each day. Healthy meal plan for weight management:  A healthy meal plan includes a variety of foods, contains fewer calories, and helps you stay healthy. A healthy meal plan includes the following:  · Eat whole-grain foods more often. A healthy meal plan should contain fiber. Fiber is the part of grains, fruits, and vegetables that is not broken down by your body.  Whole-grain foods are healthy and provide extra fiber in your diet. Some examples of whole-grain foods are whole-wheat breads and pastas, oatmeal, brown rice, and bulgur. · Eat a variety of vegetables every day. Include dark, leafy greens such as spinach, kale, raúl greens, and mustard greens. Eat yellow and orange vegetables such as carrots, sweet potatoes, and winter squash. · Eat a variety of fruits every day. Choose fresh or canned fruit (canned in its own juice or light syrup) instead of juice. Fruit juice has very little or no fiber. · Eat low-fat dairy foods. Drink fat-free (skim) milk or 1% milk. Eat fat-free yogurt and low-fat cottage cheese. Try low-fat cheeses such as mozzarella and other reduced-fat cheeses. · Choose meat and other protein foods that are low in fat. Choose beans or other legumes such as split peas or lentils. Choose fish, skinless poultry (chicken or turkey), or lean cuts of red meat (beef or pork). Before you cook meat or poultry, cut off any visible fat. · Use less fat and oil. Try baking foods instead of frying them. Add less fat, such as margarine, sour cream, regular salad dressing and mayonnaise to foods. Eat fewer high-fat foods. Some examples of high-fat foods include french fries, doughnuts, ice cream, and cakes. · Eat fewer sweets. Limit foods and drinks that are high in sugar. This includes candy, cookies, regular soda, and sweetened drinks. Exercise:  Exercise at least 30 minutes per day on most days of the week. Some examples of exercise include walking, biking, dancing, and swimming. You can also fit in more physical activity by taking the stairs instead of the elevator or parking farther away from stores. Ask your healthcare provider about the best exercise plan for you.    Narcotic (Opioid) Safety    Use narcotics safely:  · Take prescribed narcotics exactly as directed  · Do not give narcotics to others or take narcotics that belong to someone else  · Do not mix narcotics without medicines or alcohol  · Do not drive or operate heavy machinery after you take the narcotic  · Monitor for side effects and notify your healthcare provider if you experienced side effects such as nausea, sleepiness, itching, or trouble thinking clearly. Manage constipation:    Constipation is the most common side effect of narcotic medicine. Constipation is when you have hard, dry bowel movements, or you go longer than usual between bowel movements. Tell your healthcare provider about all changes in your bowel movements while you are taking narcotics. He or she may recommend laxative medicine to help you have a bowel movement. He or she may also change the kind of narcotic you are taking, or change when you take it. The following are more ways you can prevent or relieve constipation:    · Drink liquids as directed. You may need to drink extra liquids to help soften and move your bowels. Ask how much liquid to drink each day and which liquids are best for you. · Eat high-fiber foods. This may help decrease constipation by adding bulk to your bowel movements. High-fiber foods include fruits, vegetables, whole-grain breads and cereals, and beans. Your healthcare provider or dietitian can help you create a high-fiber meal plan. Your provider may also recommend a fiber supplement if you cannot get enough fiber from food. · Exercise regularly. Regular physical activity can help stimulate your intestines. Walking is a good exercise to prevent or relieve constipation. Ask which exercises are best for you. · Schedule a time each day to have a bowel movement. This may help train your body to have regular bowel movements. Bend forward while you are on the toilet to help move the bowel movement out. Sit on the toilet for at least 10 minutes, even if you do not have a bowel movement. Store narcotics safely:   · Store narcotics where others cannot easily get them. Keep them in a locked cabinet or secure area.  Do not keep them in a purse or other bag you carry with you. A person may be looking for something else and find the narcotics. · Make sure narcotics are stored out of the reach of children. A child can easily overdose on narcotics. Narcotics may look like candy to a small child. The best way to dispose of narcotics: The laws vary by country and area. In the [facility], the best way is to return the narcotics through a take-back program. This program is offered by the [employer]). The following are options for using the program:  · Take the narcotics to a [employer]INIK collection site. The site is often a law enforcement center. Call your local law enforcement center for scheduled take-back days in your area. You will be given information on where to go if the collection site is in a different location. · Take the narcotics to an approved pharmacy or hospital.  A pharmacy or hospital may be set up as a collection site. You will need to ask if it is a DOMINIK collection site if you were not directed there. A pharmacy or doctor's office may not be able to take back narcotics unless it is a DOMINIK site. · Use a mail-back system. This means you are given containers to put the narcotics into. You will then mail them in the containers. · Use a take-back drop box. This is a place to leave the narcotics at any time. People and animals will not be able to get into the box. Your local law enforcement agency can tell you where to find a drop box in your area. Other ways to manage pain:   · Ask your healthcare provider about non-narcotic medicines to control pain. Nonprescription medicines include NSAIDs (such as ibuprofen) and acetaminophen. Prescription medicines include muscle relaxers, antidepressants, and steroids. · Pain may be managed without any medicines. Some ways to relieve pain include massage, aromatherapy, or meditation. Physical or occupational therapy may also help.     For more information:   · Drug Enforcement Administration  [address] [facility] [address] , [address]  Phone: 1- 262 - [phone]  Web Address: [employer] SchedulingBayhealth Hospital, Kent CampusLive [email or web address]    · [address] and Drug Administration  [address] , [address]  Phone: 9- 373 - [phone]  Web Address: http://CitySwag/     © Copyright [address] 2018 Information is for End User's use only and may not be sold, redistributed or otherwise used for commercial purposes. All illustrations and images included in CareNotes® are the copyrighted property of [employer]. or [address] [place] [address]  Type 2 Diabetes Management for Adults   AMBULATORY CARE:   Type 2 diabetes  is a disease that affects how your body uses glucose (sugar). Either your body cannot make enough insulin, or it cannot use the insulin correctly. It is important to keep diabetes controlled to prevent damage to your heart, blood vessels, and other organs. Management will help you feel well and enjoy your daily activities. Your diabetes care team providers can help you make a plan to fit diabetes care into your schedule. Your plan can change over time to fit your needs and your family's needs. Have someone call your local emergency number (911 in the [address]) if:   • You cannot be woken. • You have signs of diabetic ketoacidosis:     ? confusion, fatigue    ? vomiting    ? rapid heartbeat    ? fruity smelling breath    ? extreme thirst    ? dry mouth and skin    • You have any of the following signs of a heart attack:      ? Squeezing, pressure, or pain in your chest    ? You may  also have any of the following:     - Discomfort or pain in your back, neck, jaw, stomach, or arm    - Shortness of breath    - Nausea or vomiting    - Lightheadedness or a sudden cold sweat    • You have any of the following signs of a stroke:      ? Numbness or drooping on one side of your face     ? Weakness in an arm or leg    ?  Confusion or difficulty speaking    ? Dizziness, a severe headache, or vision loss    Call your doctor or diabetes care team provider if:   • You have a sore or wound that will not heal.    • You have a change in the amount you urinate. • Your blood sugar levels are higher than your target goals. • You often have lower blood sugar levels than your target goals. • Your skin is red, dry, warm, or swollen. • You have trouble coping with diabetes, or you feel anxious or depressed. • You have questions or concerns about your condition or care. What you need to know about high blood sugar levels:  High blood sugar levels may not cause any symptoms. You may feel more thirsty or urinate more often than usual. Over time, high blood sugar levels can damage your nerves, blood vessels, tissues, and organs. The following can increase your blood sugar levels:  • Large meals or large amounts of carbohydrates at one time    • Less physical activity    • Stress    • Illness    • A lower dose of diabetes medicine or insulin, or a late dose    What you need to know about low blood sugar levels:  Symptoms include feeling shaky, dizzy, irritable, or confused. You can prevent symptoms by keeping your blood sugar levels from going too low. • Treat a low blood sugar level right away:      ? Drink 4 ounces of juice or have 1 tube of glucose gel. ? Check your blood sugar level again 10 to 15 minutes later. ? When the level goes back to normal, eat a meal or snack to prevent another decrease. • Keep glucose gel, raisins, or hard candy with you at all times to treat a low blood sugar level. • Your blood sugar level can get too low if you take diabetes medicine or insulin and do not eat enough food. • If you use insulin, check your blood sugar level before you exercise. ? If your blood sugar level is below 100 mg/dL, eat 4 crackers or 2 ounces of raisins, or drink 4 ounces of juice. ?  Check your level every 30 minutes if you exercise longer than 1 hour. ? You may need a snack during or after exercise. What you can do to manage your blood sugar levels:   • Check your blood sugar levels as directed and as needed. Several items are available to use to check your levels. You may need to check by testing a drop of blood in a glucose monitor. You may instead be given a continuous glucose monitoring (CGM) device. The device is worn at all times. The CGM checks your blood sugar level every 5 minutes. It sends results to an electronic device such as a smart phone. A CGM can be used with or without an insulin pump. You and your diabetes care team providers will decide on the best method for you. The goal for blood sugar levels before meals  is between 80 and 130 mg/dL and 2 hours after eating  is lower than 180 mg/dL. • Make healthy food choices. Work with a dietitian to develop a meal plan that works for you and your schedule. A dietitian can help you learn how to eat the right amount of carbohydrates during your meals and snacks. Carbohydrates can raise your blood sugar level if you eat too many at one time. Examples of foods that contain carbohydrates are breads, cereals, rice, pasta, and sweets. • Eat high-fiber foods as directed. Fiber helps improve blood sugar levels. Fiber also lowers your risk for heart disease and other problems diabetes can cause. Examples of high-fiber foods include vegetables, whole-grain bread, and beans such as deleon beans. Your dietitian can tell you how much fiber to have each day. • Get regular physical activity. Physical activity can help you get to your target blood sugar level goal and manage your weight. Get at least 150 minutes of moderate to vigorous aerobic physical activity each week. Do not miss more than 2 days in a row. Do not sit longer than 30 minutes at a time. Your healthcare provider can help you create an activity plan.  The plan can include the best activities for you and can help you build your strength and endurance. • Maintain a healthy weight. Ask your team what a healthy weight is for you. A healthy weight can help you control diabetes and prevent heart disease. Ask your team to help you create a weight loss plan, if needed. Weight loss can help make a difference in managing diabetes. Your team will help you set a weight-loss goal, such as 10 to 15 pounds, or 5% of your extra weight. Together you and your team can set manageable weight loss goals. • Take your diabetes medicine or insulin as directed. You may need diabetes medicine, insulin, or both to help control your blood sugar levels. Your healthcare provider will teach you how and when to take your diabetes medicine or insulin. You will also be taught about side effects oral diabetes medicine can cause. Insulin may be injected or given through a pump or pen. You and your providers will decide on the best method for you:    ? An insulin pump  is an implanted device that gives your insulin 24 hours a day. An insulin pump prevents the need for multiple insulin injections in a day. ? An insulin pen  is a device prefilled with the right amount of insulin. ? You and your family members will be taught how to draw up and give insulin  if this is the best method for you. Your providers will also teach you how to dispose of needles and syringes. ? You will learn how much insulin you need  and when to give it. You will be taught when not to give insulin. You will also be taught what to do if your blood sugar level drops too low. This may happen if you take insulin and do not eat the right amount of carbohydrates. More ways to manage type 2 diabetes:   • Wear medical alert identification. Wear medical alert jewelry or carry a card that says you have diabetes. Ask your provider where to get these items. • Do not smoke.   Nicotine and other chemicals in cigarettes and cigars can cause lung and blood vessel damage. It also makes it more difficult to manage your diabetes. Ask your provider for information if you currently smoke and need help to quit. Do not use e-cigarettes or smokeless tobacco in place of cigarettes or to help you quit. They still contain nicotine. • Check your feet each day for cuts, scratches, calluses, or other wounds. Look for redness and swelling, and feel for warmth. Wear shoes that fit well. Check your shoes for rocks or other objects that can hurt your feet. Do not walk barefoot or wear shoes without socks. Wear cotton socks to help keep your feet dry. • Ask about vaccines you may need. You have a higher risk for serious illness if you get the flu, pneumonia, COVID-19, or hepatitis. Ask your provider if you should get vaccines to prevent these or other diseases, and when to get the vaccines. • Talk to your provider if you become stressed about diabetes care. Sometimes being able to fit diabetes care into your life can cause increased stress. The stress can cause you not to take care of yourself properly. Your care team providers can help by offering tips about self-care. Your providers may suggest you talk to a mental health provider who can listen and offer help with self-care issues. • Have your A1c checked as directed. Your provider may check your A1c every 3 months, or 2 times each year if your diabetes is controlled. An A1c test shows the average amount of sugar in your blood over the past 2 to 3 months. Your provider will tell you what your A1c level should be. • Have screening tests as directed. Your provider may recommend screening for complications of diabetes and other conditions that may develop.  Some screenings may begin right away and some may happen within the first 5 years of diagnosis:    ? Examples of diabetes complications  include kidney problems, high cholesterol, high blood pressure, blood vessel problems, eye problems, and sleep apnea. ? You may be screened for a low vitamin B level  if you take oral diabetes medicine for a long time. ? Women of childbearing years may be screened  for polycystic ovarian syndrome (PCOS). Follow up with your doctor or diabetes care team providers as directed: You may need to have blood tests done before your follow-up visit. The test results will show if changes need to be made in your treatment or self-care. Talk to your provider if you cannot afford your medicine. Write down your questions so you remember to ask them during your visits. © Copyright Hollie Johnson 2022 Information is for End User's use only and may not be sold, redistributed or otherwise used for commercial purposes. The above information is an  only. It is not intended as medical advice for individual conditions or treatments. Talk to your doctor, nurse or pharmacist before following any medical regimen to see if it is safe and effective for you.

## 2023-08-08 NOTE — ASSESSMENT & PLAN NOTE
Recently evaluated by oncologist and work-up for malignancy is negative. Was started on hydroxyurea this week.

## 2023-08-08 NOTE — PROGRESS NOTES
Assessment and Plan:     Problem List Items Addressed This Visit        Endocrine    Type 2 diabetes mellitus with stage 3a chronic kidney disease, without long-term current use of insulin ([address])       Lab Results   Component Value Date    HGBA1C 6.0 (H) 05/01/2023   Well controlled with diabetic diet. We will continue to monitor. Continue with diabetic diet and exercise         Relevant Orders    Hemoglobin A1C       Cardiovascular and Mediastinum    Benign essential HTN     Blood pressure is stable on present regimen         Relevant Medications    losartan (COZAAR) 50 mg tablet    carvedilol (COREG) 25 mg tablet    Carotid artery stenosis, asymptomatic, bilateral     Carotid Doppler in July 2023 reveals less than 50% bilateral stenosis. With the homogeneous plaque. We will continue with statin and aspirin. Hematopoietic and Hemostatic    Essential thrombocythemia [facility])     Recently evaluated by oncologist and work-up for malignancy is negative. Was started on hydroxyurea this week. Relevant Medications    clopidogrel (PLAVIX) 75 mg tablet       Genitourinary    CKD (chronic kidney disease) stage 3, GFR 30-59 ml/min ([facility])     Lab Results   Component Value Date    EGFR 59 (L) 06/12/2023    EGFR 52 (L) 05/23/2023    EGFR 53 (L) 05/01/2023    CREATININE 1.24 06/12/2023    CREATININE 1.38 (H) 05/23/2023    CREATININE 1.35 (H) 05/01/2023   Renal function is stable.   We will continue to monitor         Relevant Orders    Comprehensive metabolic panel       Other    Hypercholesterolemia    Relevant Medications    carvedilol (COREG) 25 mg tablet    Other Relevant Orders    Lipid Panel with Direct LDL reflex    Obesity, morbid ([address])     Advised for low caloric diet and exercise        Other Visit Diagnoses     Financial difficulties    -  Primary    Relevant Orders    Ambulatory referral to social work care management program    Cerebellar stroke [facility])        Relevant Medications    clopidogrel (PLAVIX) 75 mg tablet    Medicare annual wellness visit, subsequent               Preventive health issues were discussed with patient, and age appropriate screening tests were ordered as noted in patient's After Visit Summary. Personalized health advice and appropriate referrals for health education or preventive services given if needed, as noted in patient's After Visit Summary. History of Present Illness:     Patient presents for a Medicare Wellness Visit    Patient is here for regular follow-up. Also have blood work would like to discuss results. Patient Care Team:  Stewart Cavazos MD as PCP - General (Internal Medicine)  MD Rush Marin MD     Review of Systems:     Review of Systems   Constitutional: Negative for fatigue and fever. HENT: Negative for congestion, ear discharge, ear pain, postnasal drip, sinus pressure, sore throat, tinnitus and trouble swallowing. Eyes: Negative for discharge, itching and visual disturbance. Respiratory: Negative for cough and shortness of breath. Cardiovascular: Negative for chest pain and palpitations. Gastrointestinal: Negative for abdominal pain, diarrhea, nausea and vomiting. Endocrine: Negative for cold intolerance and polyuria. Genitourinary: Negative for difficulty urinating, dysuria and urgency. Musculoskeletal: Negative for arthralgias and neck pain. Skin: Negative for rash. Allergic/Immunologic: Negative for environmental allergies. Neurological: Negative for dizziness, weakness and headaches. Psychiatric/Behavioral: Negative for agitation and behavioral problems. The patient is not nervous/anxious.          Problem List:     Patient Active Problem List   Diagnosis   • Low vitamin D level   • Adolescent idiopathic scoliosis of thoracolumbar region   • Abnormal glucose   • Benign essential HTN   • BPH (benign prostatic hyperplasia)   • Hypercholesterolemia   • Scoliosis   • Coronary artery disease involving native coronary artery of native heart without angina pectoris   • Acute gouty arthritis   • BMI 35.0-35.9,adult   • Preop cardiovascular exam   • Dysplastic nevus   • Carotid artery stenosis, asymptomatic, bilateral   • CKD (chronic kidney disease) stage 3, GFR 30-59 ml/min ([facility])   • Slow transit constipation   • Obesity, morbid ([facility])   • Type 2 diabetes mellitus with stage 3a chronic kidney disease, without long-term current use of insulin ([facility])   • Essential thrombocythemia ([facility])   • Hyperkalemia   • Anemia      Past Medical and Surgical History:     Past Medical History:   Diagnosis Date   • Actinic keratosis     RESOLVED: [id number]   • Adolescent idiopathic scoliosis of thoracolumbar region 04/12/2018   • Allergic    • Anxiety    • Arthritis    • Basal cell carcinoma of back     RESOLVED: [id number]   • Basal cell carcinoma of skin of upper extremity, including shoulder    • BCC (basal cell carcinoma), trunk    • BCE (basal cell epithelioma), trunk     RESOLVED: [id number]   • Benign neoplasm of skin of face    • Chronic kidney disease    • COPD (chronic obstructive pulmonary disease) ([facility])    • Coronary artery disease involving native coronary artery of native heart without angina pectoris 10/16/2018   • Depression 10/06/2015   • Gastroesophageal reflux disease without esophagitis 04/21/2017   • GERD (gastroesophageal reflux disease)    • Hypertension    • Myocardial infarction [facility])    • Neoplasm of uncertain behavior of skin    • Nonmelanoma skin cancer     LAST ASSESSED: [id number]   • Pneumonia    • Scoliosis    • Seborrheic keratosis     RESOLVED: [id number]   • Stroke [facility])      Past Surgical History:   Procedure Laterality Date   • ANAL FISTULOTOMY  06/13/2011    DR. SANCHEZ   • CORONARY ARTERY BYPASS GRAFT  05/17/2005    TRIPLE;  [id number]  10/2009    DR. MARRERO   • IR BIOPSY BONE MARROW  7/24/2023   • JOINT REPLACEMENT      shoulder replacement   • KNEE SURGERY     • SHOULDER SURGERY  06/18/2015    JOINT REPLACEMENT; DR. Josiah Gallego   • SPINE SURGERY  12/2020    spine fusion      Family History:     Family History   Problem Relation Age of Onset   • Other Mother         BRAIN TUMOR   • No Known Problems Father    • Vascular Disease Brother    • Cancer Brother    • Dementia Brother    • Heart disease Brother    • COPD Brother    • Prostate cancer Brother    • Substance Abuse Brother    • Drug abuse Brother       Social History:     Social History     Socioeconomic History   • Marital status: /Civil Union     Spouse name: None   • Number of children: None   • Years of education: None   • Highest education level: None   Occupational History   • None   Tobacco Use   • Smoking status: Light Smoker     Types: Cigars   • Smokeless tobacco: Never   • Tobacco comments:     one cigar once in awhile   Vaping Use   • Vaping Use: Never used   Substance and Sexual Activity   • Alcohol use: Yes     Alcohol/week: 1.0 standard drink of alcohol     Types: 1 Glasses of wine per week     Comment: occasionally   • Drug use: No   • Sexual activity: Not Currently     Partners: Female     Birth control/protection: Male Sterilization   Other Topics Concern   • None   Social History Narrative   • None     Social Determinants of Health     Financial Resource Strain: Medium Risk (8/1/2023)    Overall Financial Resource Strain (CARDIA)    • Difficulty of Paying Living Expenses: Somewhat hard   Food Insecurity: Not on file   Transportation Needs: No Transportation Needs (8/1/2023)    PRAPARE - Transportation    • Lack of Transportation (Medical): No    • Lack of Transportation (Non-Medical):  No   Physical Activity: Not on file   Stress: Not on file   Social Connections: Not on file   Intimate Partner Violence: Not on file   Housing Stability: Not on file      Medications and Allergies:     Current Outpatient Medications   Medication Sig Dispense Refill   • allopurinol (ZYLOPRIM) 100 mg tablet Take 1 tablet (100 mg total) by mouth daily 90 tablet 3   • carvedilol (COREG) 25 mg tablet Take 1 tablet (25 mg total) by mouth 2 (two) times a day with meals 180 tablet 1   • cholecalciferol (VITAMIN D3) 1,000 units tablet Take 1,000 Units by mouth daily     • clopidogrel (PLAVIX) 75 mg tablet Take 1 tablet (75 mg total) by mouth daily 90 tablet 1   • hydroxyurea (HYDREA) 500 mg capsule Take 1 capsule (500 mg total) by mouth daily 90 capsule 0   • losartan (COZAAR) 50 mg tablet Take 1 tablet (50 mg total) by mouth daily 90 tablet 1   • Multiple Vitamin (MULTIVITAMIN ADULT PO) Take by mouth daily     • oxyCODONE (ROXICODONE) 5 immediate release tablet Take 5 mg by mouth every 4 (four) hours as needed     • rosuvastatin (CRESTOR) 10 MG tablet Take 1 tablet (10 mg total) by mouth daily at bedtime 90 tablet 1   • Thiamine HCl (VITAMIN B1 PO) Take 1 tablet by mouth in the morning     • albuterol (Ventolin HFA) 90 mcg/act inhaler Inhale 2 puffs every 6 (six) hours as needed for wheezing (Patient not taking: Reported on 6/28/2023) 18 g 1   • colchicine (COLCRYS) 0.6 mg tablet Take 1 tablet (0.6 mg total) by mouth daily (Patient not taking: Reported on 5/15/2023) 30 tablet 1   • Cyanocobalamin (VITAMIN B12 PO) Take 1 tablet by mouth in the morning (Patient not taking: Reported on 6/5/2023)     • ergocalciferol (VITAMIN D2) 50,000 units Take 1 capsule (50,000 Units total) by mouth once a week (Patient not taking: Reported on 5/15/2023) 12 capsule 0     No current facility-administered medications for this visit.      Allergies   Allergen Reactions   • Other      [place] trees      Immunizations:     Immunization History   Administered Date(s) Administered   • COVID-19 PFIZER VACCINE 0.3 ML IM 02/05/2021, 02/24/2021, 09/30/2021, 05/14/2022, 05/14/2022   • INFLUENZA 09/11/2018, 08/16/2020, 09/29/2021, 09/29/2021, 10/13/2022   • Influenza Split High Dose Preservative Free IM 11/03/2016   • Influenza, seasonal, injectable 10/13/2015, 09/27/2017   • Pneumococcal Conjugate 13-Valent 10/06/2015   • Pneumococcal Polysaccharide PPV23 05/13/2019   • Tdap 10/13/2015   • influenza, trivalent, adjuvanted 08/28/2019      Health Maintenance:         Topic Date Due   • Colorectal Cancer Screening  10/13/2026   • Hepatitis C Screening  Completed         Topic Date Due   • COVID-19 Vaccine (6 - Pfizer series) 07/09/2022   • Influenza Vaccine (1) 09/01/2023      Medicare Screening Tests and Risk Assessments:     Denny Tomas is here for his Subsequent Wellness visit. Last Medicare Wellness visit information reviewed, patient interviewed and updates made to the record today. Health Risk Assessment:   Patient rates overall health as good. Patient feels that their physical health rating is slightly worse. Patient is satisfied with their life. Eyesight was rated as same. Hearing was rated as same. Patient feels that their emotional and mental health rating is same. Patients states they are never, rarely angry. Patient states they are often unusually tired/fatigued. Pain experienced in the last 7 days has been a lot. Patient's pain rating has been 6/10. Patient states that he has experienced no weight loss or gain in last 6 months. Fall Risk Screening: In the past year, patient has experienced: no history of falling in past year      Home Safety:  Patient does not have trouble with stairs inside or outside of their home. Patient has working smoke alarms and has working carbon monoxide detector. Home safety hazards include: none. Nutrition:   Current diet is Regular and Limited junk food. Medications:   Patient is not currently taking any over-the-counter supplements. Patient is able to manage medications.      Activities of Daily Living (ADLs)/Instrumental Activities of Daily Living (IADLs):   Walk and transfer into and out of bed and chair?: Yes  Dress and groom yourself?: Yes    Bathe or shower yourself?: Yes    Feed yourself? Yes  Do your laundry/housekeeping?: Yes  Manage your money, pay your bills and track your expenses?: Yes  Make your own meals?: Yes    Do your own shopping?: Yes    Previous Hospitalizations:   Any hospitalizations or ED visits within the last 12 months?: Yes    How many hospitalizations have you had in the last year?: 1-2    Hospitalization Comments: shoulder replacement    Advance Care Planning:   Living will: No    Durable POA for healthcare: No    Advanced directive: No    Advanced directive counseling given: Yes    Five wishes given: Yes      Cognitive Screening:   Provider or family/friend/caregiver concerned regarding cognition?: No    PREVENTIVE SCREENINGS      Cardiovascular Screening:    General: Screening Not Indicated and History Lipid Disorder      Diabetes Screening:     General: Screening Not Indicated and History Diabetes      Colorectal Cancer Screening:     General: Screening Current      Prostate Cancer Screening:    General: Screening Not Indicated      Osteoporosis Screening:    General: Screening Not Indicated      Abdominal Aortic Aneurysm (AAA) Screening:    Risk factors include: tobacco use        General: Screening Not Indicated      Lung Cancer Screening:     General: Screening Not Indicated      Hepatitis C Screening:    General: Screening Current    Screening, Brief Intervention, and Referral to Treatment (SBIRT)    Screening  Typical number of drinks in a day: 0  Typical number of drinks in a week: 0  Interpretation: Low risk drinking behavior. AUDIT-C Screenin) How often did you have a drink containing alcohol in the past year? monthly or less  2) How many drinks did you have on a typical day when you were drinking in the past year?  1 to 2  3) How often did you have 6 or more drinks on one occasion in the past year? never    AUDIT-C Score: 1  Interpretation: Score 0-3 (male): Negative screen for alcohol misuse    Single Item Drug Screening:  How often have you used an illegal drug (including marijuana) or a prescription medication for non-medical reasons in the past year? never    Single Item Drug Screen Score: 0  Interpretation: Negative screen for possible drug use disorder    Brief Intervention  Alcohol & drug use screenings were reviewed. No concerns regarding substance use disorder identified. Review of Current Opioid Use    Opioid Risk Tool (ORT) Interpretation: Complete Opioid Risk Tool (ORT)    Other Counseling Topics:   Car/seat belt/driving safety, skin self-exam, sunscreen and calcium and vitamin D intake and regular weightbearing exercise. No results found. Physical Exam:     /60 (BP Location: Left arm, Patient Position: Sitting, Cuff Size: Standard)   Pulse 58   Temp 98.6 °F (37 °C) (Temporal)   Ht 5' 3" (1.6 m)   Wt 81.2 kg (179 lb)   SpO2 96%   BMI 31.71 kg/m²     Physical Exam  Vitals and nursing note reviewed. Constitutional:       General: He is not in acute distress. Appearance: He is well-developed. HENT:      Head: Normocephalic and atraumatic. Right Ear: There is no impacted cerumen. Left Ear: There is no impacted cerumen. Nose: No rhinorrhea. Mouth/Throat:      Pharynx: No posterior oropharyngeal erythema. Eyes:      Conjunctiva/sclera: Conjunctivae normal.   Cardiovascular:      Rate and Rhythm: Normal rate and regular rhythm. Heart sounds: No murmur heard. Pulmonary:      Effort: Pulmonary effort is normal. No respiratory distress. Breath sounds: Normal breath sounds. Abdominal:      Palpations: Abdomen is soft. Tenderness: There is no abdominal tenderness. Musculoskeletal:         General: No swelling. Cervical back: Neck supple. Right lower leg: No edema. Left lower leg: No edema. Skin:     General: Skin is warm and dry. Capillary Refill: Capillary refill takes less than 2 seconds.    Neurological:      Mental Status: He is alert and oriented to person, place, and time.    Psychiatric:         Mood and Affect: Mood normal.          Melissa Colorado MD

## 2023-08-10 ENCOUNTER — PATIENT OUTREACH (OUTPATIENT)
Dept: INTERNAL MEDICINE CLINIC | Facility: OTHER | Age: 78
End: 2023-08-10

## 2023-08-10 NOTE — PROGRESS NOTES
OP CM called to pt in regards to SDOH referral.  Explained OP CM role. Pt states he and his wife do not have any needs at this time.

## 2023-08-17 LAB
BASOPHILS # BLD AUTO: 0 X10E3/UL
BASOPHILS NFR BLD AUTO: 0 %
EOSINOPHIL # BLD AUTO: 0.3 X10E3/UL
EOSINOPHIL NFR BLD AUTO: 4 %
ERYTHROCYTE [DISTWIDTH] IN BLOOD BY AUTOMATED COUNT: 15 %
FERRITIN SERPL-MCNC: 77 NG/ML
HCT VFR BLD AUTO: 40.8 %
HGB BLD-MCNC: 13.3 G/DL
IMM GRANULOCYTES # BLD: 0.1 X10E3/UL
IMM GRANULOCYTES NFR BLD: 1 %
IRON SATN MFR SERPL: 17 % (ref 15–55)
IRON SERPL-MCNC: 53 UG/DL
LYMPHOCYTES # BLD AUTO: 1.5 X10E3/UL
LYMPHOCYTES NFR BLD AUTO: 19 %
MCH RBC QN AUTO: 30.4 PG
MCHC RBC AUTO-ENTMCNC: 32.6 G/DL
MCV RBC AUTO: 93 FL
MONOCYTES # BLD AUTO: 0.6 X10E3/UL
MONOCYTES NFR BLD AUTO: 7 %
NEUTROPHILS # BLD AUTO: 5.6 X10E3/UL
NEUTROPHILS NFR BLD AUTO: 69 %
PLATELET # BLD AUTO: 763 X10E3/UL (ref 150–450)
RBC # BLD AUTO: 4.37 X10E6/UL
TIBC SERPL-MCNC: 305 UG/DL (ref 250–450)
UIBC SERPL-MCNC: 252 UG/DL
WBC # BLD AUTO: 8.2 X10E3/UL (ref 3.4–10.8)

## 2023-08-29 LAB
BASOPHILS # BLD AUTO: 0 X10E3/UL (ref 0–0.2)
BASOPHILS NFR BLD AUTO: 0 %
EOSINOPHIL # BLD AUTO: 0.3 X10E3/UL (ref 0–0.4)
EOSINOPHIL NFR BLD AUTO: 3 %
ERYTHROCYTE [DISTWIDTH] IN BLOOD BY AUTOMATED COUNT: 15.4 % (ref 11.6–15.4)
FERRITIN SERPL-MCNC: 74 NG/ML (ref 30–400)
HCT VFR BLD AUTO: 41.4 % (ref 37.5–51)
HGB BLD-MCNC: 13.8 G/DL (ref 13–17.7)
IMM GRANULOCYTES # BLD: 0.1 X10E3/UL (ref 0–0.1)
IMM GRANULOCYTES NFR BLD: 1 %
IRON SATN MFR SERPL: 21 % (ref 15–55)
IRON SERPL-MCNC: 67 UG/DL (ref 38–169)
LYMPHOCYTES # BLD AUTO: 2 X10E3/UL (ref 0.7–3.1)
LYMPHOCYTES NFR BLD AUTO: 21 %
MCH RBC QN AUTO: 30.6 PG (ref 26.6–33)
MCHC RBC AUTO-ENTMCNC: 33.3 G/DL (ref 31.5–35.7)
MCV RBC AUTO: 92 FL (ref 79–97)
MISCELLANEOUS LAB TEST RESULT: NORMAL
MONOCYTES # BLD AUTO: 0.6 X10E3/UL (ref 0.1–0.9)
MONOCYTES NFR BLD AUTO: 7 %
NEUTROPHILS # BLD AUTO: 6.5 X10E3/UL (ref 1.4–7)
NEUTROPHILS NFR BLD AUTO: 68 %
PLATELET # BLD AUTO: 844 X10E3/UL (ref 150–450)
RBC # BLD AUTO: 4.51 X10E6/UL (ref 4.14–5.8)
TIBC SERPL-MCNC: 322 UG/DL (ref 250–450)
UIBC SERPL-MCNC: 255 UG/DL (ref 111–343)
WBC # BLD AUTO: 9.5 X10E3/UL (ref 3.4–10.8)

## 2023-09-01 LAB
BASOPHILS # BLD AUTO: 0 X10E3/UL (ref 0–0.2)
BASOPHILS NFR BLD AUTO: 0 %
EOSINOPHIL # BLD AUTO: 0.2 X10E3/UL (ref 0–0.4)
EOSINOPHIL NFR BLD AUTO: 3 %
ERYTHROCYTE [DISTWIDTH] IN BLOOD BY AUTOMATED COUNT: 15.8 % (ref 11.6–15.4)
FERRITIN SERPL-MCNC: 70 NG/ML (ref 30–400)
HCT VFR BLD AUTO: 39.3 % (ref 37.5–51)
HGB BLD-MCNC: 13.4 G/DL (ref 13–17.7)
IMM GRANULOCYTES # BLD: 0 X10E3/UL (ref 0–0.1)
IMM GRANULOCYTES NFR BLD: 0 %
IRON SATN MFR SERPL: 24 % (ref 15–55)
IRON SERPL-MCNC: 76 UG/DL (ref 38–169)
LYMPHOCYTES # BLD AUTO: 1.9 X10E3/UL (ref 0.7–3.1)
LYMPHOCYTES NFR BLD AUTO: 25 %
MCH RBC QN AUTO: 31.2 PG (ref 26.6–33)
MCHC RBC AUTO-ENTMCNC: 34.1 G/DL (ref 31.5–35.7)
MCV RBC AUTO: 91 FL (ref 79–97)
MONOCYTES # BLD AUTO: 0.5 X10E3/UL (ref 0.1–0.9)
MONOCYTES NFR BLD AUTO: 7 %
NEUTROPHILS # BLD AUTO: 5 X10E3/UL (ref 1.4–7)
NEUTROPHILS NFR BLD AUTO: 65 %
PLATELET # BLD AUTO: 670 X10E3/UL (ref 150–450)
RBC # BLD AUTO: 4.3 X10E6/UL (ref 4.14–5.8)
TIBC SERPL-MCNC: 313 UG/DL (ref 250–450)
UIBC SERPL-MCNC: 237 UG/DL (ref 111–343)
WBC # BLD AUTO: 7.7 X10E3/UL (ref 3.4–10.8)

## 2023-09-13 ENCOUNTER — OFFICE VISIT (OUTPATIENT)
Dept: HEMATOLOGY ONCOLOGY | Facility: CLINIC | Age: 78
End: 2023-09-13
Payer: COMMERCIAL

## 2023-09-13 VITALS
DIASTOLIC BLOOD PRESSURE: 78 MMHG | BODY MASS INDEX: 32.46 KG/M2 | RESPIRATION RATE: 17 BRPM | OXYGEN SATURATION: 97 % | HEIGHT: 63 IN | SYSTOLIC BLOOD PRESSURE: 118 MMHG | TEMPERATURE: 97.7 F | HEART RATE: 52 BPM | WEIGHT: 183.2 LBS

## 2023-09-13 DIAGNOSIS — D47.3 ESSENTIAL THROMBOCYTHEMIA (HCC): Primary | ICD-10-CM

## 2023-09-13 PROCEDURE — 99214 OFFICE O/P EST MOD 30 MIN: CPT | Performed by: INTERNAL MEDICINE

## 2023-09-13 NOTE — PROGRESS NOTES
Progress Note: Hematology:  Adarsh Sheppard 78 y.o. male MRN: [id number]  Unit/Bed#:  Encounter: [id number]    Assessment and Plan:  1. Essential Thrombocythemia:  Patient is an 78-year-old male, with an established history of essential thrombocythemia, CALR mutation positive (Diagnosed in July 2023); who presents today for follow-up. Of note, patient was initiated on Hydroxyurea 500 mg Daily on August 7th. Unfortunately, patient developed an intensely pruritic, and erythematous rash of the bilateral palms, of which spontaneous resolved within 72-hours. This prompted him to independently discontinue Hydroxyurea. Discussed the above-mentioned at length with patient; including observation alone versus reinitiation of Hydroxyurea versus trial of alternative cytoreductive therapies (e.g. Anagrelide). Patient is opting for close observation of counts with his Primary Care Provider. He is to follow-up with Hematology, as needed.   1. Recommend continued follow-up of Platelet Count with Primary Care Provider.    A. Patient to follow-up with Hematology, as needed.     Subjective:  Interval History: Adarsh Sheppard is an 78-year-old male, with an established history of essential thrombocythemia, CALR mutation positive (Diagnosed in July 2023); who presents today for follow-up. Of particular importance, patient has documented thrombocytosis dating back to at least April 2019 (Baseline Platelet Count: 409-727); of which prompted bone marrow biopsy on July 24th, 2023. 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 On follow-up on August 7th, patient was initiated on Hydroxyurea 500 mg Daily. Oral antiplatelet therapy with Clopidogrel was continued, as well. Hematologic parameters have been monitored closely since initiation of Hydroxyurea; and demonstrate appropriate downtrend in platelet count (Platelet Count: 670). Unfortunately, patient developed an  intensely pruritic, and erythematous rash of the bilateral palms, of which spontaneous resolved within 72-hours. This prompted him to independently discontinue Hydroxyurea. He remains on Clopidogrel, as prescribed. On review of systems, patient denies fever, chills, nightsweats, unintentional weight-loss, vasomotor symptoms (e.g. Headache, visual-field changes, syncope, atypical chest pain, or transient visual field deficits), or complications associated with essential thrombocythemia, including excessive bleeding or clotting. He has no further questions or concerns at the completion of our encounter.    Review of Systems:  All systems reviewed and negative except otherwise listed in the History of Present Illness.    Laboratory-Studies:  Complete Blood Count reviewed from August 30th and shows thrombocytosis (Platelet Count: 670); improved from previous (Platelet Count on August 16th: 763 and August 23rd: 844). Iron Panel was reviewed and is unremarkable for evidence of iron deficiency (Iron: 76 TIBC: 313 Percent Saturation: 24 Ferritin: 70).    Objective:  Vitals:    09/13/23 1050   BP: 118/78   Pulse: (!) 52   Resp: 17   Temp: 97.7 °F (36.5 °C)   SpO2: 97%     Physical Exam:  General: Alert, and oriented; Pleasant, and conversational; Well-Appearing Male (Appears Stated Age)  HEENT: Atraumatic, and normocephalic; PERRLA; EOMI; Moist mucosal membranes  Neck: Trachea midline; No neck masses, thyromegaly, or cervical lymphadenopathy present on my exam  Cardiovascular: Regular rate and rhythm; No murmurs, rubs, or gallops appreciated; No edema  Respiratory: Clear to auscultation bilaterally; Adequate aeration; No supplemental oxygen requirement  Abdomen: Soft, non-tender; Non-distended; No organomegaly appreciated; Bowel sounds present  Extremities: No obvious deformities; No peripheral edema; Moves all  Neurologic: Appropriately alert, and oriented to Person, Place, and Time; No focal neurologic deficits    Lab  Results: I have reviewed all pertinent labs.  Imaging: I have personally reviewed pertinent reports.    EKG, Pathology, and Other Studies: I have personally reviewed pertinent reports.      Patient was seen and discussed with attending physician, NEHEMIAH Baker D.O.  Hematology-Oncology Fellow (PGY-4)

## 2023-09-19 LAB
BASOPHILS # BLD AUTO: 0 X10E3/UL (ref 0–0.2)
BASOPHILS NFR BLD AUTO: 0 %
EOSINOPHIL # BLD AUTO: 0.2 X10E3/UL (ref 0–0.4)
EOSINOPHIL NFR BLD AUTO: 3 %
ERYTHROCYTE [DISTWIDTH] IN BLOOD BY AUTOMATED COUNT: 16.7 % (ref 11.6–15.4)
FERRITIN SERPL-MCNC: 86 NG/ML (ref 30–400)
HCT VFR BLD AUTO: 41.3 % (ref 37.5–51)
HGB BLD-MCNC: 13.8 G/DL (ref 13–17.7)
IMM GRANULOCYTES # BLD: 0.1 X10E3/UL (ref 0–0.1)
IMM GRANULOCYTES NFR BLD: 1 %
IRON SATN MFR SERPL: 20 % (ref 15–55)
IRON SERPL-MCNC: 63 UG/DL (ref 38–169)
LYMPHOCYTES # BLD AUTO: 1.9 X10E3/UL (ref 0.7–3.1)
LYMPHOCYTES NFR BLD AUTO: 24 %
MCH RBC QN AUTO: 31.2 PG (ref 26.6–33)
MCHC RBC AUTO-ENTMCNC: 33.4 G/DL (ref 31.5–35.7)
MCV RBC AUTO: 93 FL (ref 79–97)
MONOCYTES # BLD AUTO: 0.6 X10E3/UL (ref 0.1–0.9)
MONOCYTES NFR BLD AUTO: 8 %
NEUTROPHILS # BLD AUTO: 4.8 X10E3/UL (ref 1.4–7)
NEUTROPHILS NFR BLD AUTO: 64 %
PLATELET # BLD AUTO: 674 X10E3/UL (ref 150–450)
RBC # BLD AUTO: 4.42 X10E6/UL (ref 4.14–5.8)
TIBC SERPL-MCNC: 310 UG/DL (ref 250–450)
UIBC SERPL-MCNC: 247 UG/DL (ref 111–343)
WBC # BLD AUTO: 7.6 X10E3/UL (ref 3.4–10.8)

## 2023-11-01 DIAGNOSIS — D47.3 ESSENTIAL THROMBOCYTHEMIA (HCC): ICD-10-CM

## 2023-11-06 RX ORDER — HYDROXYUREA 500 MG/1
500 CAPSULE ORAL DAILY
Qty: 90 CAPSULE | Refills: 0 | Status: SHIPPED | OUTPATIENT
Start: 2023-11-06

## 2023-12-01 ENCOUNTER — TELEPHONE (OUTPATIENT)
Dept: INTERNAL MEDICINE CLINIC | Age: 78
End: 2023-12-01

## 2023-12-01 DIAGNOSIS — E87.5 HYPERKALEMIA: Primary | ICD-10-CM

## 2023-12-01 LAB
ALBUMIN SERPL-MCNC: 4.5 G/DL (ref 3.8–4.8)
ALBUMIN/GLOB SERPL: 2.5 {RATIO} (ref 1.2–2.2)
ALP SERPL-CCNC: 46 IU/L (ref 44–121)
ALT SERPL-CCNC: 15 IU/L (ref 0–44)
AST SERPL-CCNC: 20 IU/L (ref 0–40)
BILIRUB SERPL-MCNC: 0.5 MG/DL (ref 0–1.2)
BUN SERPL-MCNC: 21 MG/DL (ref 8–27)
BUN/CREAT SERPL: 20 (ref 10–24)
CALCIUM SERPL-MCNC: 9.2 MG/DL (ref 8.6–10.2)
CHLORIDE SERPL-SCNC: 105 MMOL/L (ref 96–106)
CHOLEST SERPL-MCNC: 186 MG/DL (ref 100–199)
CO2 SERPL-SCNC: 22 MMOL/L (ref 20–29)
CREAT SERPL-MCNC: 1.06 MG/DL (ref 0.76–1.27)
EGFR: 71 ML/MIN/1.73
GLOBULIN SER-MCNC: 1.8 G/DL (ref 1.5–4.5)
GLUCOSE SERPL-MCNC: 118 MG/DL (ref 70–99)
HBA1C MFR BLD: 6.5 % (ref 4.8–5.6)
HDLC SERPL-MCNC: 41 MG/DL
LDLC SERPL CALC-MCNC: 115 MG/DL (ref 0–99)
LDLC/HDLC SERPL: 2.8 RATIO (ref 0–3.6)
POTASSIUM SERPL-SCNC: 5.4 MMOL/L (ref 3.5–5.2)
PROT SERPL-MCNC: 6.3 G/DL (ref 6–8.5)
SL AMB VLDL CHOLESTEROL CALC: 30 MG/DL (ref 5–40)
SODIUM SERPL-SCNC: 142 MMOL/L (ref 134–144)
TRIGL SERPL-MCNC: 168 MG/DL (ref 0–149)

## 2023-12-01 RX ORDER — SODIUM POLYSTYRENE SULFONATE 15 G/60ML
15 SUSPENSION ORAL; RECTAL EVERY OTHER DAY
Qty: 180 ML | Refills: 0 | Status: SHIPPED | OUTPATIENT
Start: 2023-12-01 | End: 2023-12-06

## 2023-12-01 NOTE — TELEPHONE ENCOUNTER
Received message that medication:      sodium polystyrene sulfonate (KAYEXALATE) 15 g/60 mL suspension         Is too expensive. Asking for alternate.

## 2023-12-11 ENCOUNTER — OFFICE VISIT (OUTPATIENT)
Dept: INTERNAL MEDICINE CLINIC | Age: 78
End: 2023-12-11
Payer: COMMERCIAL

## 2023-12-11 VITALS
TEMPERATURE: 97.8 F | OXYGEN SATURATION: 96 % | HEART RATE: 57 BPM | HEIGHT: 63 IN | DIASTOLIC BLOOD PRESSURE: 76 MMHG | WEIGHT: 186 LBS | BODY MASS INDEX: 32.96 KG/M2 | SYSTOLIC BLOOD PRESSURE: 120 MMHG

## 2023-12-11 DIAGNOSIS — N18.31 TYPE 2 DIABETES MELLITUS WITH STAGE 3A CHRONIC KIDNEY DISEASE, WITHOUT LONG-TERM CURRENT USE OF INSULIN (HCC): Primary | ICD-10-CM

## 2023-12-11 DIAGNOSIS — E11.22 TYPE 2 DIABETES MELLITUS WITH STAGE 3A CHRONIC KIDNEY DISEASE, WITHOUT LONG-TERM CURRENT USE OF INSULIN (HCC): Primary | ICD-10-CM

## 2023-12-11 DIAGNOSIS — J42 CHRONIC BRONCHITIS, UNSPECIFIED CHRONIC BRONCHITIS TYPE (HCC): ICD-10-CM

## 2023-12-11 DIAGNOSIS — D47.3 ESSENTIAL THROMBOCYTHEMIA (HCC): ICD-10-CM

## 2023-12-11 DIAGNOSIS — I10 BENIGN ESSENTIAL HTN: ICD-10-CM

## 2023-12-11 DIAGNOSIS — N18.31 STAGE 3A CHRONIC KIDNEY DISEASE (HCC): ICD-10-CM

## 2023-12-11 DIAGNOSIS — E66.01 OBESITY, MORBID (HCC): ICD-10-CM

## 2023-12-11 DIAGNOSIS — Z01.810 PREOP CARDIOVASCULAR EXAM: ICD-10-CM

## 2023-12-11 DIAGNOSIS — E87.5 HYPERKALEMIA: ICD-10-CM

## 2023-12-11 PROCEDURE — 99214 OFFICE O/P EST MOD 30 MIN: CPT | Performed by: INTERNAL MEDICINE

## 2023-12-11 RX ORDER — AMLODIPINE BESYLATE 5 MG/1
5 TABLET ORAL DAILY
Qty: 90 TABLET | Refills: 2 | Status: SHIPPED | OUTPATIENT
Start: 2023-12-11

## 2023-12-11 NOTE — PROGRESS NOTES
Assessment/Plan:    Type 2 diabetes mellitus with stage 3a chronic kidney disease, without long-term current use of insulin ([facility])    Lab Results   Component Value Date    HGBA1C 6.5 (H) 11/30/2023   Doing well with diabetic diet. Will continue to monitor. Chronic bronchitis, unspecified chronic bronchitis type ([address])  He denies any shortness of breath or wheezing. Benign essential HTN  Blood pressure is stable with present regimen. Essential thrombocythemia ([address])  Stable. Will continue to monitor. Patient is also on hydroxyurea. CKD (chronic kidney disease) stage 3, GFR 30-59 ml/min [facility])  Lab Results   Component Value Date    EGFR 71 11/30/2023    EGFR 59 (L) 06/12/2023    EGFR 52 (L) 05/23/2023    CREATININE 1.06 11/30/2023    CREATININE 1.24 06/12/2023    CREATININE 1.38 (H) 05/23/2023   Renal function stable. Patient has hyperkalemia. Will discontinue losartan. He is already on Kayexalate. Will add amlodipine 5 mg daily to replace losartan. He will repeat BMP in 1 week. Obesity, morbid ([address])  Continue with low caloric diet and exercise as much as tolerated. Diagnoses and all orders for this visit:    Type 2 diabetes mellitus with stage 3a chronic kidney disease, without long-term current use of insulin ([facility])    Benign essential HTN  -     amLODIPine (NORVASC) 5 mg tablet; Take 1 tablet (5 mg total) by mouth daily    Stage 3a chronic kidney disease ([address])  -     Basic metabolic panel; Future    Essential thrombocythemia ([address])  -     CBC; Future    Obesity, morbid (HCC)    Chronic bronchitis, unspecified chronic bronchitis type ([address])    Hyperkalemia    Preop cardiovascular exam               Subjective:          Patient ID: Bhaskar Weller is a 78 y.o. male. Patient is here for regular follow-up. Also blood work done would like to discuss results.   Otherwise no new complaints        The following portions of the patient's history were reviewed and updated as appropriate: allergies, current medications, past family history, past medical history, past social history, past surgical history, and problem list.    Review of Systems   Constitutional:  Negative for fatigue and fever. HENT:  Negative for congestion, ear discharge, ear pain, postnasal drip, sinus pressure, sore throat, tinnitus and trouble swallowing. Eyes:  Negative for discharge, itching and visual disturbance. Respiratory:  Negative for cough and shortness of breath. Cardiovascular:  Negative for chest pain and palpitations. Gastrointestinal:  Negative for abdominal pain, diarrhea, nausea and vomiting. Endocrine: Negative for cold intolerance and polyuria. Genitourinary:  Negative for difficulty urinating, dysuria and urgency. Musculoskeletal:  Negative for arthralgias and neck pain. Skin:  Negative for rash. Allergic/Immunologic: Negative for environmental allergies. Neurological:  Negative for dizziness, weakness and headaches. Psychiatric/Behavioral:  Negative for agitation and behavioral problems. The patient is not nervous/anxious.           Past Medical History:   Diagnosis Date    Actinic keratosis     RESOLVED: [id number]    Adolescent idiopathic scoliosis of thoracolumbar region 04/12/2018    Allergic     Anxiety     Arthritis     Basal cell carcinoma of back     RESOLVED: [id number]    Basal cell carcinoma of skin of upper extremity, including shoulder     BCC (basal cell carcinoma), trunk     BCE (basal cell epithelioma), trunk     RESOLVED: [id number]    Benign neoplasm of skin of face     Chronic kidney disease     COPD (chronic obstructive pulmonary disease) (HCC)     Coronary artery disease involving native coronary artery of native heart without angina pectoris 10/16/2018    Depression 10/06/2015    Gastroesophageal reflux disease without esophagitis 04/21/2017    GERD (gastroesophageal reflux disease)     Hypertension     Myocardial infarction ([address])     Neoplasm of uncertain behavior of skin Nonmelanoma skin cancer     LAST ASSESSED: [id number]    Pneumonia     Scoliosis     Seborrheic keratosis     RESOLVED: [id number]    Stroke ([address])          Current Outpatient Medications:     albuterol (Ventolin HFA) 90 mcg/act inhaler, Inhale 2 puffs every 6 (six) hours as needed for wheezing, Disp: 18 g, Rfl: 1    allopurinol (ZYLOPRIM) 100 mg tablet, Take 1 tablet (100 mg total) by mouth daily, Disp: 90 tablet, Rfl: 3    amLODIPine (NORVASC) 5 mg tablet, Take 1 tablet (5 mg total) by mouth daily, Disp: 90 tablet, Rfl: 2    carvedilol (COREG) 25 mg tablet, Take 1 tablet (25 mg total) by mouth 2 (two) times a day with meals, Disp: 180 tablet, Rfl: 1    cholecalciferol (VITAMIN D3) 1,000 units tablet, Take 1,000 Units by mouth every other day, Disp: , Rfl:     clopidogrel (PLAVIX) 75 mg tablet, Take 1 tablet (75 mg total) by mouth daily, Disp: 90 tablet, Rfl: 1    hydroxyurea (HYDREA) 500 mg capsule, TAKE ONE CAPSULE BY MOUTH ONE TIME DAILY, Disp: 90 capsule, Rfl: 0    Multiple Vitamin (MULTIVITAMIN ADULT PO), Take by mouth daily, Disp: , Rfl:     rosuvastatin (CRESTOR) 10 MG tablet, Take 1 tablet (10 mg total) by mouth daily at bedtime, Disp: 90 tablet, Rfl: 1    colchicine (COLCRYS) 0.6 mg tablet, Take 1 tablet (0.6 mg total) by mouth daily (Patient not taking: Reported on 12/11/2023), Disp: 30 tablet, Rfl: 1    Cyanocobalamin (VITAMIN B12 PO), Take 1 tablet by mouth in the morning (Patient not taking: Reported on 6/5/2023), Disp: , Rfl:     ergocalciferol (VITAMIN D2) 50,000 units, Take 1 capsule (50,000 Units total) by mouth once a week, Disp: 12 capsule, Rfl: 0    oxyCODONE (ROXICODONE) 5 immediate release tablet, Take 5 mg by mouth every 4 (four) hours as needed (Patient not taking: Reported on 9/13/2023), Disp: , Rfl:     Thiamine HCl (VITAMIN B1 PO), Take 1 tablet by mouth in the morning, Disp: , Rfl:     Allergies   Allergen Reactions    Other      [place] trees       Social History   Past Surgical History: Procedure Laterality Date    ANAL FISTULOTOMY  06/13/2011    DR. SANCHEZ    BASAL CELL CARCINOMA EXCISION  11/2023    back    CORONARY ARTERY BYPASS GRAFT  05/17/2005    TRIPLE;  [address] [facility] [address] SURGERY  10/2009    DR. MARRERO    IR BIOPSY BONE MARROW  07/24/2023    JOINT REPLACEMENT      shoulder replacement    KNEE SURGERY      SHOULDER SURGERY  06/18/2015    JOINT REPLACEMENT; DR. Mojica Thornton SURGERY  12/2020    spine fusion     Family History   Problem Relation Age of Onset    Other Mother         BRAIN TUMOR    No Known Problems Father     Vascular Disease Brother     Cancer Brother     Dementia Brother     Heart disease Brother     COPD Brother     Prostate cancer Brother     Substance Abuse Brother     Drug abuse Brother        Objective:  /76 (BP Location: Left arm, Patient Position: Sitting, Cuff Size: Large)   Pulse 57   Temp 97.8 °F (36.6 °C) (Temporal)   Ht 5' 3" (1.6 m)   Wt 84.4 kg (186 lb)   SpO2 96% Comment: room air  BMI 32.95 kg/m²   Body mass index is 32.95 kg/m². Physical Exam  Constitutional:       General: He is not in acute distress. Appearance: He is well-developed. He is obese. HENT:      Head: Normocephalic. Right Ear: External ear normal.      Left Ear: External ear normal.      Nose: No rhinorrhea. Eyes:      General: No scleral icterus. Pupils: Pupils are equal, round, and reactive to light. Neck:      Thyroid: No thyromegaly. Trachea: No tracheal deviation. Cardiovascular:      Rate and Rhythm: Normal rate and regular rhythm. Heart sounds: Normal heart sounds. Pulmonary:      Effort: Pulmonary effort is normal. No respiratory distress. Breath sounds: Normal breath sounds. Chest:      Chest wall: No tenderness. Abdominal:      General: Bowel sounds are normal.      Palpations: Abdomen is soft. There is no mass. Tenderness: There is no abdominal tenderness.    Musculoskeletal: General: Normal range of motion. Cervical back: Normal range of motion and neck supple. No rigidity. Right lower leg: No edema. Left lower leg: No edema. Lymphadenopathy:      Cervical: No cervical adenopathy. Skin:     General: Skin is warm. Findings: No erythema or rash. Neurological:      Mental Status: He is alert and oriented to person, place, and time. Cranial Nerves: No cranial nerve deficit.    Psychiatric:         Mood and Affect: Mood normal.         Behavior: Behavior normal.

## 2023-12-11 NOTE — ASSESSMENT & PLAN NOTE
Lab Results   Component Value Date    HGBA1C 6.5 (H) 11/30/2023   Doing well with diabetic diet. Will continue to monitor.

## 2023-12-11 NOTE — ASSESSMENT & PLAN NOTE
Lab Results   Component Value Date    EGFR 71 11/30/2023    EGFR 59 (L) 06/12/2023    EGFR 52 (L) 05/23/2023    CREATININE 1.06 11/30/2023    CREATININE 1.24 06/12/2023    CREATININE 1.38 (H) 05/23/2023   Renal function stable. Patient has hyperkalemia. Will discontinue losartan. He is already on Kayexalate. Will add amlodipine 5 mg daily to replace losartan. He will repeat BMP in 1 week. Patient's personal psychiatrist's phone number

## 2023-12-12 ENCOUNTER — TELEPHONE (OUTPATIENT)
Dept: ADMINISTRATIVE | Facility: OTHER | Age: 78
End: 2023-12-12

## 2023-12-12 NOTE — LETTER
Vaccination Request Form: Influenza      Date Requested: 23  Patient: Adarsh Sheppard  Patient : 1945   Referring Provider: Shane Vizcaino MD       The above patient has informed us that they have had their   most recent Influenza administered at your facility. Please   complete this form and attach all corresponding documentation.    Date of Vaccine(s) Given  ______________________________    Lot Number(s) _______________________________________    Manufacture(s) ______________________________________    Dose Amount (s) _____________________________________    Expiration Date(s) ____________________________________    Comments __________________________________________________________  ____________________________________________________________________  ____________________________________________________________________  ____________________________________________________________________    Administering Facility  ________________________________________________    Vaccine Administered By (print name) ___________________________________      Form Completed By (print name) _______________________________________      Signature ___________________________________________________________      These reports are needed for  compliance.    Please fax this completed form and a copy of the Vaccine Document(s) to our office located at [address] [address] as soon as possible to Fax [phone] anna Mcgarry: Phone [phone]    We thank you for your assistance in treating our mutual patient.

## 2023-12-12 NOTE — TELEPHONE ENCOUNTER
----- Message from Michelle Recinos sent at 12/11/2023  3:01 PM EST -----  Regarding: Care Gap Request  12/11/23 3:01 PM    Hello, our patient attached above has had Immunization(s) FLU SHOT  completed/performed. Please assist in updating the patient chart by making an External outreach to [facility]  facility located in [address] [place], [place]. The date of service is 11/2023.    Thank you,  Michelle Recinos [facility]

## 2023-12-12 NOTE — LETTER
Vaccination Request Form: Influenza      Date Requested: 23  Patient: Adarsh Sheppard  Patient : 1945   Referring Provider: Shane Vizcaino MD       The above patient has informed us that they have had their   most recent Influenza administered at your facility. Please   complete this form and attach all corresponding documentation.    Date of Vaccine(s) Given  ______________________________    Lot Number(s) _______________________________________    Manufacture(s) ______________________________________    Dose Amount (s) _____________________________________    Expiration Date(s) ____________________________________    Comments __this yr 2023 ________________________________________________________  ____________________________________________________________________  ____________________________________________________________________  ____________________________________________________________________    Administering Facility  ________________________________________________    Vaccine Administered By (print name) ___________________________________      Form Completed By (print name) _______________________________________      Signature ___________________________________________________________      These reports are needed for  compliance.    Please fax this completed form and a copy of the Vaccine Document(s) to our office located at [address] [facility] [place] [address] as soon as possible to Fax [phone] anna Mcgarry: Phone [phone]    We thank you for your assistance in treating our mutual patient.

## 2023-12-12 NOTE — TELEPHONE ENCOUNTER
Upon review of the In Basket request and the patient's chart, initial outreach has been made via fax to facility. Please see Contacts section for details.     X1 flu    Thank you  Malgorzata Castillo

## 2023-12-19 NOTE — TELEPHONE ENCOUNTER
As a follow-up, a second attempt has been made for outreach via fax to facility. Please see Contacts section for details.    Thank you  Malgorzata Castillo

## 2023-12-20 LAB
ALBUMIN SERPL-MCNC: 4.4 G/DL (ref 3.8–4.8)
ALBUMIN/GLOB SERPL: 2.8 {RATIO} (ref 1.2–2.2)
ALP SERPL-CCNC: 46 IU/L (ref 44–121)
ALT SERPL-CCNC: 13 IU/L (ref 0–44)
AST SERPL-CCNC: 17 IU/L (ref 0–40)
BILIRUB SERPL-MCNC: 0.5 MG/DL (ref 0–1.2)
BUN SERPL-MCNC: 18 MG/DL (ref 8–27)
BUN/CREAT SERPL: 13 (ref 10–24)
CALCIUM SERPL-MCNC: 9.2 MG/DL (ref 8.6–10.2)
CHLORIDE SERPL-SCNC: 104 MMOL/L (ref 96–106)
CO2 SERPL-SCNC: 23 MMOL/L (ref 20–29)
CREAT SERPL-MCNC: 1.38 MG/DL (ref 0.76–1.27)
EGFR: 52 ML/MIN/1.73
GLOBULIN SER-MCNC: 1.6 G/DL (ref 1.5–4.5)
GLUCOSE SERPL-MCNC: 117 MG/DL (ref 70–99)
POTASSIUM SERPL-SCNC: 4.9 MMOL/L (ref 3.5–5.2)
PROT SERPL-MCNC: 6 G/DL (ref 6–8.5)
SODIUM SERPL-SCNC: 140 MMOL/L (ref 134–144)

## 2023-12-20 NOTE — TELEPHONE ENCOUNTER
Upon review of the In Basket request we were able to locate, review, and update the patient chart as requested for Immunization(s) flu.    Any additional questions or concerns should be emailed to the Practice Liaisons via the appropriate education email address, please do not reply via In Basket.    Thank you  Malgorzata Castillo

## 2024-01-31 ENCOUNTER — VBI (OUTPATIENT)
Dept: ADMINISTRATIVE | Facility: OTHER | Age: 79
End: 2024-01-31

## 2024-02-28 NOTE — PROGRESS NOTES
Colon and Rectal Surgery   Adarsh Sheppard 78 y.o. male MRN [id number]  Encounter: [id number]  02/29/24 12:01 PM            Assessment: Adarsh Sheppard is a 78 y.o. male who has anal irritation and swelling.      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  This appears to be very chronic.  Options of observation versus fistulotomy were discussed.  Fistulotomy was chosen.  We will do this at her earliest convenience in the operating room.    Risks, not limited to infection, bleeding, pain, persistent disease, need for future surgery, fecal incontinence, urinary retention, or nonhealing wounds were reviewed at length.  Questions were answered.      Grade I hemorrhoids  There is a right posterior hemorrhoidal nodule.  Examination under anesthesia with biopsy or hemorrhoidectomy is recommended.  I will try to limit the extent of therapy at this site but will include any nodular tissue that may be causing the patient's anal symptoms of irritation and bleeding.    Risks, not limited to infection, bleeding, pain, persistent disease, need for future surgery, fecal incontinence, urinary retention, or nonhealing wounds were reviewed at length.  Questions were answered.        Subjective     HPI    Adarsh Sheppard is a 78 y.o. male who is referred today by Dr. Vizcaino for hemorrhoids. He states he has anal lumps, warm tub soaks help with the swelling. He admits to rectal bleeding, has a daily bowel movement. He notices rectal pressure.     He takes Plavix for history of cerebellar stroke     His most recent colonoscopy was on 10/13/21 with Dr. Mustafa which was within normal limits. No futher colonoscopy recommended.     Historical Information   Past Medical History:   Diagnosis Date    Actinic keratosis     RESOLVED: [id number]    Adolescent idiopathic scoliosis of thoracolumbar region 04/12/2018    Allergic     Anxiety     Arthritis     Basal cell carcinoma of back     RESOLVED: [id number]     Basal cell carcinoma of skin of upper extremity, including shoulder     BCC (basal cell carcinoma), trunk     BCE (basal cell epithelioma), trunk     RESOLVED: [id number]    Benign neoplasm of skin of face     Chronic kidney disease     COPD (chronic obstructive pulmonary disease) (HCC)     Coronary artery disease involving native coronary artery of native heart without angina pectoris 10/16/2018    Depression 10/06/2015    Gastroesophageal reflux disease without esophagitis 04/21/2017    GERD (gastroesophageal reflux disease)     Hypertension     Myocardial infarction (HCC)     Neoplasm of uncertain behavior of skin     Nonmelanoma skin cancer     LAST ASSESSED: [id number]    Pneumonia     Scoliosis     Seborrheic keratosis     RESOLVED: [id number]    Stroke (HCC)      Past Surgical History:   Procedure Laterality Date    ANAL FISTULOTOMY  06/13/2011    DR. SANCHEZ    BASAL CELL CARCINOMA EXCISION  11/2023    back    CORONARY ARTERY BYPASS GRAFT  05/17/2005    TRIPLE; DR. PETTY [facility]     HEMORRHOID SURGERY  10/2009    DR. MARRERO    IR BIOPSY BONE MARROW  07/24/2023    JOINT REPLACEMENT      shoulder replacement    KNEE SURGERY      SHOULDER SURGERY  06/18/2015    JOINT REPLACEMENT; DR. MONTGOMERY    SPINE SURGERY  12/2020    spine fusion       Meds/Allergies       Current Outpatient Medications:     albuterol (Ventolin HFA) 90 mcg/act inhaler, Inhale 2 puffs every 6 (six) hours as needed for wheezing, Disp: 18 g, Rfl: 1    allopurinol (ZYLOPRIM) 100 mg tablet, Take 1 tablet (100 mg total) by mouth daily, Disp: 90 tablet, Rfl: 3    amLODIPine (NORVASC) 5 mg tablet, Take 1 tablet (5 mg total) by mouth daily, Disp: 90 tablet, Rfl: 2    carvedilol (COREG) 25 mg tablet, Take 1 tablet (25 mg total) by mouth 2 (two) times a day with meals, Disp: 180 tablet, Rfl: 1    cholecalciferol (VITAMIN D3) 1,000 units tablet, Take 1,000 Units by mouth every other day, Disp: , Rfl:     clopidogrel (PLAVIX) 75 mg  "tablet, Take 1 tablet (75 mg total) by mouth daily, Disp: 90 tablet, Rfl: 1    colchicine (COLCRYS) 0.6 mg tablet, Take 1 tablet (0.6 mg total) by mouth daily (Patient not taking: Reported on 12/11/2023), Disp: 30 tablet, Rfl: 1    Cyanocobalamin (VITAMIN B12 PO), Take 1 tablet by mouth in the morning (Patient not taking: Reported on 6/5/2023), Disp: , Rfl:     ergocalciferol (VITAMIN D2) 50,000 units, Take 1 capsule (50,000 Units total) by mouth once a week, Disp: 12 capsule, Rfl: 0    hydroxyurea (HYDREA) 500 mg capsule, TAKE ONE CAPSULE BY MOUTH ONE TIME DAILY, Disp: 90 capsule, Rfl: 0    Multiple Vitamin (MULTIVITAMIN ADULT PO), Take by mouth daily, Disp: , Rfl:     oxyCODONE (ROXICODONE) 5 immediate release tablet, Take 5 mg by mouth every 4 (four) hours as needed (Patient not taking: Reported on 9/13/2023), Disp: , Rfl:     rosuvastatin (CRESTOR) 10 MG tablet, Take 1 tablet (10 mg total) by mouth daily at bedtime, Disp: 90 tablet, Rfl: 1    Thiamine HCl (VITAMIN B1 PO), Take 1 tablet by mouth in the morning, Disp: , Rfl:   Allergies   Allergen Reactions    Other      [place] trees       Social History   Social History     Substance and Sexual Activity   Drug Use No     Social History     Tobacco Use   Smoking Status Former    Types: Cigars   Smokeless Tobacco Never   Tobacco Comments    one cigar once in awhile         Family History   Problem Relation Age of Onset    Other Mother         BRAIN TUMOR    No Known Problems Father     Vascular Disease Brother     Cancer Brother     Dementia Brother     Heart disease Brother     COPD Brother     Prostate cancer Brother     Substance Abuse Brother     Drug abuse Brother          Review of Systems   Constitutional: Negative.    Respiratory: Negative.     Cardiovascular: Negative.    Gastrointestinal:  Positive for anal bleeding and rectal pain.       Objective   Current Vitals:  Vitals:    02/29/24 1119   Weight: 83.9 kg (185 lb)   Height: 5' 3\" (1.6 m)         Physical " Exam  Constitutional:       Appearance: Normal appearance.   Cardiovascular:      Rate and Rhythm: Normal rate and regular rhythm.   Pulmonary:      Effort: Pulmonary effort is normal.      Breath sounds: Normal breath sounds.   Genitourinary:     Comments: The prostate is unremarkable.  There is a right posterior hemorrhoidal nodule.  Anoscopy reveals this to have some inflammatory changes and scarring.  The hemorrhoids are otherwise mostly normal.    At the left posterior position, a superficial fistula was probed through and through to the distal anus.  This seems to be involving only the skin rather than muscular tissue.  Fistulotomy may benefit this area.  It appears to be very chronic.  Neurological:      Mental Status: He is alert.     Lower Endoscopy    Date/Time: 2/29/2024 11:20 AM    Performed by: JOSE ALFREDO Ann MD  Authorized by: JOSE ALFREDO Ann MD    Verbal consent obtained?: Yes    Consent given by:  Patient  Scope type:  Anoscope   The prostate is unremarkable.  There is a right posterior hemorrhoidal nodule.  Anoscopy reveals this to have some inflammatory changes and scarring.  The hemorrhoids are otherwise mostly normal.    At the left posterior position, a superficial fistula was probed through and through to the distal anus.  This seems to be involving only the skin rather than muscular tissue.  Fistulotomy may benefit this area.  It appears to be very chronic.

## 2024-02-29 ENCOUNTER — OFFICE VISIT (OUTPATIENT)
Age: 79
End: 2024-02-29
Payer: COMMERCIAL

## 2024-02-29 ENCOUNTER — TELEPHONE (OUTPATIENT)
Age: 79
End: 2024-02-29

## 2024-02-29 VITALS — BODY MASS INDEX: 32.78 KG/M2 | WEIGHT: 185 LBS | HEIGHT: 63 IN

## 2024-02-29 DIAGNOSIS — K64.0 GRADE I HEMORRHOIDS: ICD-10-CM

## 2024-02-29 DIAGNOSIS — K60.3 ANAL FISTULA: Primary | ICD-10-CM

## 2024-02-29 PROBLEM — K60.30 ANAL FISTULA: Status: ACTIVE | Noted: 2024-02-29

## 2024-02-29 PROCEDURE — 99203 OFFICE O/P NEW LOW 30 MIN: CPT | Performed by: COLON & RECTAL SURGERY

## 2024-02-29 PROCEDURE — 46600 DIAGNOSTIC ANOSCOPY SPX: CPT | Performed by: COLON & RECTAL SURGERY

## 2024-02-29 NOTE — ASSESSMENT & PLAN NOTE
There is a right posterior hemorrhoidal nodule.  Examination under anesthesia with biopsy or hemorrhoidectomy is recommended.  I will try to limit the extent of therapy at this site but will include any nodular tissue that may be causing the patient's anal symptoms of irritation and bleeding.    Risks, not limited to infection, bleeding, pain, persistent disease, need for future surgery, fecal incontinence, urinary retention, or nonhealing wounds were reviewed at length.  Questions were answered.

## 2024-02-29 NOTE — ASSESSMENT & PLAN NOTE
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  This appears to be very chronic.  Options of observation versus fistulotomy were discussed.  Fistulotomy was chosen.  We will do this at her earliest convenience in the operating room.    Risks, not limited to infection, bleeding, pain, persistent disease, need for future surgery, fecal incontinence, urinary retention, or nonhealing wounds were reviewed at length.  Questions were answered.

## 2024-02-29 NOTE — LETTER
Adarsh Sheppard  [address]   [address]  [facility] [address]        2/29/2024    Dear Adarsh Sheppard,    Your surgery is scheduled for: June 7, 2024  The hospital will call the evening prior to your surgery with your expected arrival time.   Location:[facility] [facility] [address]    CHECK LIST PRIOR TO OUTPATIENT SURGERY  It is your responsibility to obtain any/all referrals needed for your surgery if required by your insurance. Our office will contact you to discuss your insurance coverage for this procedure.     Special instructions required if you are taking any blood thinners. Please verify with the prescribing physician. Examples include Coumadin, Plavix, Xarelto, Eliquis, Pradaxa, etc.     Please check with your family physician if you are taking the following medications: Aspirin or any Aspirin containing medication, Gingko biloba, Ginseng, Feverfew, and/or [place]’s Wort. We suggest stopping these for 3 days.     The night before and the day of your surgery, wash from your neck to groin with chlorhexidine soap. This soap is available at most retail pharmacies under such brand names as Hibiclens, Endure or Aplicare.     Pre-admission testing Required: YES      NO x     NOTHING TO EAT OR DRINK AFTER MIDNIGHT PRIOR TO SURGERY.     Take ONE FLEET ENEMA one hour prior to leaving for the hospital.     Please do not hesitate to call our office with any questions regarding your surgery.                          Post-Operative Care Information   Hemorrhoidectomy, EUA, Fissurectomy, Fistulotomy, Sphincterotomy      Resume high fiber diet. Fiber supplementation with Metamucil or Konsyl also recommended daily.       Your first bowel movement may take up to 3 days after surgery. THIS IS OFTEN PAINFUL.      While taking narcotic pain medication, you should remain on an over-the-counter stool softener such as Colace (one tablet twice daily). For constipation Miralax  can be taken up to three times daily.     Pain is to be expected after hemorrhoid surgery. Ibuprofen (400? 600MG) every 6?8 hours is an excellent alternative in addition or as a substitute to your narcotic pain medication.     Rectal bleeding or drainage is normal after surgery.       Nausea is a common complaint post op. This can be associated with the narcotic pain medications, anesthesia as well as with severe constipation.     Driving may be resumed when all off all narcotic pain medications and you can turn or twist your body without hesitation.    If you are unable to urinate within 8 hours after surgery, please call the office at [phone]    Frequent warm tub soaks or warm showers are often soothing, we suggest 3 to 4 times daily.    After bowel movements, you may wash with a hand shower or warm tub soak.  No soap is okay.     No strenuous activity (i.e., Running, jogging, swimming, or weightlifting) for up to one week after surgery.     When will I receive follow-up care?      You should be scheduled for a post-op appointment within 6-8 weeks after surgery, unless otherwise instructed on your discharge summary. If you do not have an existing appointment at the time of discharge, please call our office at [phone].    Call the office at [phone] immediately if you have a fever, chills, excessive sweating, difficulty urinating, or excessive/profuse bleeding with clots.

## 2024-03-05 DIAGNOSIS — I63.9 CEREBELLAR STROKE (HCC): ICD-10-CM

## 2024-03-05 RX ORDER — CLOPIDOGREL BISULFATE 75 MG/1
75 TABLET ORAL DAILY
Qty: 90 TABLET | Refills: 1 | Status: SHIPPED | OUTPATIENT
Start: 2024-03-05

## 2024-03-25 ENCOUNTER — TELEPHONE (OUTPATIENT)
Dept: INTERNAL MEDICINE CLINIC | Facility: OTHER | Age: 79
End: 2024-03-25

## 2024-03-25 ENCOUNTER — TRANSITIONAL CARE MANAGEMENT (OUTPATIENT)
Dept: INTERNAL MEDICINE CLINIC | Facility: OTHER | Age: 79
End: 2024-03-25

## 2024-03-26 ENCOUNTER — OFFICE VISIT (OUTPATIENT)
Dept: INTERNAL MEDICINE CLINIC | Facility: OTHER | Age: 79
End: 2024-03-26
Payer: COMMERCIAL

## 2024-03-26 VITALS
RESPIRATION RATE: 20 BRPM | SYSTOLIC BLOOD PRESSURE: 110 MMHG | HEIGHT: 63 IN | OXYGEN SATURATION: 94 % | HEART RATE: 66 BPM | WEIGHT: 187.4 LBS | DIASTOLIC BLOOD PRESSURE: 62 MMHG | BODY MASS INDEX: 33.2 KG/M2 | TEMPERATURE: 98.4 F

## 2024-03-26 DIAGNOSIS — I10 BENIGN ESSENTIAL HTN: Primary | ICD-10-CM

## 2024-03-26 DIAGNOSIS — N18.31 TYPE 2 DIABETES MELLITUS WITH STAGE 3A CHRONIC KIDNEY DISEASE, WITHOUT LONG-TERM CURRENT USE OF INSULIN (HCC): ICD-10-CM

## 2024-03-26 DIAGNOSIS — D47.1 CHRONIC MYELOPROLIFERATIVE DISEASE (HCC): ICD-10-CM

## 2024-03-26 DIAGNOSIS — D47.3 ESSENTIAL THROMBOCYTHEMIA (HCC): ICD-10-CM

## 2024-03-26 DIAGNOSIS — E66.01 OBESITY, MORBID (HCC): ICD-10-CM

## 2024-03-26 DIAGNOSIS — I73.9 CLAUDICATION (HCC): ICD-10-CM

## 2024-03-26 DIAGNOSIS — G45.9 TRANSIENT ISCHEMIC ATTACK: ICD-10-CM

## 2024-03-26 DIAGNOSIS — E11.22 TYPE 2 DIABETES MELLITUS WITH STAGE 3A CHRONIC KIDNEY DISEASE, WITHOUT LONG-TERM CURRENT USE OF INSULIN (HCC): ICD-10-CM

## 2024-03-26 DIAGNOSIS — J42 CHRONIC BRONCHITIS, UNSPECIFIED CHRONIC BRONCHITIS TYPE (HCC): ICD-10-CM

## 2024-03-26 DIAGNOSIS — E78.00 HYPERCHOLESTEROLEMIA: ICD-10-CM

## 2024-03-26 DIAGNOSIS — N18.31 STAGE 3A CHRONIC KIDNEY DISEASE (HCC): ICD-10-CM

## 2024-03-26 PROCEDURE — 99496 TRANSJ CARE MGMT HIGH F2F 7D: CPT | Performed by: INTERNAL MEDICINE

## 2024-03-26 RX ORDER — ANAGRELIDE 0.5 MG/1
0.5 CAPSULE ORAL 2 TIMES DAILY
COMMUNITY
Start: 2024-03-23 | End: 2025-03-23

## 2024-03-26 NOTE — PROGRESS NOTES
Assessment/Plan:    Benign essential HTN  Blood pressure is stable on present regimen.  Continue with low-salt diet    Chronic bronchitis, unspecified chronic bronchitis type (HCC)  Continue with present regimen.    Type 2 diabetes mellitus with stage 3a chronic kidney disease, without long-term current use of insulin ([facility])    Lab Results   Component Value Date    HGBA1C 6.3 (H) 03/22/2024   Well-controlled on diabetic diet.  Will continue to monitor    CKD (chronic kidney disease) stage 3, GFR 30-59 ml/min ([facility])  Lab Results   Component Value Date    EGFR 56 (L) 03/23/2024    EGFR 54 (L) 03/22/2024    EGFR 44 (L) 03/21/2024    CREATININE 1.29 03/23/2024    CREATININE 1.32 (H) 03/22/2024    CREATININE 1.57 (H) 03/21/2024   Renal function is stable.  Will continue to monitor.  Advised for adequate oral hydration and to avoid nephrotoxic meds including OTC NSAIDs    Chronic myeloproliferative disease (HCC)  Managed by hematologist    Essential thrombocythemia (HCC)  Patient was started on Agrylin 0.5 mg capsule daily by his hematologist.  Will continue to monitor platelet count    Claudication ([facility])  Resolved.  No symptoms.  Continue with present regimen    Obesity, morbid ([facility])  Advised for low caloric diet.    Transient ischemic attack  Patient was evaluated by neurologist in the hospital and was started on aspirin and Plavix.  Will need follow-up with them in the next couple of weeks.  Also dose of Crestor was increased from 10 to 20 mg daily.       Diagnoses and all orders for this visit:    Benign essential HTN    Hypercholesterolemia  -     Lipid Panel with Direct LDL reflex; Future    Chronic bronchitis, unspecified chronic bronchitis type (HCC)    Type 2 diabetes mellitus with stage 3a chronic kidney disease, without long-term current use of insulin (HCC)    Stage 3a chronic kidney disease (HCC)  -     Comprehensive metabolic panel; Future    Essential thrombocythemia (HCC)  -     CBC; Future    Obesity,  morbid (HCC)    Transient ischemic attack    Chronic myeloproliferative disease (HCC)    Claudication (HCC)    Other orders  -     anagrelide (AGRYLIN) 0.5 MG capsule; Take 0.5 mg by mouth 2 (two) times a day          Subjective:          Patient ID: Adarsh Sheppard is a 78 y.o. male.    TCM Call       Date and time call was made  3/25/2024 12:42 PM    Hospital care reviewed  Records reviewed    Patient was hospitialized at  [facility]    Date of Admission  03/21/24    Date of discharge  03/23/24    Diagnosis  TIA, acute CVA    Disposition  Home    Were the patients medications reviewed and updated  Yes    Current Symptoms  None          TCM Call       Post hospital issues  None    Should patient be enrolled in anticoag monitoring?  Yes    Scheduled for follow up?  Yes    Did you obtain your prescribed medications  Yes    Do you need help managing your prescriptions or medications  No    Is transportation to your appointment needed  No    I have advised the patient to call PCP with any new or worsening symptoms  Alon Anne CMA    Living Arrangements  Spouse or Significiant other    Support System  Spouse    The type of support provided  Emotional; Physical    Do you have social support  Yes, quite a bit    Are you recieving any outpatient services  No    Are you recieving home care services  No    Are you using any community resources  No    Current waiver services  No    Have you fallen in the last 12 months  No    Interperter language line needed  No    Counseling  Patient    Counseling topics  Activities of daily living; Importance of RX compliance; instructions for management            This is a follow-up after hospitalization.  Patient was [facility] at [facility] with TIA.  Symptoms started right-sided weakness and slight slurred speech.  Symptoms lasted for few minutes.  Was seen by neurology during hospitalization.  Plavix along with aspirin was added and dose of  Crestor was increased from 10 mg to 20 mg.  No recurrence of symptoms.        The following portions of the patient's history were reviewed and updated as appropriate: allergies, current medications, past family history, past medical history, past social history, past surgical history, and problem list.    Review of Systems   Constitutional:  Negative for fatigue and fever.   HENT:  Negative for congestion, ear discharge, ear pain, postnasal drip, sinus pressure, sore throat, tinnitus and trouble swallowing.    Eyes:  Negative for discharge, itching and visual disturbance.   Respiratory:  Negative for cough and shortness of breath.    Cardiovascular:  Negative for chest pain and palpitations.   Gastrointestinal:  Negative for abdominal pain, diarrhea, nausea and vomiting.   Endocrine: Negative for cold intolerance and polyuria.   Genitourinary:  Negative for difficulty urinating, dysuria and urgency.   Musculoskeletal:  Negative for arthralgias and neck pain.   Skin:  Negative for rash.   Allergic/Immunologic: Negative for environmental allergies.   Neurological:  Negative for dizziness, weakness and headaches.   Psychiatric/Behavioral:  Negative for agitation and behavioral problems. The patient is not nervous/anxious.          Past Medical History:   Diagnosis Date    Actinic keratosis     RESOLVED: [id number]    Adolescent idiopathic scoliosis of thoracolumbar region 04/12/2018    Allergic     Anxiety     Arthritis     Basal cell carcinoma of back     RESOLVED: [id number]    Basal cell carcinoma of skin of upper extremity, including shoulder     BCC (basal cell carcinoma), trunk     BCE (basal cell epithelioma), trunk     RESOLVED: [id number]    Benign neoplasm of skin of face     Chronic kidney disease     COPD (chronic obstructive pulmonary disease) (HCC)     Coronary artery disease involving native coronary artery of native heart without angina pectoris 10/16/2018    Depression 10/06/2015    Gastroesophageal  reflux disease without esophagitis 04/21/2017    GERD (gastroesophageal reflux disease)     Hypertension     Myocardial infarction (HCC)     Neoplasm of uncertain behavior of skin     Nonmelanoma skin cancer     LAST ASSESSED: [id number]    Pneumonia     Scoliosis     Seborrheic keratosis     RESOLVED: [id number]    Stroke (HCC)          Current Outpatient Medications:     allopurinol (ZYLOPRIM) 100 mg tablet, Take 1 tablet (100 mg total) by mouth daily, Disp: 90 tablet, Rfl: 3    amLODIPine (NORVASC) 5 mg tablet, Take 1 tablet (5 mg total) by mouth daily, Disp: 90 tablet, Rfl: 2    anagrelide (AGRYLIN) 0.5 MG capsule, Take 0.5 mg by mouth 2 (two) times a day, Disp: , Rfl:     carvedilol (COREG) 25 mg tablet, Take 1 tablet (25 mg total) by mouth 2 (two) times a day with meals, Disp: 180 tablet, Rfl: 1    cholecalciferol (VITAMIN D3) 1,000 units tablet, Take 2,000 Units by mouth 2 (two) times a week, Disp: , Rfl:     clopidogrel (PLAVIX) 75 mg tablet, Take 1 tablet (75 mg total) by mouth daily, Disp: 90 tablet, Rfl: 1    rosuvastatin (CRESTOR) 10 MG tablet, Take 1 tablet (10 mg total) by mouth daily at bedtime (Patient taking differently: Take 10 mg by mouth 2 (two) times a day), Disp: 90 tablet, Rfl: 1    albuterol (Ventolin HFA) 90 mcg/act inhaler, Inhale 2 puffs every 6 (six) hours as needed for wheezing, Disp: 18 g, Rfl: 1    Multiple Vitamin (MULTIVITAMIN ADULT PO), Take by mouth daily (Patient not taking: Reported on 3/26/2024), Disp: , Rfl:     oxyCODONE (ROXICODONE) 5 immediate release tablet, Take 5 mg by mouth every 4 (four) hours as needed (Patient not taking: Reported on 9/13/2023), Disp: , Rfl:     Thiamine HCl (VITAMIN B1 PO), Take 1 tablet by mouth in the morning, Disp: , Rfl:     Allergies   Allergen Reactions    Other      Camby trees    Hydroxyurea Rash       Social History   Past Surgical History:   Procedure Laterality Date    ANAL FISTULOTOMY  06/13/2011    DR. SANCHEZ    BASAL CELL CARCINOMA  "EXCISION  11/2023    back    CORONARY ARTERY BYPASS GRAFT  05/17/2005    TRIPLE; DR. PETTY SURGEON [facility]     HEMORRHOID SURGERY  10/2009    DR. MARRERO    IR BIOPSY BONE MARROW  07/24/2023    JOINT REPLACEMENT      shoulder replacement    KNEE SURGERY      SHOULDER SURGERY  06/18/2015    JOINT REPLACEMENT; DR. MONTGOMERY    SPINE SURGERY  12/2020    spine fusion     Family History   Problem Relation Age of Onset    Other Mother         BRAIN TUMOR    No Known Problems Father     Vascular Disease Brother     Cancer Brother     Dementia Brother     Heart disease Brother     COPD Brother     Prostate cancer Brother     Substance Abuse Brother     Drug abuse Brother        Objective:  /62 (BP Location: Left arm, Patient Position: Sitting, Cuff Size: Large)   Pulse 66   Temp 98.4 °F (36.9 °C) (Temporal)   Resp 20   Ht 5' 3\" (1.6 m)   Wt 85 kg (187 lb 6.4 oz)   SpO2 94%   BMI 33.20 kg/m²   Body mass index is 33.2 kg/m².     Physical Exam  Constitutional:       General: He is not in acute distress.     Appearance: He is well-developed. He is not ill-appearing or diaphoretic.   HENT:      Head: Normocephalic.      Right Ear: Ear canal and external ear normal. There is no impacted cerumen.      Left Ear: Ear canal and external ear normal. There is no impacted cerumen.      Nose: No congestion or rhinorrhea.      Mouth/Throat:      Pharynx: No oropharyngeal exudate or posterior oropharyngeal erythema.   Eyes:      General: No scleral icterus.     Pupils: Pupils are equal, round, and reactive to light.   Neck:      Thyroid: No thyromegaly.      Trachea: No tracheal deviation.   Cardiovascular:      Rate and Rhythm: Normal rate and regular rhythm.      Heart sounds: Normal heart sounds. No murmur heard.  Pulmonary:      Effort: Pulmonary effort is normal. No respiratory distress.      Breath sounds: Normal breath sounds.   Chest:      Chest wall: No tenderness.   Abdominal:      General: Bowel sounds " are normal.      Palpations: Abdomen is soft. There is no mass.      Tenderness: There is no abdominal tenderness.   Musculoskeletal:         General: Normal range of motion.      Cervical back: Normal range of motion and neck supple. No rigidity.      Right lower leg: No edema.      Left lower leg: No edema.   Lymphadenopathy:      Cervical: No cervical adenopathy.   Skin:     General: Skin is warm.      Findings: No erythema, lesion or rash.   Neurological:      Mental Status: He is alert and oriented to person, place, and time.      Cranial Nerves: No cranial nerve deficit.      Sensory: No sensory deficit.      Motor: No weakness.      Coordination: Coordination normal.      Gait: Gait normal.      Deep Tendon Reflexes: Reflexes normal.   Psychiatric:         Mood and Affect: Mood normal.         Behavior: Behavior normal.

## 2024-03-26 NOTE — ASSESSMENT & PLAN NOTE
Lab Results   Component Value Date    HGBA1C 6.3 (H) 03/22/2024   Well-controlled on diabetic diet.  Will continue to monitor

## 2024-03-26 NOTE — ASSESSMENT & PLAN NOTE
Patient was evaluated by neurologist in the hospital and was started on aspirin and Plavix.  Will need follow-up with them in the next couple of weeks.  Also dose of Crestor was increased from 10 to 20 mg daily.

## 2024-03-26 NOTE — ASSESSMENT & PLAN NOTE
Lab Results   Component Value Date    EGFR 56 (L) 03/23/2024    EGFR 54 (L) 03/22/2024    EGFR 44 (L) 03/21/2024    CREATININE 1.29 03/23/2024    CREATININE 1.32 (H) 03/22/2024    CREATININE 1.57 (H) 03/21/2024   Renal function is stable.  Will continue to monitor.  Advised for adequate oral hydration and to avoid nephrotoxic meds including OTC NSAIDs

## 2024-03-26 NOTE — ASSESSMENT & PLAN NOTE
Patient was started on Agrylin 0.5 mg capsule daily by his hematologist.  Will continue to monitor platelet count

## 2024-04-17 DIAGNOSIS — M10.09 ACUTE IDIOPATHIC GOUT OF MULTIPLE SITES: ICD-10-CM

## 2024-04-17 RX ORDER — ALLOPURINOL 100 MG/1
100 TABLET ORAL DAILY
Qty: 90 TABLET | Refills: 1 | Status: SHIPPED | OUTPATIENT
Start: 2024-04-17

## 2024-04-19 ENCOUNTER — TELEPHONE (OUTPATIENT)
Dept: INTERNAL MEDICINE CLINIC | Facility: OTHER | Age: 79
End: 2024-04-19

## 2024-04-19 DIAGNOSIS — M10.9 ACUTE GOUTY ARTHRITIS: ICD-10-CM

## 2024-04-19 RX ORDER — COLCHICINE 0.6 MG/1
0.6 TABLET ORAL DAILY
Qty: 30 TABLET | Refills: 1 | Status: SHIPPED | OUTPATIENT
Start: 2024-04-19

## 2024-05-09 ENCOUNTER — OFFICE VISIT (OUTPATIENT)
Dept: CARDIOLOGY CLINIC | Facility: CLINIC | Age: 79
End: 2024-05-09
Payer: COMMERCIAL

## 2024-05-09 VITALS
HEART RATE: 55 BPM | SYSTOLIC BLOOD PRESSURE: 122 MMHG | WEIGHT: 182 LBS | BODY MASS INDEX: 32.25 KG/M2 | DIASTOLIC BLOOD PRESSURE: 62 MMHG | OXYGEN SATURATION: 95 % | HEIGHT: 63 IN

## 2024-05-09 DIAGNOSIS — D47.3 ESSENTIAL THROMBOCYTHEMIA (HCC): ICD-10-CM

## 2024-05-09 DIAGNOSIS — Z01.810 PREOP CARDIOVASCULAR EXAM: Primary | ICD-10-CM

## 2024-05-09 DIAGNOSIS — I65.23 CAROTID ARTERY STENOSIS, ASYMPTOMATIC, BILATERAL: ICD-10-CM

## 2024-05-09 DIAGNOSIS — G45.9 TRANSIENT ISCHEMIC ATTACK: ICD-10-CM

## 2024-05-09 DIAGNOSIS — I25.10 CORONARY ARTERY DISEASE INVOLVING NATIVE CORONARY ARTERY OF NATIVE HEART WITHOUT ANGINA PECTORIS: ICD-10-CM

## 2024-05-09 DIAGNOSIS — E78.00 HYPERCHOLESTEROLEMIA: ICD-10-CM

## 2024-05-09 DIAGNOSIS — I10 BENIGN ESSENTIAL HTN: ICD-10-CM

## 2024-05-09 DIAGNOSIS — I73.9 CLAUDICATION (HCC): ICD-10-CM

## 2024-05-09 PROCEDURE — 99214 OFFICE O/P EST MOD 30 MIN: CPT | Performed by: INTERNAL MEDICINE

## 2024-05-09 NOTE — PROGRESS NOTES
Cardiology Follow Up    Adarsh Sheppard  1945  [id number]  [facility] [place]  [address]  [address]  [place] [id number] [phone]    1. Preop cardiovascular exam        2. Benign essential HTN        3. Carotid artery stenosis, asymptomatic, bilateral        4. Coronary artery disease involving native coronary artery of native heart without angina pectoris        5. Transient ischemic attack  AMB extended holter monitor      6. Essential thrombocythemia (HCC)        7. Claudication (HCC)        8. Hypercholesterolemia            Discussion/Summary: He had a recent small CVA at an outside hospital.  Unclear etiology.  MRI suggested carotid disease but Doppler showed this was much less significant.  He was started on anagrelide for thrombocytopenia.  At this point recommend 2-week ambulatory monitor to screen for any atrial fibrillation.  Blood pressure is well-controlled he is now taking his statin on a regular basis.  Will follow-up with him in 6 months.  He has a neurology appointment coming up on the 20th of  Testing I will review with him.  Echocardiogram showed no structural change.      Interval History:   78-year-old gentle with a history of coronary artery disease status post CABG 10 years ago presents for a routine scheduled follow-up visit.  He also has a history of hypertension, hyperlipidemia, mild carotid plaque.  He has been rather sedentary since her last visit.      He suffers from chronic back pain he presents today for preoperative risk assessment for spine surgery.  Coronary disease stable with no complaints of angina.  Functional capacity is quite good.     Overall has been doing well denies any chest pain, shortness of breath, palpitations, lightheadedness, dizziness, or syncope.  Denies lower extreme edema, PND, orthopnea.  Unfortunately he had a small stroke at [facility] and was taking care  of.  Follow-up with neurology coming up.  No clear etiology platelets are quite high.          Problem List       Myalgia    Polyarthralgia    Fatigue    Low vitamin D level    Lumbar pain    Adolescent idiopathic scoliosis of thoracolumbar region    Abnormal glucose    Benign essential HTN    BPH (benign prostatic hyperplasia)    Depression    Gastroesophageal reflux disease without esophagitis    Hypercholesterolemia    Scoliosis    Screening for malignant neoplasm of prostate    Coronary artery disease involving native coronary artery of native heart without angina pectoris          Past Medical History:   Diagnosis Date    Actinic keratosis     RESOLVED: [id number]    Adolescent idiopathic scoliosis of thoracolumbar region 04/12/2018    Allergic     Anxiety     Arthritis     Basal cell carcinoma of back     RESOLVED: [id number]    Basal cell carcinoma of skin of upper extremity, including shoulder     BCC (basal cell carcinoma), trunk     BCE (basal cell epithelioma), trunk     RESOLVED: [id number]    Benign neoplasm of skin of face     Chronic kidney disease     COPD (chronic obstructive pulmonary disease) (HCC)     Coronary artery disease involving native coronary artery of native heart without angina pectoris 10/16/2018    Depression 10/06/2015    Gastroesophageal reflux disease without esophagitis 04/21/2017    GERD (gastroesophageal reflux disease)     Hypertension     Myocardial infarction (HCC)     Neoplasm of uncertain behavior of skin     Nonmelanoma skin cancer     LAST ASSESSED: [id number]    Pneumonia     Scoliosis     Seborrheic keratosis     RESOLVED: [id number]    Stroke (HCC)      Social History     Socioeconomic History    Marital status: /Civil Union     Spouse name: Not on file    Number of children: Not on file    Years of education: Not on file    Highest education level: Not on file   Occupational History    Not on file   Tobacco Use    Smoking status: Former     Types: Cigars     Smokeless tobacco: Never    Tobacco comments:     one cigar once in awhile   Vaping Use    Vaping status: Never Used   Substance and Sexual Activity    Alcohol use: Yes     Alcohol/week: 1.0 standard drink of alcohol     Types: 1 Glasses of wine per week     Comment: occasionally    Drug use: No    Sexual activity: Not Currently     Partners: Female     Birth control/protection: Male Sterilization   Other Topics Concern    Not on file   Social History Narrative    Not on file     Social Determinants of Health     Financial Resource Strain: Low Risk  (3/22/2024)    Received from [facility]    Overall Financial Resource Strain (CARDIA)     Difficulty of Paying Living Expenses: Not hard at all   Food Insecurity: No Food Insecurity (3/22/2024)    Received from [facility]    Hunger Vital Sign     Worried About Running Out of Food in the Last Year: Never true     Ran Out of Food in the Last Year: Never true   Transportation Needs: No Transportation Needs (3/22/2024)    Received from [facility]    PRAPARE - Transportation     Lack of Transportation (Medical): No     Lack of Transportation (Non-Medical): No   Physical Activity: Not on file   Stress: Not on file   Social Connections: Not on file   Intimate Partner Violence: Not At Risk (3/22/2024)    Received from [facility]    Humiliation, Afraid, Rape, and Kick questionnaire     Fear of Current or Ex-Partner: No     Emotionally Abused: No     Physically Abused: No     Sexually Abused: No   Housing Stability: Low Risk  (3/22/2024)    Received from [facility]    Housing Stability Vital Sign     Unable to Pay for Housing in the Last Year: No     Number of Places Lived in the Last Year: 1     Unstable Housing in the Last Year: No      Family History   Problem Relation Age of Onset    Other Mother         BRAIN TUMOR    No Known Problems Father     Vascular Disease Brother     Cancer  Brother     Dementia Brother     Heart disease Brother     COPD Brother     Prostate cancer Brother     Substance Abuse Brother     Drug abuse Brother      Past Surgical History:   Procedure Laterality Date    ANAL FISTULOTOMY  06/13/2011    DR. SANCHEZ    BASAL CELL CARCINOMA EXCISION  11/2023    back    CORONARY ARTERY BYPASS GRAFT  05/17/2005    TRIPLE; DR. PETTY SURGEON [facility]     HEMORRHOID SURGERY  10/2009    DR. MARRERO    IR BIOPSY BONE MARROW  07/24/2023    JOINT REPLACEMENT      shoulder replacement    KNEE SURGERY      SHOULDER SURGERY  06/18/2015    JOINT REPLACEMENT; DR. MONTGOMERY    SPINE SURGERY  12/2020    spine fusion       Current Outpatient Medications:     amLODIPine (NORVASC) 5 mg tablet, Take 1 tablet (5 mg total) by mouth daily, Disp: 90 tablet, Rfl: 2    anagrelide (AGRYLIN) 0.5 MG capsule, Take 0.5 mg by mouth 2 (two) times a day, Disp: , Rfl:     carvedilol (COREG) 25 mg tablet, Take 1 tablet (25 mg total) by mouth 2 (two) times a day with meals, Disp: 180 tablet, Rfl: 1    cholecalciferol (VITAMIN D3) 1,000 units tablet, Take 2,000 Units by mouth 2 (two) times a week, Disp: , Rfl:     rosuvastatin (CRESTOR) 10 MG tablet, Take 1 tablet (10 mg total) by mouth daily at bedtime (Patient taking differently: Take 10 mg by mouth daily), Disp: 90 tablet, Rfl: 1    Thiamine HCl (VITAMIN B1 PO), Take 1 tablet by mouth in the morning, Disp: , Rfl:     albuterol (Ventolin HFA) 90 mcg/act inhaler, Inhale 2 puffs every 6 (six) hours as needed for wheezing, Disp: 18 g, Rfl: 1    allopurinol (ZYLOPRIM) 100 mg tablet, Take 1 tablet (100 mg total) by mouth daily (Patient not taking: Reported on 5/9/2024), Disp: 90 tablet, Rfl: 1    clopidogrel (PLAVIX) 75 mg tablet, Take 1 tablet (75 mg total) by mouth daily (Patient not taking: Reported on 5/9/2024), Disp: 90 tablet, Rfl: 1    colchicine (COLCRYS) 0.6 mg tablet, Take 1 tablet (0.6 mg total) by mouth daily (Patient not taking: Reported on  "5/9/2024), Disp: 30 tablet, Rfl: 1    Multiple Vitamin (MULTIVITAMIN ADULT PO), Take by mouth daily (Patient not taking: Reported on 3/26/2024), Disp: , Rfl:     oxyCODONE (ROXICODONE) 5 immediate release tablet, Take 5 mg by mouth every 4 (four) hours as needed (Patient not taking: Reported on 9/13/2023), Disp: , Rfl:   Allergies   Allergen Reactions    Other      Fowler trees    Hydroxyurea Rash       Labs:     Chemistry        Component Value Date/Time     07/21/2015 0954    K 4.0 03/23/2024 0348     03/23/2024 0348    CO2 24 03/23/2024 0348    BUN 21 03/23/2024 0348    CREATININE 1.29 03/23/2024 0348        Component Value Date/Time    CALCIUM 9.0 03/23/2024 0348    ALKPHOS 39 03/22/2024 0229    AST 13 03/22/2024 0229    ALT 8 03/22/2024 0229    BILITOT 0.53 07/21/2015 0954            Lab Results   Component Value Date    CHOL 142 12/16/2015    CHOL 231 07/21/2015     Lab Results   Component Value Date    HDL 41 11/30/2023    HDL 39 (L) 05/01/2023    HDL 48 06/23/2022     Lab Results   Component Value Date    LDLCALC 115 (H) 11/30/2023    LDLCALC 88 05/01/2023    LDLCALC 44 06/23/2022     Lab Results   Component Value Date    TRIG 168 (H) 11/30/2023    TRIG 178 (H) 05/01/2023    TRIG 110 06/23/2022     No results found for: \"CHOLHDL\"    Imaging: No results found.    ECG:  Sinus bradycardia inferior infarct age indeterminate      Review of Systems   Constitutional: Negative.   HENT: Negative.     Eyes: Negative.    Cardiovascular: Negative.    Respiratory: Negative.     Endocrine: Negative.    Hematologic/Lymphatic: Negative.    Skin: Negative.    Musculoskeletal: Negative.    Gastrointestinal: Negative.    Genitourinary: Negative.    Neurological: Negative.    Psychiatric/Behavioral: Negative.     All other systems reviewed and are negative.      Vitals:    05/09/24 1559   BP: 122/62   Pulse: 55   SpO2: 95%     Vitals:    05/09/24 1559   Weight: 82.6 kg (182 lb)     Height: 5' 3\" (160 cm)   Body mass " index is 32.24 kg/m².  Physical Exam:  Vital signs reviewed  General:  Alert and cooperative, appears stated age, no acute distress  HEENT:  PERRLA, EOMI, no scleral icterus, no conjunctival pallor  Neck:  No lymphadenopathy, no thyromegaly, no carotid bruits, no elevated JVP  Heart:  Regular rate and rhythm, normal S1/S2, no S3/S4, no murmur, rubs or gallops.  PMI nondisplaced  Lungs:  Clear to auscultation bilaterally, no wheezes rales or rhonchi  Abdomen:  Soft, non-tender, positive bowel sounds, no rebound or guarding,   no organomegaly   Extremities:  Normal range of motion.  No clubbing, cyanosis or edema   Vascular:  2+ pedal pulses  Skin:  No rashes or lesions on exposed skin  Neurologic:  Cranial nerves II-XII grossly intact without focal deficits  Psych:  Normal mood and affect

## 2024-05-17 ENCOUNTER — TELEPHONE (OUTPATIENT)
Age: 79
End: 2024-05-17

## 2024-05-17 NOTE — TELEPHONE ENCOUNTER
Patient calls stating he is supposed to receive a monitor in the mail and is asking for a status update on where it is.    Please advise.

## 2024-05-17 NOTE — TELEPHONE ENCOUNTER
Called pt's insurance, spoke with [employer] representative. No prior auth is required.    Reference #:[id number]    2 week cornelius ordered.       Called pt. Left a vm- expected delivery in 2-3 business days.

## 2024-05-24 ENCOUNTER — ANESTHESIA EVENT (OUTPATIENT)
Dept: PERIOP | Facility: AMBULARY SURGERY CENTER | Age: 79
End: 2024-05-24
Payer: COMMERCIAL

## 2024-05-30 NOTE — PRE-PROCEDURE INSTRUCTIONS
Pre-Surgery Instructions:   Medication Instructions    albuterol (Ventolin HFA) 90 mcg/act inhaler Uses PRN- OK to take day of surgery    amLODIPine (NORVASC) 5 mg tablet Take day of surgery.    anagrelide (AGRYLIN) 0.5 MG capsule Instructions provided by MD. Prescribed by hematology    carvedilol (COREG) 25 mg tablet Take day of surgery.    cholecalciferol (VITAMIN D3) 1,000 units tablet Stop taking 7 days prior to surgery.    Thiamine HCl (VITAMIN B1 PO) Stop taking 7 days prior to surgery.      Medication instructions for day surgery reviewed. Please use only a sip of water to take your instructed medications. Avoid all over the counter vitamins, supplements and NSAIDS for one week prior to surgery per anesthesia guidelines. Tylenol is ok to take as needed.     You will receive a call one business day prior to surgery with an arrival time and hospital directions. If your surgery is scheduled on a Monday, the hospital will be calling you on the Friday prior to your surgery. If you have not heard from anyone by 8pm, please call the hospital supervisor through the hospital  at [phone]. ([place] [phone] or [place] [phone]).    Do not eat or drink anything after midnight the night before your surgery, including candy, mints, lifesavers, or chewing gum. Do not drink alcohol 24hrs before your surgery. Try not to smoke at least 24hrs before your surgery.       Follow the pre surgery showering instructions as listed in the “My Surgical Experience Booklet” or otherwise provided by your surgeon's office. Do not use a blade to shave the surgical area 1 week before surgery. It is okay to use a clean electric clippers up to 24 hours before surgery. Do not apply any lotions, creams, including makeup, cologne, deodorant, or perfumes after showering on the day of your surgery. Do not use dry shampoo, hair spray, hair gel, or any type of hair products.     No contact lenses, eye make-up, or artificial  eyelashes. Remove nail polish, including gel polish, and any artificial, gel, or acrylic nails if possible. Remove all jewelry including rings and body piercing jewelry.     Wear causal clothing that is easy to take on and off. Consider your type of surgery.    Keep any valuables, jewelry, piercings at home. Please bring any specially ordered equipment (sling, braces) if indicated.    Arrange for a responsible person to drive you to and from the hospital on the day of your surgery. Please confirm the visitor policy for the day of your procedure when you receive your phone call with an arrival time.     Call the surgeon's office with any new illnesses, exposures, or additional questions prior to surgery.    Please reference your “My Surgical Experience Booklet” for additional information to prepare for your upcoming surgery.

## 2024-06-06 DIAGNOSIS — E78.00 HYPERCHOLESTEROLEMIA: ICD-10-CM

## 2024-06-06 RX ORDER — CARVEDILOL 25 MG/1
25 TABLET ORAL 2 TIMES DAILY WITH MEALS
Qty: 180 TABLET | Refills: 1 | Status: SHIPPED | OUTPATIENT
Start: 2024-06-06

## 2024-06-07 ENCOUNTER — HOSPITAL ENCOUNTER (OUTPATIENT)
Facility: AMBULARY SURGERY CENTER | Age: 79
Setting detail: OUTPATIENT SURGERY
Discharge: HOME | End: 2024-06-07
Attending: COLON & RECTAL SURGERY | Admitting: COLON & RECTAL SURGERY
Payer: COMMERCIAL

## 2024-06-07 ENCOUNTER — ANESTHESIA (OUTPATIENT)
Dept: PERIOP | Facility: AMBULARY SURGERY CENTER | Age: 79
End: 2024-06-07
Payer: COMMERCIAL

## 2024-06-07 VITALS
TEMPERATURE: 97.6 F | WEIGHT: 179 LBS | HEIGHT: 63 IN | SYSTOLIC BLOOD PRESSURE: 128 MMHG | BODY MASS INDEX: 31.71 KG/M2 | DIASTOLIC BLOOD PRESSURE: 64 MMHG | RESPIRATION RATE: 20 BRPM | HEART RATE: 62 BPM | OXYGEN SATURATION: 96 %

## 2024-06-07 DIAGNOSIS — K60.3 ANAL FISTULA: Primary | ICD-10-CM

## 2024-06-07 PROCEDURE — 46275 REMOVE ANAL FIST INTER: CPT | Performed by: COLON & RECTAL SURGERY

## 2024-06-07 PROCEDURE — NC001 PR NO CHARGE: Performed by: COLON & RECTAL SURGERY

## 2024-06-07 RX ORDER — OXYCODONE HYDROCHLORIDE AND ACETAMINOPHEN 5; 325 MG/1; MG/1
1 TABLET ORAL EVERY 6 HOURS PRN
Qty: 8 TABLET | Refills: 0 | Status: SHIPPED | OUTPATIENT
Start: 2024-06-07 | End: 2024-06-12

## 2024-06-07 RX ORDER — ONDANSETRON 2 MG/ML
4 INJECTION INTRAMUSCULAR; INTRAVENOUS ONCE AS NEEDED
Status: DISCONTINUED | OUTPATIENT
Start: 2024-06-07 | End: 2024-06-07 | Stop reason: HOSPADM

## 2024-06-07 RX ORDER — DEXAMETHASONE SODIUM PHOSPHATE 10 MG/ML
INJECTION, SOLUTION INTRAMUSCULAR; INTRAVENOUS AS NEEDED
Status: DISCONTINUED | OUTPATIENT
Start: 2024-06-07 | End: 2024-06-07

## 2024-06-07 RX ORDER — MIDAZOLAM HYDROCHLORIDE 2 MG/2ML
INJECTION, SOLUTION INTRAMUSCULAR; INTRAVENOUS AS NEEDED
Status: DISCONTINUED | OUTPATIENT
Start: 2024-06-07 | End: 2024-06-07

## 2024-06-07 RX ORDER — LIDOCAINE HYDROCHLORIDE 10 MG/ML
INJECTION, SOLUTION EPIDURAL; INFILTRATION; INTRACAUDAL; PERINEURAL AS NEEDED
Status: DISCONTINUED | OUTPATIENT
Start: 2024-06-07 | End: 2024-06-07

## 2024-06-07 RX ORDER — OXYCODONE HYDROCHLORIDE AND ACETAMINOPHEN 5; 325 MG/1; MG/1
1 TABLET ORAL EVERY 4 HOURS PRN
Status: DISCONTINUED | OUTPATIENT
Start: 2024-06-07 | End: 2024-06-07 | Stop reason: HOSPADM

## 2024-06-07 RX ORDER — MAGNESIUM HYDROXIDE 1200 MG/15ML
LIQUID ORAL AS NEEDED
Status: DISCONTINUED | OUTPATIENT
Start: 2024-06-07 | End: 2024-06-07 | Stop reason: HOSPADM

## 2024-06-07 RX ORDER — BUPIVACAINE HYDROCHLORIDE 2.5 MG/ML
INJECTION, SOLUTION EPIDURAL; INFILTRATION; INTRACAUDAL AS NEEDED
Status: DISCONTINUED | OUTPATIENT
Start: 2024-06-07 | End: 2024-06-07 | Stop reason: HOSPADM

## 2024-06-07 RX ORDER — FENTANYL CITRATE/PF 50 MCG/ML
50 SYRINGE (ML) INJECTION
Status: DISCONTINUED | OUTPATIENT
Start: 2024-06-07 | End: 2024-06-07 | Stop reason: HOSPADM

## 2024-06-07 RX ORDER — PROPOFOL 10 MG/ML
INJECTION, EMULSION INTRAVENOUS AS NEEDED
Status: DISCONTINUED | OUTPATIENT
Start: 2024-06-07 | End: 2024-06-07

## 2024-06-07 RX ORDER — ROCURONIUM BROMIDE 10 MG/ML
INJECTION, SOLUTION INTRAVENOUS AS NEEDED
Status: DISCONTINUED | OUTPATIENT
Start: 2024-06-07 | End: 2024-06-07

## 2024-06-07 RX ORDER — SODIUM CHLORIDE, SODIUM LACTATE, POTASSIUM CHLORIDE, CALCIUM CHLORIDE 600; 310; 30; 20 MG/100ML; MG/100ML; MG/100ML; MG/100ML
INJECTION, SOLUTION INTRAVENOUS CONTINUOUS PRN
Status: DISCONTINUED | OUTPATIENT
Start: 2024-06-07 | End: 2024-06-07

## 2024-06-07 RX ORDER — ONDANSETRON 2 MG/ML
INJECTION INTRAMUSCULAR; INTRAVENOUS AS NEEDED
Status: DISCONTINUED | OUTPATIENT
Start: 2024-06-07 | End: 2024-06-07

## 2024-06-07 RX ORDER — FENTANYL CITRATE 50 UG/ML
INJECTION, SOLUTION INTRAMUSCULAR; INTRAVENOUS AS NEEDED
Status: DISCONTINUED | OUTPATIENT
Start: 2024-06-07 | End: 2024-06-07

## 2024-06-07 RX ADMIN — SUGAMMADEX 200 MG: 100 INJECTION, SOLUTION INTRAVENOUS at 13:55

## 2024-06-07 RX ADMIN — LIDOCAINE HYDROCHLORIDE 50 MG: 10 INJECTION, SOLUTION EPIDURAL; INFILTRATION; INTRACAUDAL; PERINEURAL at 13:27

## 2024-06-07 RX ADMIN — PROPOFOL 50 MG: 10 INJECTION, EMULSION INTRAVENOUS at 13:31

## 2024-06-07 RX ADMIN — MIDAZOLAM HYDROCHLORIDE 1 MG: 1 INJECTION, SOLUTION INTRAMUSCULAR; INTRAVENOUS at 13:23

## 2024-06-07 RX ADMIN — FENTANYL CITRATE 50 MCG: 50 INJECTION INTRAMUSCULAR; INTRAVENOUS at 13:27

## 2024-06-07 RX ADMIN — ROCURONIUM BROMIDE 30 MG: 10 INJECTION INTRAVENOUS at 13:27

## 2024-06-07 RX ADMIN — PROPOFOL 130 MG: 10 INJECTION, EMULSION INTRAVENOUS at 13:27

## 2024-06-07 RX ADMIN — DEXAMETHASONE SODIUM PHOSPHATE 10 MG: 10 INJECTION INTRAMUSCULAR; INTRAVENOUS at 13:28

## 2024-06-07 RX ADMIN — SODIUM CHLORIDE, SODIUM LACTATE, POTASSIUM CHLORIDE, AND CALCIUM CHLORIDE: .6; .31; .03; .02 INJECTION, SOLUTION INTRAVENOUS at 12:34

## 2024-06-07 RX ADMIN — ONDANSETRON 4 MG: 2 INJECTION INTRAMUSCULAR; INTRAVENOUS at 13:28

## 2024-06-07 NOTE — OP NOTE
Bedside and Verbal shift change report given to Gloris Sacks, LPN (oncoming nurse) by Mirtha Fontaine RN (offgoing nurse). Report included the following information SBAR, Kardex, MAR and Recent Results.     SITUATION:    Code Status: Full Code  Reason for Admission: COPD exacerbation ([facility]   COPD with acute exacerbation ([facility]    [facility] day: 2   Problem List:       Hospital Problems  Date Reviewed: 3/10/2017          Codes Class Noted POA    Essential hypertension ICD-10-CM: I10  ICD-9-CM: 401.9  3/10/2017 Unknown        Morbid obesity due to excess calories ([facility] [facility] ICD-10-CM: E66.01  ICD-9-CM: 278.01  3/10/2017 Unknown        Chest pain on breathing ICD-10-CM: R07.1  ICD-9-CM: 786.52  3/10/2017 Unknown        COPD with acute exacerbation ([facility] [facility] ICD-10-CM: J44.1  ICD-9-CM: 491.21  3/2/2017 Unknown        COPD exacerbation ([facility] [address] [facility] ICD-10-CM: J44.1  ICD-9-CM: 491.21  3/12/2016 Unknown              BACKGROUND:    Past Medical History:   Past Medical History:   Diagnosis Date    Asthma     Chronic obstructive pulmonary disease ([facility]     Endocrine disease     thyroid issues    Gastrointestinal disorder     \"blockage in my stomach\"    Hypertension          Patient taking anticoagulants Refuses Heparin    ASSESSMENT:    Changes in Assessment Throughout Shift: none     Patient has Central Line: no    Patient has Walker Cath: no      Last Vitals:     Vitals:    03/11/17 1636 03/11/17 2102 03/11/17 2312 03/12/17 0109   BP: 171/82  105/54 132/64   Pulse: 86  98 76   Resp: 18  20 20   Temp: 97.7 °F (36.5 °C)  97.6 °F (36.4 °C) 97.4 °F (36.3 °C)   SpO2: 99% 98% 96% 96%   Weight:       Height:            IV and DRAINS (will only show if present)   Peripheral IV 03/10/17 Left Hand-Site Assessment: Clean, dry, & intact  [REMOVED] Peripheral IV 03/10/17 Right Antecubital-Site Assessment: Clean, dry, & intact     WOUND (if present)   Wound Type:  none   Dressing present Dressing Present : No   Wound Concerns/Notes: OPERATIVE REPORT  PATIENT NAME: Adarsh Sheppard    :  1945  MRN: [id number]  Pt [facility]: [facility]    SURGERY DATE: 2024    Surgeons and Role:     * JOSE ALFREDO Ann MD - Primary    Preop Diagnosis:  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  Small associated hemorrhoid at the fistula site.  Other redundant hemorrhoids are mild.    Complications:   None    Procedure and Technique:  The patient was placed in a prone jackknife position with the buttocks taped apart.  The anal area was prepped using Betadine and draped in a sterile manner.  A timeout was done.    Inspection revealed a right posterior/lateral redundant hemorrhoid.  The exam otherwise was normal.  Digital exam showed no other findings but there was some scarring at the right posterior lateral position.  Anoscopy revealed an internal opening at the dentate line in the right posterolateral position.  This was probed in the intersphincteric plane, distally.  A fistulotomy was performed over the probe.  Careful inspection revealed no proximal extension of the fistula.  The fistula appeared to be very chronic.  There was no trans sphincteric extension of the fistula.  A butterfly pattern wound was left in position with a central, well epithelialized base.  I chose to marsupialize the edges closed using 4-0 Vicryl suture.  The distal, perianal portion of the defect was left open.  There was no bleeding at this time.    Sponge needle and instrument counts were correct.    The area was  anesthetized with quarter percent bupivacaine.    I was present for the entire procedure.    Patient Disposition:  PACU         SIGNATURE: AYDIN Ann MD  DATE: June 7, 2024  TIME: 1:55 PM                 none     PAIN    Pain Assessment    Pain Intensity 1: 0 (03/12/17 0740)    Pain Location 1: Rib cage    Pain Intervention(s) 1: Medication (see MAR)    Patient Stated Pain Goal: 0  o Interventions for Pain:  Percocet  o Intervention effective: yes  o Time of last intervention: 1847  o Reassessment Completed: yes      Last 3 Weights:  Last 3 Recorded Weights in this Encounter    03/10/17 0352   Weight: 114.3 kg (252 lb)     Weight change:      INTAKE/OUPUT    Current Shift:      Last three shifts:       LAB RESULTS     Recent Labs      03/12/17   0443  03/11/17   0414  03/10/17   0400   WBC  13.5*  12.8  21.3*   HGB  13.5  13.6  15.1   HCT  42.2  42.4  46.0*   PLT  221  246  273        Recent Labs      03/12/17   0443  03/11/17   0414  03/10/17   0400   NA  135*  137  135*   K  4.9  4.3  3.8   GLU  149*  155*  149*   BUN  24*  24*  34*   CREA  0.81  0.89  1.10   CA  8.1*  8.1*  7.9*   MG   --    --   2.7*       RECOMMENDATIONS AND DISCHARGE PLANNING     1. Pending tests/procedures/ Plan of Care or Other Needs: Need sputum for gram stain and culture     2. Discharge plan for patient and Needs/Barriers: Home    3. Estimated Discharge Date: 3/13/2017 Posted on Whiteboard in [facility] Room: yes      4. The patient's care plan was reviewed with the oncoming nurse. \"HEALS\" SAFETY CHECK      Fall Risk    Total Score: 4    Safety Measures: Safety Measures: Bed/Chair-Wheels locked, Bed in low position, Call light within reach    A safety check occurred in the patient's room between off going nurse and oncoming nurse listed above.     The safety check included the below items  Area Items   H  High Alert Medications - Verify all high alert medication drips (heparin, PCA, etc.)   E  Equipment - Suction is set up for ALL patients (with yanker)  - Red plugs utilized for all equipment (IV pumps, etc.)  - WOWs wiped down at end of shift.  - Room stocked with oxygen, suction, and other unit-specific supplies   A  Alarms - Bed alarm is set for fall risk patients  - Ensure chair alarm is in place and activated if patient is up in a chair   L  Lines - Check IV for any infiltration  - Walker bag is empty if patient has a Walker   - Tubing and IV bags are labeled   S  Safety   - Room is clean, patient is clean, and equipment is clean. - Hallways are clear from equipment besides carts. - Fall bracelet on for fall risk patients  - Ensure room is clear and free of clutter  - Suction is set up for ALL patients (with yanker)  - Hallways are clear from equipment besides carts.    - Isolation precautions followed, supplies available outside room, sign posted     Giselle Nava RN

## 2024-06-07 NOTE — ANESTHESIA PREPROCEDURE EVALUATION
Procedure:  FISTULOTOMY (Abdomen)    Relevant Problems   CARDIO   (+) Benign essential HTN   (+) Coronary artery disease involving native coronary artery of native heart without angina pectoris (S/p cabg 10 years ago)   (+) Hypercholesterolemia      ENDO   (+) Type 2 diabetes mellitus with stage 3a chronic kidney disease, without long-term current use of insulin (HCC)      /RENAL   (+) BPH (benign prostatic hyperplasia)   (+) CKD (chronic kidney disease) stage 3, GFR 30-59 ml/min (HCC)      HEMATOLOGY   (+) Anemia      MUSCULOSKELETAL   (+) Acute gouty arthritis   (+) Adolescent idiopathic scoliosis of thoracolumbar region   (+) Scoliosis      NEURO/PSYCH   (+) Claudication (HCC)   (+) Transient ischemic attack      Other   (+) Chronic myeloproliferative disease (HCC)     TTE 3/22/24    Technically difficult study   Left Ventricle   Left ventricle is normal in size. There is moderate concentric hypertrophy. Systolic function is normal with an ejection fraction of 60-65%. Akinesis of the basal inferior wall and basal septum. Anteroseptal wall hypokinesis. There is grade II (moderate) diastolic dysfunction.     Right Ventricle   Right ventricle cavity is normal. Systolic function is normal.     EKG 6/2023  NSR    Preop cardiology clearance  He had a recent small CVA at an outside hospital.  Unclear etiology.  MRI suggested carotid disease but Doppler showed this was much less significant.  He was started on anagrelide for thrombocytopenia.  At this point recommend 2-week ambulatory monitor to screen for any atrial fibrillation.  Blood pressure is well-controlled he is now taking his statin on a regular basis.  Will follow-up with him in 6 months.  He has a neurology appointment coming up on the 20th of  Testing I will review with him.  Echocardiogram showed no structural change.  Physical Exam    Airway    Mallampati score: II  TM Distance: >3 FB  Neck ROM: full     Dental    "    Cardiovascular      Pulmonary      Other Findings        Anesthesia Plan  ASA Score- 3     Anesthesia Type- general with ASA Monitors.         Additional Monitors:     Airway Plan: ETT.    Comment: Recent labs personally reviewed:  Lab Results       Component                Value               Date                       WBC                      7.6                 09/06/2023                 HGB                      13.8                09/06/2023                 PLT                      674 (H)             09/06/2023            Lab Results       Component                Value               Date                       NA                       139                 07/21/2015                 K                        4.0                 03/23/2024                 BUN                      21                  03/23/2024                 CREATININE               1.29                03/23/2024                 GLUCOSE                  95                  07/21/2015            No results found for: \"PTT\"   Lab Results       Component                Value               Date                       INR                      1.2                 03/21/2024              Blood type       I, Kristen Szymanski MD, have personally seen and evaluated the patient prior to anesthetic care.  I have reviewed the pre-anesthetic record, medical history, allergies, medications and any other medical records if appropriate to the anesthetic care.  If a CRNA is involved in the case, I have reviewed the CRNA assessment, if present, and agree. I consented the patient for general anesthesia with appropriate airway support as indicated. We reviewed the risks associated including PONV, sore throat, allergic reaction to anesthetics and management plan to address these issues. We discussed the indication and risks associated with any invasive monitors that would be placed. We discussed post op pain control and expectations. We discussed rare complications " including hypoxia, perioperative cardiac and neurologic events, and death based on the patient's baseline risk. All questions and concerns were addressed.     .       Plan Factors-Exercise tolerance (METS): >4 METS.    Chart reviewed. EKG reviewed. Imaging results reviewed. Existing labs reviewed. Patient summary reviewed.    Patient is not a current smoker.  Patient did not smoke on day of surgery.    Obstructive sleep apnea risk education given perioperatively.        Induction- intravenous.    Postoperative Plan-     Perioperative Resuscitation Plan - Level 1 - Full Code.       Informed Consent- Anesthetic plan and risks discussed with patient.  I personally reviewed this patient with the CRNA. Discussed and agreed on the Anesthesia Plan with the CRNA..

## 2024-06-07 NOTE — H&P
History and Physical   Colon and Rectal Surgery   Adarsh Sheppard 78 y.o. male MRN: [id number]  Unit/Bed#: OR [place] Encounter: [id number]  06/07/24   12:08 PM      CC:  Hemorrhoid, anal fistula    History of Present Illness   HPI:  Adarsh Sheppard is a 78 y.o. male for surgical treatment.  Historical Information   Past Medical History:   Diagnosis Date    Actinic keratosis     RESOLVED: [id number]    Adolescent idiopathic scoliosis of thoracolumbar region 04/12/2018    Allergic     Anxiety     Arthritis     Basal cell carcinoma of back     RESOLVED: [id number]    Basal cell carcinoma of skin of upper extremity, including shoulder     BCC (basal cell carcinoma), trunk     BCE (basal cell epithelioma), trunk     RESOLVED: [id number]    Benign neoplasm of skin of face     Chronic kidney disease     COPD (chronic obstructive pulmonary disease) (HCC)     Coronary artery disease involving native coronary artery of native heart without angina pectoris 10/16/2018    Depression 10/06/2015    Gastroesophageal reflux disease without esophagitis 04/21/2017    GERD (gastroesophageal reflux disease)     Hypertension     Myocardial infarction (HCC)     Neoplasm of uncertain behavior of skin     Nonmelanoma skin cancer     LAST ASSESSED: 12MAY2015    Pneumonia     Scoliosis     Seborrheic keratosis     RESOLVED: [id number]    Stroke (HCC)      Past Surgical History:   Procedure Laterality Date    ANAL FISTULOTOMY  06/13/2011    DR. SANCHEZ    BASAL CELL CARCINOMA EXCISION  11/2023    back    CORONARY ARTERY BYPASS GRAFT  05/17/2005    TRIPLE; DR. PETTY [facility]     HEMORRHOID SURGERY  10/2009    DR. MARRERO    IR BIOPSY BONE MARROW  07/24/2023    JOINT REPLACEMENT      shoulder replacement    KNEE SURGERY      SHOULDER SURGERY  06/18/2015    JOINT REPLACEMENT; DR. MONTGOMERY    SPINE SURGERY  12/2020    spine fusion       Meds/Allergies       Medications Prior to Admission:     albuterol (Ventolin HFA) 90 mcg/act  inhaler    amLODIPine (NORVASC) 5 mg tablet    anagrelide (AGRYLIN) 0.5 MG capsule    cholecalciferol (VITAMIN D3) 1,000 units tablet    Thiamine HCl (VITAMIN B1 PO)    allopurinol (ZYLOPRIM) 100 mg tablet    carvedilol (COREG) 25 mg tablet    clopidogrel (PLAVIX) 75 mg tablet    colchicine (COLCRYS) 0.6 mg tablet    Multiple Vitamin (MULTIVITAMIN ADULT PO)    oxyCODONE (ROXICODONE) 5 immediate release tablet    rosuvastatin (CRESTOR) 10 MG tablet    No current facility-administered medications for this encounter.    Allergies   Allergen Reactions    Other      [place] trees    Hydroxyurea Rash         Social History   Social History     Substance and Sexual Activity   Alcohol Use Yes    Alcohol/week: 1.0 standard drink of alcohol    Types: 1 Glasses of wine per week    Comment: occasionally     Social History     Substance and Sexual Activity   Drug Use No     Social History     Tobacco Use   Smoking Status Former    Types: Cigars   Smokeless Tobacco Never   Tobacco Comments    one cigar once in awhile         Family History:   Family History   Problem Relation Age of Onset    Other Mother         BRAIN TUMOR    No Known Problems Father     Vascular Disease Brother     Cancer Brother     Dementia Brother     Heart disease Brother     COPD Brother     Prostate cancer Brother     Substance Abuse Brother     Drug abuse Brother          Objective     Current Vitals:      No intake or output data in the 24 hours ending 06/07/24 1208    Physical Exam:  General:  Well nourished, no distress.  Neuro: Alert and oriented  Eyes:Sclera anicteric, conjunctiva pink.  Pulm: Clear to auscultation bilaterally. No respiratory Distress.   CV:  Regular rate and rhythm. No murmurs.  Abdomen:  Soft, flat, non-tender, without masses or hepatosplenomegaly.  Head and all 4s ok    Lab Results:       Assessment: Adarsh Sheppard is a 78 y.o. male who has anal irritation and swelling.        Plan:   Anal fistula  There is a left posterior anal  fistula that is superficial.  This appears to be very chronic.  Options of observation versus fistulotomy were discussed.  Fistulotomy was chosen.  We will do this at her earliest convenience in the operating room.     Risks, not limited to infection, bleeding, pain, persistent disease, need for future surgery, fecal incontinence, urinary retention, or nonhealing wounds were reviewed at length.  Questions were answered.       Grade I hemorrhoids  There is a right posterior hemorrhoidal nodule.  Examination under anesthesia with biopsy or hemorrhoidectomy is recommended.  I will try to limit the extent of therapy at this site but will include any nodular tissue that may be causing the patient's anal symptoms of irritation and bleeding.     Risks, not limited to infection, bleeding, pain, persistent disease, need for future surgery, fecal incontinence, urinary retention, or nonhealing wounds were reviewed at length.  Questions were answered.    AYDIN Ann MD

## 2024-06-07 NOTE — ANESTHESIA POSTPROCEDURE EVALUATION
Post-Op Assessment Note    CV Status:  Stable  Pain Score: 0    Pain management: adequate       Mental Status:  Sleepy and arousable   Hydration Status:  Euvolemic   PONV Controlled:  Controlled   Airway Patency:  Patent     Post Op Vitals Reviewed: Yes    No anethesia notable event occurred.    Staff: Anesthesiologist, CRNA               /79 (06/07/24 1402)    Temp 97.6 °F (36.4 °C) (06/07/24 1402)    Pulse 57 (06/07/24 1402)   Resp 12 (06/07/24 1402)    SpO2 95 % (06/07/24 1402)

## 2024-06-10 ENCOUNTER — CLINICAL SUPPORT (OUTPATIENT)
Dept: CARDIOLOGY CLINIC | Facility: CLINIC | Age: 79
End: 2024-06-10
Payer: COMMERCIAL

## 2024-06-10 DIAGNOSIS — G45.9 TRANSIENT ISCHEMIC ATTACK: ICD-10-CM

## 2024-06-10 PROCEDURE — 93248 EXT ECG>7D<15D REV&INTERPJ: CPT | Performed by: INTERNAL MEDICINE

## 2024-06-11 ENCOUNTER — TELEPHONE (OUTPATIENT)
Dept: CARDIOLOGY CLINIC | Facility: CLINIC | Age: 79
End: 2024-06-11

## 2024-06-11 NOTE — TELEPHONE ENCOUNTER
----- Message from Jake Gottlieb DO sent at 6/11/2024  6:21 AM EDT -----  Lightheadedness didn't correlate with any abnormal heart rhythms

## 2024-06-19 LAB
ALBUMIN SERPL-MCNC: 4.1 G/DL (ref 3.8–4.8)
ALP SERPL-CCNC: 45 IU/L (ref 44–121)
ALT SERPL-CCNC: 17 IU/L (ref 0–44)
AST SERPL-CCNC: 15 IU/L (ref 0–40)
BASOPHILS # BLD AUTO: 0 X10E3/UL (ref 0–0.2)
BASOPHILS NFR BLD AUTO: 1 %
BILIRUB SERPL-MCNC: 0.3 MG/DL (ref 0–1.2)
BUN SERPL-MCNC: 19 MG/DL (ref 8–27)
BUN/CREAT SERPL: 15 (ref 10–24)
CALCIUM SERPL-MCNC: 8.8 MG/DL (ref 8.6–10.2)
CHLORIDE SERPL-SCNC: 111 MMOL/L (ref 96–106)
CHOLEST SERPL-MCNC: 96 MG/DL (ref 100–199)
CO2 SERPL-SCNC: 21 MMOL/L (ref 20–29)
CREAT SERPL-MCNC: 1.23 MG/DL (ref 0.76–1.27)
EGFR: 59 ML/MIN/1.73
EOSINOPHIL # BLD AUTO: 0.3 X10E3/UL (ref 0–0.4)
EOSINOPHIL NFR BLD AUTO: 3 %
ERYTHROCYTE [DISTWIDTH] IN BLOOD BY AUTOMATED COUNT: 15.3 % (ref 11.6–15.4)
GLOBULIN SER-MCNC: 1.6 G/DL (ref 1.5–4.5)
GLUCOSE SERPL-MCNC: 137 MG/DL (ref 70–99)
HCT VFR BLD AUTO: 38.6 % (ref 37.5–51)
HDLC SERPL-MCNC: 52 MG/DL
HGB BLD-MCNC: 12.7 G/DL (ref 13–17.7)
IMM GRANULOCYTES # BLD: 0.1 X10E3/UL (ref 0–0.1)
IMM GRANULOCYTES NFR BLD: 1 %
LDLC SERPL CALC-MCNC: 31 MG/DL (ref 0–99)
LYMPHOCYTES # BLD AUTO: 2 X10E3/UL (ref 0.7–3.1)
LYMPHOCYTES NFR BLD AUTO: 23 %
MCH RBC QN AUTO: 29.8 PG (ref 26.6–33)
MCHC RBC AUTO-ENTMCNC: 32.9 G/DL (ref 31.5–35.7)
MCV RBC AUTO: 91 FL (ref 79–97)
MONOCYTES # BLD AUTO: 0.7 X10E3/UL (ref 0.1–0.9)
MONOCYTES NFR BLD AUTO: 9 %
NEUTROPHILS # BLD AUTO: 5.6 X10E3/UL (ref 1.4–7)
NEUTROPHILS NFR BLD AUTO: 63 %
PLATELET # BLD AUTO: 400 X10E3/UL (ref 150–450)
POTASSIUM SERPL-SCNC: 4.9 MMOL/L (ref 3.5–5.2)
PROT SERPL-MCNC: 5.7 G/DL (ref 6–8.5)
RBC # BLD AUTO: 4.26 X10E6/UL (ref 4.14–5.8)
SL AMB VLDL CHOLESTEROL CALC: 13 MG/DL (ref 5–40)
SODIUM SERPL-SCNC: 144 MMOL/L (ref 134–144)
TRIGL SERPL-MCNC: 57 MG/DL (ref 0–149)
WBC # BLD AUTO: 8.7 X10E3/UL (ref 3.4–10.8)

## 2024-06-20 ENCOUNTER — RA CDI HCC (OUTPATIENT)
Dept: OTHER | Facility: HOSPITAL | Age: 79
End: 2024-06-20

## 2024-06-20 DIAGNOSIS — Z86.73 PERSONAL HISTORY OF TIA (TRANSIENT ISCHEMIC ATTACK): Primary | ICD-10-CM

## 2024-06-20 PROBLEM — M10.9 ACUTE GOUTY ARTHRITIS: Status: RESOLVED | Noted: 2018-11-01 | Resolved: 2024-06-20

## 2024-06-20 PROBLEM — E66.01 OBESITY, MORBID (HCC): Status: RESOLVED | Noted: 2021-03-09 | Resolved: 2024-06-20

## 2024-06-20 PROBLEM — I12.9 HYPERTENSIVE CKD (CHRONIC KIDNEY DISEASE): Status: ACTIVE | Noted: 2024-06-20

## 2024-06-20 PROBLEM — Z01.810 PREOP CARDIOVASCULAR EXAM: Status: RESOLVED | Noted: 2019-11-14 | Resolved: 2024-06-20

## 2024-06-20 NOTE — PROGRESS NOTES
HCC coding opportunities          Chart Reviewed number of suggestions sent to Provider: 1  I12.9     Patients Insurance     Medicare Insurance: [facility] Medicare Advantage

## 2024-06-27 ENCOUNTER — OFFICE VISIT (OUTPATIENT)
Dept: INTERNAL MEDICINE CLINIC | Age: 79
End: 2024-06-27
Payer: COMMERCIAL

## 2024-06-27 DIAGNOSIS — D47.3 ESSENTIAL THROMBOCYTHEMIA (HCC): ICD-10-CM

## 2024-06-27 DIAGNOSIS — N18.31 HYPERTENSIVE KIDNEY DISEASE WITH STAGE 3A CHRONIC KIDNEY DISEASE (HCC): ICD-10-CM

## 2024-06-27 DIAGNOSIS — I65.23 CAROTID ARTERY STENOSIS, ASYMPTOMATIC, BILATERAL: Primary | ICD-10-CM

## 2024-06-27 DIAGNOSIS — N18.31 TYPE 2 DIABETES MELLITUS WITH STAGE 3A CHRONIC KIDNEY DISEASE, WITHOUT LONG-TERM CURRENT USE OF INSULIN (HCC): ICD-10-CM

## 2024-06-27 DIAGNOSIS — I25.10 CORONARY ARTERY DISEASE INVOLVING NATIVE CORONARY ARTERY OF NATIVE HEART WITHOUT ANGINA PECTORIS: ICD-10-CM

## 2024-06-27 DIAGNOSIS — E78.00 HYPERCHOLESTEROLEMIA: ICD-10-CM

## 2024-06-27 DIAGNOSIS — D47.1 CHRONIC MYELOPROLIFERATIVE DISEASE (HCC): ICD-10-CM

## 2024-06-27 DIAGNOSIS — J42 CHRONIC BRONCHITIS, UNSPECIFIED CHRONIC BRONCHITIS TYPE (HCC): ICD-10-CM

## 2024-06-27 DIAGNOSIS — I73.9 CLAUDICATION (HCC): ICD-10-CM

## 2024-06-27 DIAGNOSIS — I12.9 HYPERTENSIVE KIDNEY DISEASE WITH STAGE 3A CHRONIC KIDNEY DISEASE (HCC): ICD-10-CM

## 2024-06-27 DIAGNOSIS — N18.31 STAGE 3A CHRONIC KIDNEY DISEASE (HCC): ICD-10-CM

## 2024-06-27 DIAGNOSIS — E11.22 TYPE 2 DIABETES MELLITUS WITH STAGE 3A CHRONIC KIDNEY DISEASE, WITHOUT LONG-TERM CURRENT USE OF INSULIN (HCC): ICD-10-CM

## 2024-06-27 PROCEDURE — G2211 COMPLEX E/M VISIT ADD ON: HCPCS | Performed by: INTERNAL MEDICINE

## 2024-06-27 PROCEDURE — 99214 OFFICE O/P EST MOD 30 MIN: CPT | Performed by: INTERNAL MEDICINE

## 2024-06-27 RX ORDER — AMOXICILLIN 500 MG/1
TABLET, FILM COATED ORAL
COMMUNITY
Start: 2024-05-09 | End: 2024-06-27

## 2024-06-27 NOTE — ASSESSMENT & PLAN NOTE
Lipid profile is in acceptable range.  Continue with present dose of Crestor along with low-fat diet

## 2024-06-27 NOTE — PROGRESS NOTES
Diabetic Foot Exam    Patient's shoes and socks removed.    Right Foot/Ankle   Right Foot Inspection  Skin Exam: skin normal and skin intact. No dry skin, no warmth, no callus, no erythema, no maceration, no abnormal color, no pre-ulcer, no ulcer and no callus.     Sensory   Monofilament testing: intact    Vascular  The right DP pulse is 2+. The right PT pulse is 2+.     Left Foot/Ankle  Left Foot Inspection  Skin Exam: skin normal and skin intact. No dry skin, no warmth, no erythema, no maceration, normal color, no pre-ulcer, no ulcer and no callus.     Toe Exam: ROM and strength within normal limits.     Sensory   Monofilament testing: intact    Vascular  The left DP pulse is 2+. The left PT pulse is 2+.     Assign Risk Category  No deformity present  No loss of protective sensation  No weak pulses  Risk: 0

## 2024-06-27 NOTE — ASSESSMENT & PLAN NOTE
Lab Results   Component Value Date    EGFR 59 (L) 06/18/2024    EGFR 56 (L) 03/23/2024    EGFR 54 (L) 03/22/2024    CREATININE 1.23 06/18/2024    CREATININE 1.29 03/23/2024    CREATININE 1.32 (H) 03/22/2024   Renal function is stable.  Advised for adequate oral hydration and to avoid nephrotoxic meds

## 2024-06-27 NOTE — PROGRESS NOTES
Assessment/Plan:    Carotid artery stenosis, asymptomatic, bilateral  Continue with antiplatelet and statin.  Also managed by cardiology    Coronary artery disease involving native coronary artery of native heart without angina pectoris  No chest pain shortness of breath.  Continue with present regimen and also managed by cardiology    Chronic bronchitis, unspecified chronic bronchitis type ([facility])  Asymptomatic now.  And even stop using albuterol inhaler    Type 2 diabetes mellitus with stage 3a chronic kidney disease, without long-term current use of insulin ([facility])    Lab Results   Component Value Date    HGBA1C 6.3 (H) 03/22/2024   Well-controlled on diabetic diet.  Will continue to monitor    CKD (chronic kidney disease) stage 3, GFR 30-59 ml/min ([facility])  Lab Results   Component Value Date    EGFR 59 (L) 06/18/2024    EGFR 56 (L) 03/23/2024    EGFR 54 (L) 03/22/2024    CREATININE 1.23 06/18/2024    CREATININE 1.29 03/23/2024    CREATININE 1.32 (H) 03/22/2024   Renal function is stable.  Advised for adequate oral hydration and to avoid nephrotoxic meds    Essential thrombocythemia ([facility])  Platelet count within normal range.  Managed by hematology    Chronic myeloproliferative disease ([facility])  Stable.  Managed by hematology/oncology    Claudication ([facility])  Arterial Doppler of bilateral lower extremity requested.  Continue with antiplatelet and statin    Hypercholesterolemia  Lipid profile is in acceptable range.  Continue with present dose of Crestor along with low-fat diet       Diagnoses and all orders for this visit:    Carotid artery stenosis, asymptomatic, bilateral    Type 2 diabetes mellitus with stage 3a chronic kidney disease, without long-term current use of insulin ([facility])  -     Albumin / creatinine urine ratio; Future  -     Hemoglobin A1C; Future    Coronary artery disease involving native coronary artery of native heart without angina pectoris    Chronic bronchitis, unspecified chronic bronchitis type  (HCC)    Chronic myeloproliferative disease (HCC)    Essential thrombocythemia (HCC)    Claudication (HCC)  -     VAS ARTERIAL DUPLEX- LOWER LIMB BILATERAL; Future    Stage 3a chronic kidney disease (HCC)  -     Comprehensive metabolic panel; Future    Hypertensive kidney disease with stage 3a chronic kidney disease (HCC)    Hypercholesterolemia    Other orders  -     Discontinue: amoxicillin (AMOXIL) 500 MG tablet;  (Patient not taking: Reported on 6/27/2024)               Subjective:          Patient ID: Adarsh Sheppard is a 78 y.o. male.    Patient is here for follow-up.  Also blood work done would like to discuss results.  Also complaining of pain/discomfort and feeling of tightness after walking.  Leg discomfort improved with rest.  No chest pain or shortness of breath        The following portions of the patient's history were reviewed and updated as appropriate: allergies, current medications, past family history, past medical history, past social history, past surgical history, and problem list.    Review of Systems   Constitutional:  Negative for fatigue and fever.   HENT:  Negative for congestion, ear discharge, ear pain, postnasal drip, sinus pressure, sore throat, tinnitus and trouble swallowing.    Eyes:  Negative for discharge, itching and visual disturbance.   Respiratory:  Negative for cough and shortness of breath.    Cardiovascular:  Negative for chest pain and palpitations.        Claudication of bilateral lower extremity   Gastrointestinal:  Negative for abdominal pain, diarrhea, nausea and vomiting.   Endocrine: Negative for cold intolerance and polyuria.   Genitourinary:  Negative for difficulty urinating, dysuria and urgency.   Musculoskeletal:  Negative for arthralgias and neck pain.   Skin:  Negative for rash.   Allergic/Immunologic: Negative for environmental allergies.   Neurological:  Negative for dizziness, weakness and headaches.   Psychiatric/Behavioral:  Negative for agitation and  behavioral problems. The patient is not nervous/anxious.          Past Medical History:   Diagnosis Date    Actinic keratosis     RESOLVED: [id number]    Adolescent idiopathic scoliosis of thoracolumbar region 04/12/2018    Allergic     Anxiety     Arthritis     Basal cell carcinoma of back     RESOLVED: [id number]    Basal cell carcinoma of skin of upper extremity, including shoulder     BCC (basal cell carcinoma), trunk     BCE (basal cell epithelioma), trunk     RESOLVED: [id number]    Benign neoplasm of skin of face     Chronic kidney disease     COPD (chronic obstructive pulmonary disease) ([facility])     Coronary artery disease involving native coronary artery of native heart without angina pectoris 10/16/2018    Depression 10/06/2015    Gastroesophageal reflux disease without esophagitis 04/21/2017    GERD (gastroesophageal reflux disease)     Hypertension     Myocardial infarction (HCC)     Neoplasm of uncertain behavior of skin     Nonmelanoma skin cancer     LAST ASSESSED: 12MAY2015    Pneumonia     Scoliosis     Seborrheic keratosis     RESOLVED: [id number]    Stroke ([facility])          Current Outpatient Medications:     allopurinol (ZYLOPRIM) 100 mg tablet, Take 1 tablet (100 mg total) by mouth daily (Patient taking differently: Take 100 mg by mouth daily As needed), Disp: 90 tablet, Rfl: 1    amLODIPine (NORVASC) 5 mg tablet, Take 1 tablet (5 mg total) by mouth daily, Disp: 90 tablet, Rfl: 2    anagrelide (AGRYLIN) 0.5 MG capsule, Take 0.5 mg by mouth 2 (two) times a day, Disp: , Rfl:     carvedilol (COREG) 25 mg tablet, Take 1 tablet (25 mg total) by mouth 2 (two) times a day with meals, Disp: 180 tablet, Rfl: 1    cholecalciferol (VITAMIN D3) 1,000 units tablet, Take 2,000 Units by mouth 2 (two) times a week, Disp: , Rfl:     rosuvastatin (CRESTOR) 10 MG tablet, Take 1 tablet (10 mg total) by mouth daily at bedtime (Patient taking differently: Take 10 mg by mouth daily), Disp: 90 tablet, Rfl: 1    Thiamine HCl  (VITAMIN B1 PO), Take 1 tablet by mouth in the morning, Disp: , Rfl:     colchicine (COLCRYS) 0.6 mg tablet, Take 1 tablet (0.6 mg total) by mouth daily (Patient not taking: Reported on 5/9/2024), Disp: 30 tablet, Rfl: 1    Allergies   Allergen Reactions    Other      [place] trees    Hydroxyurea Rash       Social History   Past Surgical History:   Procedure Laterality Date    ANAL FISTULOTOMY  06/13/2011    DR. SANCHEZ    BASAL CELL CARCINOMA EXCISION  11/2023    back    CORONARY ARTERY BYPASS GRAFT  05/17/2005    TRIPLE; DR. PETTY SURGEON [facility]     HEMORRHOID SURGERY  10/2009    DR. MARRERO    IR BIOPSY BONE MARROW  07/24/2023    JOINT REPLACEMENT      shoulder replacement    KNEE SURGERY      [place] SURG [place] ANAL FISTULA INTERSPHINCTERIC N/A 6/7/2024    Procedure: ANAL FISTULOTOMY;  Surgeon: JOSE ALFREDO Ann MD;  [facility];  Service: Colorectal    SHOULDER SURGERY  06/18/2015    JOINT REPLACEMENT; DR. MONTGOMERY    SPINE SURGERY  12/2020    spine fusion     Family History   Problem Relation Age of Onset    Other Mother         BRAIN TUMOR    No Known Problems Father     Vascular Disease Brother     Cancer Brother     Dementia Brother     Heart disease Brother     COPD Brother     Prostate cancer Brother     Substance Abuse Brother     Drug abuse Brother        Objective:  There were no vitals taken for this visit.  There is no height or weight on file to calculate BMI.     Physical Exam  Constitutional:       General: He is not in acute distress.     Appearance: He is well-developed.   HENT:      Head: Normocephalic.      Right Ear: Ear canal and external ear normal.      Left Ear: Ear canal and external ear normal.      Nose: No rhinorrhea.      Mouth/Throat:      Pharynx: No posterior oropharyngeal erythema.   Eyes:      General: No scleral icterus.     Pupils: Pupils are equal, round, and reactive to light.   Neck:      Thyroid: No thyromegaly.      Trachea: No tracheal deviation.    Cardiovascular:      Rate and Rhythm: Normal rate and regular rhythm.      Heart sounds: Normal heart sounds.      Comments: Dorsalis pedis and posterior tibial pulses are 1+ bilaterally  Pulmonary:      Effort: Pulmonary effort is normal. No respiratory distress.      Breath sounds: Normal breath sounds.   Chest:      Chest wall: No tenderness.   Abdominal:      General: Bowel sounds are normal.      Palpations: Abdomen is soft. There is no mass.      Tenderness: There is no abdominal tenderness.   Musculoskeletal:         General: Normal range of motion.      Cervical back: Normal range of motion and neck supple. No rigidity.      Right lower leg: No edema.      Left lower leg: No edema.   Lymphadenopathy:      Cervical: No cervical adenopathy.   Skin:     General: Skin is warm.      Findings: No erythema or rash.   Neurological:      Mental Status: He is alert. He is disoriented.      Cranial Nerves: No cranial nerve deficit.   Psychiatric:         Mood and Affect: Mood normal.         Behavior: Behavior normal.

## 2024-07-17 DIAGNOSIS — E78.00 HYPERCHOLESTEROLEMIA: ICD-10-CM

## 2024-07-17 NOTE — PROGRESS NOTES
Colon and Rectal Surgery   Adarsh Sheppard 79 y.o. male MRN [id number]  Encounter: [id number]  07/22/24 1:29 PM            Assessment: Adarsh Sheppard is a 79 y.o. male who had fistulotomy      Plan:   Grade I hemorrhoids  Healing well.    Anal fistula  Tiny skin bridge in the right posterior perianal area within 1.5 cm of the anal verge. Probed through and 3 with a tiny probe.  I recommend this be unroofed at a separate visit with cautery available.      Subjective     HPI    Adarsh Sheppard is a 79 y.o. male who is here for a postoperative visit for, anal fistula, grade l hemorrhoids. 6/7/24    Patient states lots of itching, when doing exercises.  He has constipation, off and on.       Patient underwent an anal fistulotomy and a small hemorrhoid removal at fistula site, on 6/7/24        Last colonoscopy done 10/13/21       Historical Information   Past Medical History:   Diagnosis Date    Actinic keratosis     RESOLVED: [id number]    Adolescent idiopathic scoliosis of thoracolumbar region 04/12/2018    Allergic     Anxiety     Arthritis     Basal cell carcinoma of back     RESOLVED: [id number]    Basal cell carcinoma of skin of upper extremity, including shoulder     BCC (basal cell carcinoma), trunk     BCE (basal cell epithelioma), trunk     RESOLVED: [id number]    Benign neoplasm of skin of face     Chronic kidney disease     COPD (chronic obstructive pulmonary disease) (HCC)     Coronary artery disease involving native coronary artery of native heart without angina pectoris 10/16/2018    Depression 10/06/2015    Gastroesophageal reflux disease without esophagitis 04/21/2017    GERD (gastroesophageal reflux disease)     Hypertension     Myocardial infarction (HCC)     Neoplasm of uncertain behavior of skin     Nonmelanoma skin cancer     LAST ASSESSED: 12MAY2015    Pneumonia     Scoliosis     Seborrheic keratosis     RESOLVED: [id number]    Stroke (HCC)      Past Surgical History:   Procedure Laterality Date    ANAL  FISTULOTOMY  06/13/2011    DR. SANCHEZ    BASAL CELL CARCINOMA EXCISION  11/2023    back    CORONARY ARTERY BYPASS GRAFT  05/17/2005    TRIPLE; DR. PETTY SURGEON [facility]     HEMORRHOID SURGERY  10/2009    DR. MARRERO    IR BIOPSY BONE MARROW  07/24/2023    JOINT REPLACEMENT      shoulder replacement    KNEE SURGERY      MT SURG TX ANAL FISTULA INTERSPHINCTERIC N/A 6/7/2024    Procedure: ANAL FISTULOTOMY;  Surgeon: JOSE ALFREDO Ann MD;  [facility];  Service: Colorectal    SHOULDER SURGERY  06/18/2015    JOINT REPLACEMENT; DR. MONTGOMERY    SPINE SURGERY  12/2020    spine fusion       Meds/Allergies       Current Outpatient Medications:     allopurinol (ZYLOPRIM) 100 mg tablet, Take 1 tablet (100 mg total) by mouth daily (Patient taking differently: Take 100 mg by mouth daily As needed), Disp: 90 tablet, Rfl: 1    amLODIPine (NORVASC) 5 mg tablet, Take 1 tablet (5 mg total) by mouth daily, Disp: 90 tablet, Rfl: 2    anagrelide (AGRYLIN) 0.5 MG capsule, Take 0.5 mg by mouth 2 (two) times a day, Disp: , Rfl:     carvedilol (COREG) 25 mg tablet, Take 1 tablet (25 mg total) by mouth 2 (two) times a day with meals, Disp: 180 tablet, Rfl: 1    cholecalciferol (VITAMIN D3) 1,000 units tablet, Take 2,000 Units by mouth 2 (two) times a week, Disp: , Rfl:     colchicine (COLCRYS) 0.6 mg tablet, Take 1 tablet (0.6 mg total) by mouth daily (Patient not taking: Reported on 5/9/2024), Disp: 30 tablet, Rfl: 1    rosuvastatin (CRESTOR) 40 MG tablet, Take 1 tablet (40 mg total) by mouth daily at bedtime, Disp: 90 tablet, Rfl: 1    Thiamine HCl (VITAMIN B1 PO), Take 1 tablet by mouth in the morning, Disp: , Rfl:   Allergies   Allergen Reactions    Other      Levasy trees    Hydroxyurea Rash       Social History   Social History     Substance and Sexual Activity   Drug Use No     Social History     Tobacco Use   Smoking Status Former    Types: Cigars   Smokeless Tobacco Never   Tobacco Comments    one cigar once  "in awhile         Family History   Problem Relation Age of Onset    Other Mother         BRAIN TUMOR    No Known Problems Father     Vascular Disease Brother     Cancer Brother     Dementia Brother     Heart disease Brother     COPD Brother     Prostate cancer Brother     Substance Abuse Brother     Drug abuse Brother          Review of Systems    Objective   Current Vitals:  Vitals:    07/22/24 1252   BP: 108/62   Weight: 83.5 kg (184 lb)   Height: 5' 3\" (1.6 m)         Physical Exam          "

## 2024-07-17 NOTE — TELEPHONE ENCOUNTER
It does state in his visit note on 03/26/2024 that he was taking it 2 tablets of the 10 mg twice a day but didn't get a refill at that time of the visit.      Please advise if this is ok to switch to 40 mg once a day for him or take the 2 10 mg tablets twice a day.

## 2024-07-17 NOTE — TELEPHONE ENCOUNTER
Patient called the RX Refill Line. Message is being forwarded to the office.     Patient is requesting a refill for rosuvastatin. Patient stated when he was in the hospital in March they increased him to 40mg daily. Patient has been taking 40mg daily since then and said his cholesterol has improved. Please review and send a script to patients pharmacy for 40mg tablets if appropriate. Patient is almost out of the medication    Please contact patient at [phone]

## 2024-07-18 RX ORDER — ROSUVASTATIN CALCIUM 40 MG/1
40 TABLET, COATED ORAL
Qty: 90 TABLET | Refills: 1 | Status: SHIPPED | OUTPATIENT
Start: 2024-07-18

## 2024-07-22 ENCOUNTER — OFFICE VISIT (OUTPATIENT)
Age: 79
End: 2024-07-22

## 2024-07-22 VITALS
SYSTOLIC BLOOD PRESSURE: 108 MMHG | BODY MASS INDEX: 32.6 KG/M2 | HEIGHT: 63 IN | DIASTOLIC BLOOD PRESSURE: 62 MMHG | WEIGHT: 184 LBS

## 2024-07-22 DIAGNOSIS — K64.0 GRADE I HEMORRHOIDS: Primary | ICD-10-CM

## 2024-07-22 DIAGNOSIS — K60.3 ANAL FISTULA: ICD-10-CM

## 2024-07-22 PROCEDURE — 99024 POSTOP FOLLOW-UP VISIT: CPT | Performed by: COLON & RECTAL SURGERY

## 2024-07-22 NOTE — ASSESSMENT & PLAN NOTE
Tiny skin bridge in the right posterior perianal area within 1.5 cm of the anal verge. Probed through and 3 with a tiny probe.  I recommend this be unroofed at a separate visit with cautery available.

## 2024-08-09 ENCOUNTER — HOSPITAL ENCOUNTER (OUTPATIENT)
Dept: NON INVASIVE DIAGNOSTICS | Facility: HOSPITAL | Age: 79
Discharge: HOME | End: 2024-08-09
Attending: INTERNAL MEDICINE
Payer: COMMERCIAL

## 2024-08-09 DIAGNOSIS — I73.9 CLAUDICATION (HCC): ICD-10-CM

## 2024-08-09 PROCEDURE — 93925 LOWER EXTREMITY STUDY: CPT | Performed by: SURGERY

## 2024-08-09 PROCEDURE — 93922 UPR/L XTREMITY ART 2 LEVELS: CPT | Performed by: SURGERY

## 2024-08-09 PROCEDURE — 93923 UPR/LXTR ART STDY 3+ LVLS: CPT

## 2024-08-09 PROCEDURE — 93925 LOWER EXTREMITY STUDY: CPT

## 2024-08-12 DIAGNOSIS — I73.9 PERIPHERAL ARTERIAL DISEASE (HCC): ICD-10-CM

## 2024-08-12 DIAGNOSIS — I73.9 CLAUDICATION (HCC): Primary | ICD-10-CM

## 2024-08-13 LAB
ALBUMIN SERPL-MCNC: 4.2 G/DL (ref 3.7–4.7)
ALP SERPL-CCNC: 44 IU/L (ref 44–121)
ALT SERPL-CCNC: 15 IU/L (ref 0–44)
AST SERPL-CCNC: 15 IU/L (ref 0–40)
BILIRUB SERPL-MCNC: 0.4 MG/DL (ref 0–1.2)
BUN SERPL-MCNC: 27 MG/DL (ref 8–27)
BUN/CREAT SERPL: 20 (ref 10–24)
CALCIUM SERPL-MCNC: 9 MG/DL (ref 8.6–10.2)
CHLORIDE SERPL-SCNC: 109 MMOL/L (ref 96–106)
CO2 SERPL-SCNC: 20 MMOL/L (ref 20–29)
CREAT SERPL-MCNC: 1.38 MG/DL (ref 0.76–1.27)
EGFR: 51 ML/MIN/1.73
GLOBULIN SER-MCNC: 2 G/DL (ref 1.5–4.5)
GLUCOSE SERPL-MCNC: 111 MG/DL (ref 70–99)
HBA1C MFR BLD: 6.9 % (ref 4.8–5.6)
POTASSIUM SERPL-SCNC: 4.6 MMOL/L (ref 3.5–5.2)
PROT SERPL-MCNC: 6.2 G/DL (ref 6–8.5)
SODIUM SERPL-SCNC: 142 MMOL/L (ref 134–144)

## 2024-08-15 ENCOUNTER — TELEPHONE (OUTPATIENT)
Age: 79
End: 2024-08-15

## 2024-08-23 DIAGNOSIS — I10 BENIGN ESSENTIAL HTN: ICD-10-CM

## 2024-08-24 RX ORDER — AMLODIPINE BESYLATE 5 MG/1
5 TABLET ORAL DAILY
Qty: 90 TABLET | Refills: 0 | Status: SHIPPED | OUTPATIENT
Start: 2024-08-24

## 2024-08-30 NOTE — TELEPHONE ENCOUNTER
As a final attempt, a third outreach has been made via fax  Please see Contacts section for details  This encounter will be closed and completed by end of day  Should we receive the requested information because of previous outreach attempts, the requested patient's chart will be updated appropriately       Thank you  Evelyn Zambrano Patient/Caregiver provided printed discharge information.

## 2024-09-04 ENCOUNTER — TELEPHONE (OUTPATIENT)
Age: 79
End: 2024-09-04

## 2024-09-04 NOTE — TELEPHONE ENCOUNTER
Caller: Adarsh    Doctor: Dr. Gottlieb    Reason for call: patient was recently in ED and was told by providers that he should get a device implant. Requesting a call back from Dr. Gottlieb to go over the risk and reasoning.     Call back#: [phone]

## 2024-09-05 ENCOUNTER — OFFICE VISIT (OUTPATIENT)
Dept: INTERNAL MEDICINE CLINIC | Age: 79
End: 2024-09-05
Payer: COMMERCIAL

## 2024-09-05 VITALS
HEIGHT: 63 IN | TEMPERATURE: 98.1 F | WEIGHT: 182.6 LBS | BODY MASS INDEX: 32.36 KG/M2 | OXYGEN SATURATION: 98 % | HEART RATE: 61 BPM | SYSTOLIC BLOOD PRESSURE: 120 MMHG | DIASTOLIC BLOOD PRESSURE: 86 MMHG

## 2024-09-05 DIAGNOSIS — E11.22 TYPE 2 DIABETES MELLITUS WITH STAGE 3A CHRONIC KIDNEY DISEASE, WITHOUT LONG-TERM CURRENT USE OF INSULIN (HCC): ICD-10-CM

## 2024-09-05 DIAGNOSIS — E78.00 HYPERCHOLESTEROLEMIA: ICD-10-CM

## 2024-09-05 DIAGNOSIS — N18.31 TYPE 2 DIABETES MELLITUS WITH STAGE 3A CHRONIC KIDNEY DISEASE, WITHOUT LONG-TERM CURRENT USE OF INSULIN (HCC): ICD-10-CM

## 2024-09-05 DIAGNOSIS — J42 CHRONIC BRONCHITIS, UNSPECIFIED CHRONIC BRONCHITIS TYPE (HCC): Primary | ICD-10-CM

## 2024-09-05 DIAGNOSIS — I73.9 PAD (PERIPHERAL ARTERY DISEASE) (HCC): ICD-10-CM

## 2024-09-05 DIAGNOSIS — I73.9 CLAUDICATION (HCC): ICD-10-CM

## 2024-09-05 DIAGNOSIS — D47.1 CHRONIC MYELOPROLIFERATIVE DISEASE (HCC): ICD-10-CM

## 2024-09-05 DIAGNOSIS — D47.3 ESSENTIAL THROMBOCYTHEMIA (HCC): ICD-10-CM

## 2024-09-05 DIAGNOSIS — Z86.73 PERSONAL HISTORY OF TIA (TRANSIENT ISCHEMIC ATTACK): ICD-10-CM

## 2024-09-05 DIAGNOSIS — N18.31 STAGE 3A CHRONIC KIDNEY DISEASE (HCC): ICD-10-CM

## 2024-09-05 DIAGNOSIS — I65.23 CAROTID ARTERY STENOSIS, ASYMPTOMATIC, BILATERAL: ICD-10-CM

## 2024-09-05 DIAGNOSIS — I25.10 CORONARY ARTERY DISEASE INVOLVING NATIVE CORONARY ARTERY OF NATIVE HEART WITHOUT ANGINA PECTORIS: ICD-10-CM

## 2024-09-05 PROCEDURE — 99495 TRANSJ CARE MGMT MOD F2F 14D: CPT | Performed by: INTERNAL MEDICINE

## 2024-09-05 RX ORDER — MULTIVITAMIN
1 TABLET ORAL DAILY
COMMUNITY

## 2024-09-05 RX ORDER — ROSUVASTATIN CALCIUM 5 MG/1
5 TABLET, COATED ORAL
COMMUNITY

## 2024-09-05 NOTE — ASSESSMENT & PLAN NOTE
Lab Results   Component Value Date    EGFR 49 (L) 08/27/2024    EGFR 51 (L) 08/12/2024    EGFR 59 (L) 06/18/2024    CREATININE 1.43 (H) 08/27/2024    CREATININE 1.38 (H) 08/12/2024    CREATININE 1.23 06/18/2024   Renal function stable.  Continue with adequate oral hydration and to avoid nephrotoxic med including over-the-counter NSAIDs and IV contrast if possible

## 2024-09-05 NOTE — ASSESSMENT & PLAN NOTE
Patient was admitted to [facility] with TIA symptoms.  No acute stroke on MRI/MRA of brain and neck.  Will be followed by neurology as outpatient.  Continue with present regimen.  Patient will also need loop recorder and will be seeing his cardiologist

## 2024-09-05 NOTE — TELEPHONE ENCOUNTER
Spoke with pt, advised pt Dr. Gottlieb is currently out of the office. Pt will wait for Dr. Gottlieb to return on Monday. He would like to speak with him directly.

## 2024-09-05 NOTE — PROGRESS NOTES
Diabetic Foot Exam    Patient's shoes and socks removed.    Right Foot/Ankle   Right Foot Inspection  Skin Exam: skin normal and skin intact. No dry skin, no warmth, no callus, no erythema, no maceration, no abnormal color, no pre-ulcer, no ulcer and no callus.     Sensory   Monofilament testing: intact    Vascular  The right DP pulse is 1+. The right PT pulse is 1+.     Left Foot/Ankle  Left Foot Inspection  Skin Exam: skin normal and skin intact. No dry skin, no warmth, no erythema, no maceration, normal color, no pre-ulcer, no ulcer and no callus.     Sensory   Monofilament testing: intact    Vascular  The left DP pulse is 1+. The left PT pulse is 1+.     Assign Risk Category  No deformity present  No loss of protective sensation  Weak pulses  Risk: 1

## 2024-09-05 NOTE — ASSESSMENT & PLAN NOTE
Lab Results   Component Value Date    HGBA1C 6.9 (H) 08/12/2024   Presently on just strict diabetic diet.  Will repeat hemoglobin A1c in 3 months.  And will manage accordingly.  Continue with diabetic diet

## 2024-09-05 NOTE — ASSESSMENT & PLAN NOTE
Patient with recent arterial duplex of lower extremity with significant bilateral peripheral arterial disease and was evaluated by a vascular surgeon and management as per vascular surgery.  Continue with antiplatelet and statins

## 2024-09-05 NOTE — PROGRESS NOTES
Assessment/Plan:    Carotid artery stenosis, asymptomatic, bilateral  Continue with present regimen off antiplatelet and statin    Coronary artery disease involving native coronary artery of native heart without angina pectoris  Continue with present regimen.  And also managed by cardiologist    PAD (peripheral artery disease) ([facility])  Patient with recent arterial duplex of lower extremity with significant bilateral peripheral arterial disease and was evaluated by a vascular surgeon and management as per vascular surgery.  Continue with antiplatelet and statins    Chronic bronchitis, unspecified chronic bronchitis type ([facility])  Stable.  No shortness of breath.  Continue with present regimen    Type 2 diabetes mellitus with stage 3a chronic kidney disease, without long-term current use of insulin ([facility])    Lab Results   Component Value Date    HGBA1C 6.9 (H) 08/12/2024   Presently on just strict diabetic diet.  Will repeat hemoglobin A1c in 3 months.  And will manage accordingly.  Continue with diabetic diet    CKD (chronic kidney disease) stage 3, GFR 30-59 ml/min ([facility])  Lab Results   Component Value Date    EGFR 49 (L) 08/27/2024    EGFR 51 (L) 08/12/2024    EGFR 59 (L) 06/18/2024    CREATININE 1.43 (H) 08/27/2024    CREATININE 1.38 (H) 08/12/2024    CREATININE 1.23 06/18/2024   Renal function stable.  Continue with adequate oral hydration and to avoid nephrotoxic med including over-the-counter NSAIDs and IV contrast if possible    Essential thrombocythemia ([facility])  Stable.  Continue with present regimen also managed by hematology    Chronic myeloproliferative disease ([facility])  Managed by hematologist    Hypercholesterolemia  Presently on Crestor 5 mg daily.  Dose was lower during recent hospitalization.  Will check lipid profile in 3-month    Personal history of TIA (transient ischemic attack)  Patient was admitted to [facility] with TIA symptoms.  No acute stroke on MRI/MRA of brain and neck.  Will be followed  by neurology as outpatient.  Continue with present regimen.  Patient will also need loop recorder and will be seeing his cardiologist       Diagnoses and all orders for this visit:    Chronic bronchitis, unspecified chronic bronchitis type (HCC)    Type 2 diabetes mellitus with stage 3a chronic kidney disease, without long-term current use of insulin (HCC)  -     Ambulatory Referral to Ophthalmology; Future  -     Hemoglobin A1C; Future    Stage 3a chronic kidney disease (HCC)  -     Comprehensive metabolic panel; Future  -     CBC and Platelet; Future    Essential thrombocythemia (HCC)    Chronic myeloproliferative disease ([facility])    Hypercholesterolemia  -     Lipid Panel with Direct LDL reflex; Future    Claudication ([facility])    PAD (peripheral artery disease) ([facility])    Carotid artery stenosis, asymptomatic, bilateral    Coronary artery disease involving native coronary artery of native heart without angina pectoris    Personal history of TIA (transient ischemic attack)    Other orders  -     rosuvastatin (CRESTOR) 5 mg tablet; Take 5 mg by mouth daily at bedtime  -     Multiple Vitamin (multivitamin) tablet; Take 1 tablet by mouth daily          Subjective:          Patient ID: Adarsh Sheppard is a 79 y.o. male.    TCM Call       Date and time call was made  8/30/2024 10:48 AM    Hospital care reviewed  Records reviewed    Patient was hospitialized at  [facility]    Date of Admission  08/27/24    Date of discharge  08/29/24    Diagnosis  TIA    Disposition  Home    Were the patients medications reviewed and updated  Yes    Current Symptoms  None          TCM Call       Post hospital issues  None    Should patient be enrolled in anticoag monitoring?  Yes    Scheduled for follow up?  Yes    Did you obtain your prescribed medications  Yes    Do you need help managing your prescriptions or medications  No    Is transportation to your appointment needed  No    I have advised the patient to call PCP with any  new or worsening symptoms  Alon Omid CMA    Living Arrangements  Spouse or Significiant other    Support System  Spouse    The type of support provided  Emotional; Physical    Do you have social support  Yes, quite a bit    Are you recieving any outpatient services  No    Are you recieving home care services  No    Are you using any community resources  No    Current waiver services  No    Have you fallen in the last 12 months  No    Interperter language line needed  No    Counseling  Patient    Counseling topics  Activities of daily living; Importance of RX compliance; instructions for management            This is a follow-up after hospitalization to [facility].  With TIA-like symptoms.  Patient underwent MRA of neck/MRI/MRI of brain.  No acute infarction noted but old evidence of previous infarction in the right parietal area noted.  Patient was suggested to go for loop recorder as outpatient.  Dose of Crestor was decreased to 5 mg daily.  Will need follow-up as outpatient with neurology        The following portions of the patient's history were reviewed and updated as appropriate: allergies, current medications, past family history, past medical history, past social history, past surgical history, and problem list.    Review of Systems   Constitutional:  Negative for fatigue and fever.   HENT:  Negative for congestion, ear discharge, ear pain, postnasal drip, sinus pressure, sore throat, tinnitus and trouble swallowing.    Eyes:  Negative for discharge, itching and visual disturbance.   Respiratory:  Negative for cough and shortness of breath.    Cardiovascular:  Negative for chest pain and palpitations.   Gastrointestinal:  Negative for abdominal pain, diarrhea, nausea and vomiting.   Endocrine: Negative for cold intolerance and polyuria.   Genitourinary:  Negative for difficulty urinating, dysuria and urgency.   Musculoskeletal:  Positive for arthralgias. Negative for neck pain.   Skin:   Negative for rash.   Allergic/Immunologic: Negative for environmental allergies.   Neurological:  Negative for dizziness, weakness and headaches.   Psychiatric/Behavioral:  Negative for agitation and behavioral problems. The patient is not nervous/anxious.          Past Medical History:   Diagnosis Date    Actinic keratosis     RESOLVED: [id number]    Adolescent idiopathic scoliosis of thoracolumbar region 04/12/2018    Allergic     Anxiety     Arthritis     Basal cell carcinoma of back     RESOLVED: [id number]    Basal cell carcinoma of skin of upper extremity, including shoulder     BCC (basal cell carcinoma), trunk     BCE (basal cell epithelioma), trunk     RESOLVED: [id number]    Benign neoplasm of skin of face     Chronic kidney disease     COPD (chronic obstructive pulmonary disease) ([facility])     Coronary artery disease involving native coronary artery of native heart without angina pectoris 10/16/2018    Depression 10/06/2015    Gastroesophageal reflux disease without esophagitis 04/21/2017    GERD (gastroesophageal reflux disease)     Hypertension     Myocardial infarction ([facility])     Neoplasm of uncertain behavior of skin     Nonmelanoma skin cancer     LAST ASSESSED: 12MAY2015    Pneumonia     Scoliosis     Seborrheic keratosis     RESOLVED: [id number]    Stroke ([facility])          Current Outpatient Medications:     allopurinol (ZYLOPRIM) 100 mg tablet, Take 1 tablet (100 mg total) by mouth daily (Patient taking differently: Take 100 mg by mouth daily As needed), Disp: 90 tablet, Rfl: 1    amLODIPine (NORVASC) 5 mg tablet, Take 1 tablet (5 mg total) by mouth daily, Disp: 90 tablet, Rfl: 0    anagrelide (AGRYLIN) 0.5 MG capsule, Take 0.5 mg by mouth 2 (two) times a day, Disp: , Rfl:     carvedilol (COREG) 25 mg tablet, Take 1 tablet (25 mg total) by mouth 2 (two) times a day with meals, Disp: 180 tablet, Rfl: 1    colchicine (COLCRYS) 0.6 mg tablet, Take 1 tablet (0.6 mg total) by mouth daily (Patient taking  "differently: Take 0.6 mg by mouth daily as needed (gout)), Disp: 30 tablet, Rfl: 1    Multiple Vitamin (multivitamin) tablet, Take 1 tablet by mouth daily, Disp: , Rfl:     rosuvastatin (CRESTOR) 5 mg tablet, Take 5 mg by mouth daily at bedtime, Disp: , Rfl:     cholecalciferol (VITAMIN D3) 1,000 units tablet, Take 2,000 Units by mouth 2 (two) times a week (Patient not taking: Reported on 9/5/2024), Disp: , Rfl:     rosuvastatin (CRESTOR) 40 MG tablet, Take 1 tablet (40 mg total) by mouth daily at bedtime (Patient not taking: Reported on 9/5/2024), Disp: 90 tablet, Rfl: 1    Thiamine HCl (VITAMIN B1 PO), Take 1 tablet by mouth in the morning (Patient not taking: Reported on 9/5/2024), Disp: , Rfl:     Allergies   Allergen Reactions    Other      [place] trees    Hydroxyurea Rash       Social History   Past Surgical History:   Procedure Laterality Date    ANAL FISTULOTOMY  06/13/2011    DR. SANCHEZ    BASAL CELL CARCINOMA EXCISION  11/2023    back    CORONARY ARTERY BYPASS GRAFT  05/17/2005    TRIPLE; DR. PETTY SURGEON [facility]     HEMORRHOID SURGERY  10/2009    DR. MARRERO    IR BIOPSY BONE MARROW  07/24/2023    JOINT REPLACEMENT      shoulder replacement    KNEE SURGERY      MA SURG TX ANAL FISTULA INTERSPHINCTERIC N/A 6/7/2024    Procedure: ANAL FISTULOTOMY;  Surgeon: JOSE ALFREDO Ann MD;  Location[facility] MAIN OR;  Service: Colorectal    SHOULDER SURGERY  06/18/2015    JOINT REPLACEMENT; DR. MONTGOMERY    SPINE SURGERY  12/2020    spine fusion     Family History   Problem Relation Age of Onset    Other Mother         BRAIN TUMOR    No Known Problems Father     Vascular Disease Brother     Cancer Brother     Dementia Brother     Heart disease Brother     COPD Brother     Prostate cancer Brother     Substance Abuse Brother     Drug abuse Brother        Objective:  /86 (BP Location: Left arm, Patient Position: Sitting, Cuff Size: Adult)   Pulse 61   Temp 98.1 °F (36.7 °C) (Temporal)   Ht 5' 3\" (1.6 " m)   Wt 82.8 kg (182 lb 9.6 oz)   SpO2 98% Comment: ra  BMI 32.35 kg/m²   Body mass index is 32.35 kg/m².     Physical Exam  Constitutional:       General: He is not in acute distress.     Appearance: He is well-developed.   HENT:      Head: Normocephalic.      Right Ear: External ear normal. There is no impacted cerumen.      Left Ear: External ear normal. There is no impacted cerumen.      Nose: No rhinorrhea.      Mouth/Throat:      Pharynx: No posterior oropharyngeal erythema.   Eyes:      General: No scleral icterus.     Pupils: Pupils are equal, round, and reactive to light.   Neck:      Thyroid: No thyromegaly.      Trachea: No tracheal deviation.   Cardiovascular:      Rate and Rhythm: Normal rate and regular rhythm.      Heart sounds: Normal heart sounds. No murmur heard.     Comments: Bilateral posterior tibial and dorsalis pedis pulses are 1+.  Pulmonary:      Effort: Pulmonary effort is normal. No respiratory distress.      Breath sounds: Normal breath sounds.   Chest:      Chest wall: No tenderness.   Abdominal:      General: Bowel sounds are normal.      Palpations: Abdomen is soft. There is no mass.      Tenderness: There is no abdominal tenderness.   Musculoskeletal:         General: Normal range of motion.      Cervical back: Normal range of motion and neck supple. No rigidity.      Right lower leg: No edema.      Left lower leg: No edema.   Lymphadenopathy:      Cervical: No cervical adenopathy.   Skin:     General: Skin is warm.      Findings: No erythema or rash.   Neurological:      Mental Status: He is alert and oriented to person, place, and time.      Cranial Nerves: No cranial nerve deficit.      Motor: No weakness.      Gait: Gait normal.   Psychiatric:         Mood and Affect: Mood normal.         Behavior: Behavior normal.

## 2024-09-05 NOTE — ASSESSMENT & PLAN NOTE
Presently on Crestor 5 mg daily.  Dose was lower during recent hospitalization.  Will check lipid profile in 3-month

## 2024-09-10 ENCOUNTER — TELEPHONE (OUTPATIENT)
Dept: CARDIOLOGY CLINIC | Facility: CLINIC | Age: 79
End: 2024-09-10

## 2024-09-10 ENCOUNTER — PREP FOR PROCEDURE (OUTPATIENT)
Dept: CARDIOLOGY CLINIC | Facility: CLINIC | Age: 79
End: 2024-09-10

## 2024-09-10 DIAGNOSIS — G45.9 TRANSIENT ISCHEMIC ATTACK: Primary | ICD-10-CM

## 2024-09-10 DIAGNOSIS — I25.10 CORONARY ARTERY DISEASE INVOLVING NATIVE CORONARY ARTERY OF NATIVE HEART WITHOUT ANGINA PECTORIS: ICD-10-CM

## 2024-09-10 DIAGNOSIS — I10 BENIGN ESSENTIAL HTN: ICD-10-CM

## 2024-09-10 DIAGNOSIS — I65.23 CAROTID ARTERY STENOSIS, ASYMPTOMATIC, BILATERAL: ICD-10-CM

## 2024-09-10 NOTE — TELEPHONE ENCOUNTER
Patient is scheduled for LOOP Recorder Implant on 11/6/24 at  [facility] .     Patient aware of all general instructions.    Instructions sent to patient (MAILED 9/10/24)    Medication holds:   NO MED HOLDS     Blood work to be done BETWEEN 10/6/204 & 10/30/24   CMP / CBC (FASTING 8 HOURS)    Insurance: [employer]   PT. HAD A ZIO FOR 12 DAYS.      Please obtain auth.         Thank you,  Candace Ordonez

## 2024-09-10 NOTE — TELEPHONE ENCOUNTER
----- Message from Jake Gottlieb DO sent at 9/9/2024 12:34 PM EDT -----  Can you please set this patient up for a loop recorder.  Diagnosis TIA/CVA    thanks

## 2024-09-10 NOTE — LETTER
CARDIAC LOOP RECORDER IMPLANT/EXPLANT INSTRUCTIONS       9/10/2024        Adarsh Sheppard          : 1945        MRN: [id number]   [address]   [address]  [facility] [id number]         Procedure Name: CARDIAC LOOP RECORDER IMPLANT    Procedure date: 2024    Location: [facility]  Address: [address] [place] [id number]        You may have breakfast the morning of the procedure.    Please take your morning medication as prescribed.    The hospital will contact you one day prior to your procedure date between 4PM - 6PM to give you the time and place to report. If you do not hear from [facility] by 6PM the evening prior to your procedure, please call  [phone] ([place]) or [phone] ([place]).     Please notify us if you have been admitted to the hospital within the past 30 days.    You may drive yourself to and from the hospital for the procedure.    Make a list of your medications, including doses, to bring to the hospital. Please karrie which medications you took the morning of the procedure.    Leave all valuables at home.      If you have any further questions, I can be reached at [phone].                      Special Instructions    Medication hold:   NO MED HOLDS    Blood work to be done BETWEEN 10/6/24 & 10/30/24   (FASTING)        Thank you,   Candace Ordonez  Surgery Coordinator  [facility]   [address] [place] [id number]  Teams: [phone]

## 2024-09-11 ENCOUNTER — OFFICE VISIT (OUTPATIENT)
Age: 79
End: 2024-09-11
Payer: COMMERCIAL

## 2024-09-11 VITALS — HEIGHT: 63 IN | BODY MASS INDEX: 32.07 KG/M2 | WEIGHT: 181 LBS

## 2024-09-11 DIAGNOSIS — K60.3 ANAL FISTULA: Primary | ICD-10-CM

## 2024-09-11 PROCEDURE — 99212 OFFICE O/P EST SF 10 MIN: CPT | Performed by: COLON & RECTAL SURGERY

## 2024-09-11 NOTE — PROGRESS NOTES
Anal Procedures    Performed by: JOSE ALFREDO Ann MD  Authorized by: JOSE ALFREDO Ann MD    Universal Protocol:     Verbal consent obtained?: Yes      Consent given by:  Patient  A time out verifies correct patient, procedure, equipment, support staff and site/side marked as required:   Procedure Type: fistulotomy    Fissurectomy/Fistulotomy:   Surgical treatment, subcutaneous    Fissure Location: posterior    Additiional Procedure Intervention Details:  A right posterior superficial fistulotomy was done between the anal verge and perianal skin approximately 1 cm away.  This was subcutaneous at its deepest level and very near to the skin itself.  This was done after Betadine prep and with cautery.  The area was anesthetized with quarter percent Marcaine before the fistulotomy was performed.  No bleeding was encountered.  Gauze was applied.    Colon and Rectal Surgery   Adarsh Sheppard 79 y.o. male MRN [id number]  Encounter: [id number]  09/11/24 3:19 PM            Assessment: Adarsh Sheppard is a 79 y.o. male who had an anal fistula.      Plan:   Anal fistula  Superficial fistulotomy was done in the office.  This was in the right postanal position.  He tolerated it well.      Subjective     HPI    Adarsh Sheppard is a 79 y.o. male who presents for a follow up evaluation for anal fistula and hemorrhoids. He was last seen in the office on 7/22/24.    He continues to have discomfort and itching when he has hemorrhoid flares.   He denies constipation and diarrhea. He has daily bowel movements.     Last colonoscopy was performed on 10/13/2021 by Dr. HARI Mustafa with a PRN recall.    Last CBC 8/27/24 was within normal limits except for PLT = 669(H), RDW = 16.2(H).  Last CMP 8/27/24 was within normal limits except for GLUC = 184 (H), CREA = 1.43(H), eGFRcr = 49(L).    Historical Information   Past Medical History:   Diagnosis Date   • Actinic keratosis     RESOLVED: 16JUL2015   • Adolescent idiopathic scoliosis of  thoracolumbar region 04/12/2018   • Allergic    • Anxiety    • Arthritis    • Basal cell carcinoma of back     RESOLVED: [id number]   • Basal cell carcinoma of skin of upper extremity, including shoulder    • BCC (basal cell carcinoma), trunk    • BCE (basal cell epithelioma), trunk     RESOLVED: [id number]   • Benign neoplasm of skin of face    • Chronic kidney disease    • COPD (chronic obstructive pulmonary disease) (HCC)    • Coronary artery disease involving native coronary artery of native heart without angina pectoris 10/16/2018   • Depression 10/06/2015   • Gastroesophageal reflux disease without esophagitis 04/21/2017   • GERD (gastroesophageal reflux disease)    • Hypertension    • Myocardial infarction (HCC)    • Neoplasm of uncertain behavior of skin    • Nonmelanoma skin cancer     LAST ASSESSED: 12MAY2015   • Pneumonia    • Scoliosis    • Seborrheic keratosis     RESOLVED: 04SEP2015   • Stroke (HCC)      Past Surgical History:   Procedure Laterality Date   • ANAL FISTULOTOMY  06/13/2011    DR. SANCHEZ   • BASAL CELL CARCINOMA EXCISION  11/2023    back   • CORONARY ARTERY BYPASS GRAFT  05/17/2005    TRIPLE; DR. PETTY SURGEON [facility]    • HEMORRHOID SURGERY  10/2009    DR. MARRERO   • IR BIOPSY BONE MARROW  07/24/2023   • JOINT REPLACEMENT      shoulder replacement   • KNEE SURGERY     • IN SURG TX ANAL FISTULA INTERSPHINCTERIC N/A 6/7/2024    Procedure: ANAL FISTULOTOMY;  Surgeon: JOSE ALFREDO Ann MD;  [facility];  Service: Colorectal   • SHOULDER SURGERY  06/18/2015    JOINT REPLACEMENT; DR. MONTGOMERY   • SPINE SURGERY  12/2020    spine fusion       Meds/Allergies       Current Outpatient Medications:   •  amLODIPine (NORVASC) 5 mg tablet, Take 1 tablet (5 mg total) by mouth daily, Disp: 90 tablet, Rfl: 0  •  anagrelide (AGRYLIN) 0.5 MG capsule, Take 0.5 mg by mouth 2 (two) times a day, Disp: , Rfl:   •  carvedilol (COREG) 25 mg tablet, Take 1 tablet (25 mg total) by mouth 2  "(two) times a day with meals, Disp: 180 tablet, Rfl: 1  •  colchicine (COLCRYS) 0.6 mg tablet, Take 1 tablet (0.6 mg total) by mouth daily (Patient taking differently: Take 0.6 mg by mouth daily as needed (gout)), Disp: 30 tablet, Rfl: 1  •  Multiple Vitamin (multivitamin) tablet, Take 1 tablet by mouth daily, Disp: , Rfl:   •  rosuvastatin (CRESTOR) 5 mg tablet, Take 5 mg by mouth daily at bedtime, Disp: , Rfl:   •  allopurinol (ZYLOPRIM) 100 mg tablet, Take 1 tablet (100 mg total) by mouth daily (Patient taking differently: Take 100 mg by mouth daily As needed), Disp: 90 tablet, Rfl: 1  •  cholecalciferol (VITAMIN D3) 1,000 units tablet, Take 2,000 Units by mouth 2 (two) times a week (Patient not taking: Reported on 9/5/2024), Disp: , Rfl:   •  Thiamine HCl (VITAMIN B1 PO), Take 1 tablet by mouth in the morning (Patient not taking: Reported on 9/5/2024), Disp: , Rfl:   Allergies   Allergen Reactions   • Other      [place] trees   • Hydroxyurea Rash       Social History   Social History     Substance and Sexual Activity   Drug Use No     Social History     Tobacco Use   Smoking Status Light Smoker   • Types: Cigars   Smokeless Tobacco Never   Tobacco Comments    one cigar once in awhile         Family History   Problem Relation Age of Onset   • Other Mother         BRAIN TUMOR   • No Known Problems Father    • Vascular Disease Brother    • Cancer Brother    • Dementia Brother    • Heart disease Brother    • COPD Brother    • Prostate cancer Brother    • Substance Abuse Brother    • Drug abuse Brother          Review of Systems    Objective   Current Vitals:  Vitals:    09/11/24 1443   Weight: 82.1 kg (181 lb)   Height: 5' 3\" (1.6 m)         Physical Exam          "

## 2024-09-11 NOTE — ASSESSMENT & PLAN NOTE
Superficial fistulotomy was done in the office.  This was in the right postanal position.  He tolerated it well.

## 2024-09-29 NOTE — PROGRESS NOTES
Vascular Surgery New Patient Visit  Date: 09/30/24      Assessment:  Adarsh Sheppard is a 79 y.o. male with symptoms of claudication.  He does not have rest pain or nonhealing wounds.  His lifestyle is not limited by his symptoms.  Explained the role of surgical intervention in the setting of PAOD and the importance of lifestyle modifications and exercise program.  He is currently on appropriate medical therapy and I encouraged him to return to using the treadmill.      Plan:  -Continue exercising  -Continue ASA and Crestor for atherosclerotic disease  -Prescription for pletal 50mg bid provided today  -RTC 6 months w/ LEAD    Diagnoses and all orders for this visit:    Claudication (HCC)  -     Ambulatory Referral to Vascular Surgery  -     cilostazol (PLETAL) 50 mg tablet; Take 1 tablet (50 mg total) by mouth 2 (two) times a day  -     VAS ARTERIAL DUPLEX- LOWER LIMB BILATERAL; Future    Peripheral arterial disease (HCC)  -     Ambulatory Referral to Vascular Surgery           Subjective:     HPI:  Adarsh Sheppard is a 79 y.o. male with PMH of COPD (not on home O2), MI, stroke, and HTN. Echo from 8/2024 at [facility] showed EF 55-60%, no AS or significant valvular regurgitation.  He presents to clinic today to discuss symptoms of claudication.  He says for the last year or so he has noticed increasing symptoms of tightness in his thighs and calves when he walks.  He had been going to the gym multiple times a week but says that he has recently had to give up walking on the treadmill because of the symptoms.  Only expresses having a sensation of tightness, denies having pain.  Symptoms improve rapidly when he stops walking.  No rest pain or wounds.    He is currently taking ASA 81 and Crestor.    ABIs:  Date 8/9/24    Right 0.8, TP 51    Elevated velocities noted in the SFA (>75% stenosis) and tibials    Left 1.13, TP 72            Objective:    ROS:  All systems were reviewed and are negative except those mentioned in HPI  "and below.      Vitals: /60 (BP Location: Left arm, Patient Position: Sitting, Cuff Size: Large)   Pulse (!) 54   Resp 18   Ht 5' 3\" (1.6 m)   Wt 84.9 kg (187 lb 3.2 oz)   SpO2 96%   BMI 33.16 kg/m²      General: male appears stated age, no apparent distress, alert and oriented   HEENT: normocephalic, atraumatic   Cardiovascular: hemodynamically stable   Chest/Lungs: no increased work of breathing, chest rise equal bilaterally   Abdomen: Soft, ND, NT, no pulsatile masses   Extremities: Bilateral DP/PT pulses.  Qualitatively, pedal pulses on the right are slightly weaker than the left.   Skin: warm and dry   Neuro: no gross deficits      Medications:  Current Outpatient Medications   Medication Sig Dispense Refill    allopurinol (ZYLOPRIM) 100 mg tablet Take 1 tablet (100 mg total) by mouth daily (Patient taking differently: Take 100 mg by mouth daily As needed) 90 tablet 1    amLODIPine (NORVASC) 5 mg tablet Take 1 tablet (5 mg total) by mouth daily 90 tablet 0    anagrelide (AGRYLIN) 0.5 MG capsule Take 0.5 mg by mouth 2 (two) times a day      carvedilol (COREG) 25 mg tablet Take 1 tablet (25 mg total) by mouth 2 (two) times a day with meals 180 tablet 1    cilostazol (PLETAL) 50 mg tablet Take 1 tablet (50 mg total) by mouth 2 (two) times a day 60 tablet 11    Multiple Vitamin (multivitamin) tablet Take 1 tablet by mouth daily      rosuvastatin (CRESTOR) 5 mg tablet Take 5 mg by mouth daily at bedtime      cholecalciferol (VITAMIN D3) 1,000 units tablet Take 2,000 Units by mouth 2 (two) times a week (Patient not taking: Reported on 9/5/2024)      colchicine (COLCRYS) 0.6 mg tablet Take 1 tablet (0.6 mg total) by mouth daily (Patient not taking: Reported on 9/30/2024) 30 tablet 1    Thiamine HCl (VITAMIN B1 PO) Take 1 tablet by mouth in the morning (Patient not taking: Reported on 9/5/2024)       No current facility-administered medications for this visit.       Allergies:  Allergies   Allergen " Reactions    Other      [place] trees    Hydroxyurea Rash        PMH:  Past Medical History:   Diagnosis Date    Actinic keratosis     RESOLVED: [id number]    Adolescent idiopathic scoliosis of thoracolumbar region 04/12/2018    Allergic     Anxiety     Arthritis     Basal cell carcinoma of back     RESOLVED: [id number]    Basal cell carcinoma of skin of upper extremity, including shoulder     BCC (basal cell carcinoma), trunk     BCE (basal cell epithelioma), trunk     RESOLVED: [id number]    Benign neoplasm of skin of face     Chronic kidney disease     COPD (chronic obstructive pulmonary disease) (HCC)     Coronary artery disease involving native coronary artery of native heart without angina pectoris 10/16/2018    Depression 10/06/2015    Gastroesophageal reflux disease without esophagitis 04/21/2017    GERD (gastroesophageal reflux disease)     Hypertension     Myocardial infarction (HCC)     Neoplasm of uncertain behavior of skin     Nonmelanoma skin cancer     LAST ASSESSED: 12MAY2015    Pneumonia     Scoliosis     Seborrheic keratosis     RESOLVED: [id number]    Stroke (HCC)         PSH:  Past Surgical History:   Procedure Laterality Date    ANAL FISTULOTOMY  06/13/2011    DR. SANCHEZ    BASAL CELL CARCINOMA EXCISION  11/2023    back    CORONARY ARTERY BYPASS GRAFT  05/17/2005    TRIPLE; DR. PETTY SURGEON [facility]     HEMORRHOID SURGERY  10/2009    DR. MARRERO    IR BIOPSY BONE MARROW  07/24/2023    JOINT REPLACEMENT      shoulder replacement    KNEE SURGERY      [place] SURG TX ANAL FISTULA INTERSPHINCTERIC N/A 6/7/2024    Procedure: ANAL FISTULOTOMY;  Surgeon: JOSE ALFREDO Ann MD;  [facility];  Service: Colorectal    SHOULDER SURGERY  06/18/2015    JOINT REPLACEMENT; DR. MONTGOMERY    SPINE SURGERY  12/2020    spine fusion        FHx:  Family History   Problem Relation Age of Onset    Other Mother         BRAIN TUMOR    No Known Problems Father     Vascular Disease Brother     Cancer Brother      Dementia Brother     Heart disease Brother     COPD Brother     Prostate cancer Brother     Substance Abuse Brother     Drug abuse Brother         SHx:  Social History     Socioeconomic History    Marital status: /Civil Union     Spouse name: Not on file    Number of children: Not on file    Years of education: Not on file    Highest education level: Not on file   Occupational History    Not on file   Tobacco Use    Smoking status: Light Smoker     Types: Cigars    Smokeless tobacco: Never    Tobacco comments:     one cigar once in awhile   Vaping Use    Vaping status: Never Used   Substance and Sexual Activity    Alcohol use: Yes     Alcohol/week: 1.0 standard drink of alcohol     Types: 1 Glasses of wine per week     Comment: occasionally    Drug use: No    Sexual activity: Not Currently     Partners: Female     Birth control/protection: Male Sterilization   Other Topics Concern    Not on file   Social History Narrative    Not on file     Social Determinants of Health     Financial Resource Strain: Low Risk  (8/27/2024)    Received from [facility]    Overall Financial Resource Strain (CARDIA)     Difficulty of Paying Living Expenses: Not very hard   Food Insecurity: Food Insecurity Present (8/29/2024)    Hunger Vital Sign     Worried About Running Out of Food in the Last Year: Never true     Ran Out of Food in the Last Year: Sometimes true   Transportation Needs: No Transportation Needs (8/29/2024)    PRAPARE - Transportation     Lack of Transportation (Medical): No     Lack of Transportation (Non-Medical): No   Physical Activity: Not on file   Stress: Not on file   Social Connections: Not on file   Intimate Partner Violence: Not At Risk (8/27/2024)    Received from [facility]    Humiliation, Afraid, Rape, and Kick questionnaire     Fear of Current or Ex-Partner: No     Emotionally Abused: No     Physically Abused: No     Sexually Abused: No   Housing Stability: Low  Risk  (8/29/2024)    Housing Stability Vital Sign     Unable to Pay for Housing in the Last Year: No     Number of Times Moved in the Last Year: 0     Homeless in the Last Year: No

## 2024-09-30 ENCOUNTER — CONSULT (OUTPATIENT)
Dept: VASCULAR SURGERY | Facility: CLINIC | Age: 79
End: 2024-09-30
Payer: COMMERCIAL

## 2024-09-30 VITALS
DIASTOLIC BLOOD PRESSURE: 60 MMHG | HEART RATE: 54 BPM | HEIGHT: 63 IN | SYSTOLIC BLOOD PRESSURE: 140 MMHG | BODY MASS INDEX: 33.17 KG/M2 | OXYGEN SATURATION: 96 % | WEIGHT: 187.2 LBS | RESPIRATION RATE: 18 BRPM

## 2024-09-30 DIAGNOSIS — I73.9 PERIPHERAL ARTERIAL DISEASE (HCC): ICD-10-CM

## 2024-09-30 DIAGNOSIS — I73.9 CLAUDICATION (HCC): ICD-10-CM

## 2024-09-30 PROCEDURE — 99204 OFFICE O/P NEW MOD 45 MIN: CPT | Performed by: STUDENT IN AN ORGANIZED HEALTH CARE EDUCATION/TRAINING PROGRAM

## 2024-09-30 RX ORDER — CILOSTAZOL 50 MG/1
50 TABLET ORAL 2 TIMES DAILY
Qty: 60 TABLET | Refills: 11 | Status: SHIPPED | OUTPATIENT
Start: 2024-09-30 | End: 2025-09-25

## 2024-09-30 NOTE — PROGRESS NOTES
Ambulatory Visit  Name: Adarsh Sheppard      : 1945      MRN: [id number]  Encounter Provider: Kali Dawn MD  Encounter Date: 2024   Encounter department: [facility]    Assessment & Plan      History of Present Illness   {Disappearing Hyperlinks I Encounters * My Last Note * Since Last Visit * History :88295}    Pt is new to the practice here for Claudication had Duplex on 24 tightness when walking, some tingling on left toes, no pain on elevation.    {History obtained from (Optional):13517}  Review of Systems   Constitutional: Negative.    HENT: Negative.     Eyes: Negative.    Respiratory:  Positive for cough.    Cardiovascular: Negative.    Gastrointestinal: Negative.    Endocrine: Negative.    Genitourinary:  Positive for decreased urine volume.   Musculoskeletal:  Positive for back pain.   Skin: Negative.    Allergic/Immunologic: Negative.    Neurological: Negative.    Hematological: Negative.    Psychiatric/Behavioral: Negative.       {Select to Display PM (Optional):62609}      Objective   {Disappearing Hyperlinks   Review Vitals * Enter New Vitals * Results Review * Labs * Imaging * Cardiology * Procedures * Lung Cancer Screening * Surgical eConsent :06256}  There were no vitals taken for this visit.    Physical Exam  {Administrative / Billing Section (Optional):52370}

## 2024-10-01 ENCOUNTER — TELEPHONE (OUTPATIENT)
Age: 79
End: 2024-10-01

## 2024-10-01 NOTE — TELEPHONE ENCOUNTER
[facility] Pharmacist called. Wanted to inform PCP that carvedilol (COREG) 25 mg tablet , if ordered for heart failure, is contraindicated with cilostazol (PLETAL) 50 mg tablet.     Please inform PCP and return call to Pharmacist to clarify. Thank you

## 2024-10-02 NOTE — TELEPHONE ENCOUNTER
Patient was prescribed cilostazol at visit with vascular surgeon on 9/30/24      Please advise, thank you

## 2024-10-10 LAB
ALBUMIN SERPL-MCNC: 4.2 G/DL (ref 3.7–4.7)
ALP SERPL-CCNC: 48 IU/L (ref 44–121)
ALT SERPL-CCNC: 10 IU/L (ref 0–44)
AST SERPL-CCNC: 14 IU/L (ref 0–40)
BASOPHILS # BLD AUTO: 0 X10E3/UL (ref 0–0.2)
BASOPHILS NFR BLD AUTO: 0 %
BILIRUB SERPL-MCNC: 0.4 MG/DL (ref 0–1.2)
BUN SERPL-MCNC: 18 MG/DL (ref 8–27)
BUN/CREAT SERPL: 11 (ref 10–24)
CALCIUM SERPL-MCNC: 8.9 MG/DL (ref 8.6–10.2)
CHLORIDE SERPL-SCNC: 106 MMOL/L (ref 96–106)
CO2 SERPL-SCNC: 22 MMOL/L (ref 20–29)
CREAT SERPL-MCNC: 1.59 MG/DL (ref 0.76–1.27)
EGFR: 43 ML/MIN/1.73
EOSINOPHIL # BLD AUTO: 0.4 X10E3/UL (ref 0–0.4)
EOSINOPHIL NFR BLD AUTO: 4 %
ERYTHROCYTE [DISTWIDTH] IN BLOOD BY AUTOMATED COUNT: 14.9 % (ref 11.6–15.4)
GLOBULIN SER-MCNC: 1.7 G/DL (ref 1.5–4.5)
GLUCOSE SERPL-MCNC: 119 MG/DL (ref 70–99)
HCT VFR BLD AUTO: 38.3 % (ref 37.5–51)
HGB BLD-MCNC: 12.3 G/DL (ref 13–17.7)
IMM GRANULOCYTES # BLD: 0.1 X10E3/UL (ref 0–0.1)
IMM GRANULOCYTES NFR BLD: 1 %
LYMPHOCYTES # BLD AUTO: 1.8 X10E3/UL (ref 0.7–3.1)
LYMPHOCYTES NFR BLD AUTO: 19 %
MCH RBC QN AUTO: 29.3 PG (ref 26.6–33)
MCHC RBC AUTO-ENTMCNC: 32.1 G/DL (ref 31.5–35.7)
MCV RBC AUTO: 91 FL (ref 79–97)
MONOCYTES # BLD AUTO: 0.7 X10E3/UL (ref 0.1–0.9)
MONOCYTES NFR BLD AUTO: 8 %
NEUTROPHILS # BLD AUTO: 6.5 X10E3/UL (ref 1.4–7)
NEUTROPHILS NFR BLD AUTO: 68 %
PLATELET # BLD AUTO: 690 X10E3/UL (ref 150–450)
POTASSIUM SERPL-SCNC: 5 MMOL/L (ref 3.5–5.2)
PROT SERPL-MCNC: 5.9 G/DL (ref 6–8.5)
RBC # BLD AUTO: 4.2 X10E6/UL (ref 4.14–5.8)
SODIUM SERPL-SCNC: 140 MMOL/L (ref 134–144)
WBC # BLD AUTO: 9.4 X10E3/UL (ref 3.4–10.8)

## 2024-10-23 ENCOUNTER — TELEPHONE (OUTPATIENT)
Age: 79
End: 2024-10-23

## 2024-10-23 NOTE — TELEPHONE ENCOUNTER
Pt called to request a renewal for amoxicillin prescription. Please advise    [facility] - [place], [place] - [address]

## 2024-10-23 NOTE — TELEPHONE ENCOUNTER
We do not automatically renew antibiotics. If patient is sick, they need an appointment.     Thank you!    Left message on machine to make appointment if he I sick

## 2024-10-31 ENCOUNTER — TELEPHONE (OUTPATIENT)
Dept: OTHER | Facility: OTHER | Age: 79
End: 2024-10-31

## 2024-10-31 LAB
ALBUMIN SERPL-MCNC: 4.3 G/DL (ref 3.7–4.7)
ALP SERPL-CCNC: 49 IU/L (ref 44–121)
ALT SERPL-CCNC: 9 IU/L (ref 0–44)
AST SERPL-CCNC: 14 IU/L (ref 0–40)
BASOPHILS # BLD AUTO: 0 X10E3/UL (ref 0–0.2)
BASOPHILS NFR BLD AUTO: 0 %
BILIRUB SERPL-MCNC: 0.3 MG/DL (ref 0–1.2)
BUN SERPL-MCNC: 23 MG/DL (ref 8–27)
BUN/CREAT SERPL: 16 (ref 10–24)
CALCIUM SERPL-MCNC: 9.6 MG/DL (ref 8.6–10.2)
CHLORIDE SERPL-SCNC: 107 MMOL/L (ref 96–106)
CHOLEST SERPL-MCNC: 120 MG/DL (ref 100–199)
CO2 SERPL-SCNC: 17 MMOL/L (ref 20–29)
CREAT SERPL-MCNC: 1.47 MG/DL (ref 0.76–1.27)
EGFR: 48 ML/MIN/1.73
EOSINOPHIL # BLD AUTO: 0.4 X10E3/UL (ref 0–0.4)
EOSINOPHIL NFR BLD AUTO: 4 %
ERYTHROCYTE [DISTWIDTH] IN BLOOD BY AUTOMATED COUNT: 15.6 % (ref 11.6–15.4)
GLOBULIN SER-MCNC: 2 G/DL (ref 1.5–4.5)
GLUCOSE SERPL-MCNC: 120 MG/DL (ref 70–99)
HBA1C MFR BLD: 6.6 % (ref 4.8–5.6)
HCT VFR BLD AUTO: 41.2 % (ref 37.5–51)
HDLC SERPL-MCNC: 52 MG/DL
HGB BLD-MCNC: 13.1 G/DL (ref 13–17.7)
IMM GRANULOCYTES # BLD: 0.1 X10E3/UL (ref 0–0.1)
IMM GRANULOCYTES NFR BLD: 1 %
LDLC SERPL CALC-MCNC: 49 MG/DL (ref 0–99)
LYMPHOCYTES # BLD AUTO: 2.2 X10E3/UL (ref 0.7–3.1)
LYMPHOCYTES NFR BLD AUTO: 24 %
MCH RBC QN AUTO: 29.2 PG (ref 26.6–33)
MCHC RBC AUTO-ENTMCNC: 31.8 G/DL (ref 31.5–35.7)
MCV RBC AUTO: 92 FL (ref 79–97)
MONOCYTES # BLD AUTO: 0.6 X10E3/UL (ref 0.1–0.9)
MONOCYTES NFR BLD AUTO: 7 %
MORPHOLOGY BLD-IMP: ABNORMAL
NEUTROPHILS # BLD AUTO: 6 X10E3/UL (ref 1.4–7)
NEUTROPHILS NFR BLD AUTO: 64 %
PLATELET # BLD AUTO: 1096 X10E3/UL (ref 150–450)
POTASSIUM SERPL-SCNC: 5.3 MMOL/L (ref 3.5–5.2)
PROT SERPL-MCNC: 6.3 G/DL (ref 6–8.5)
RBC # BLD AUTO: 4.49 X10E6/UL (ref 4.14–5.8)
SODIUM SERPL-SCNC: 144 MMOL/L (ref 134–144)
TRIGL SERPL-MCNC: 103 MG/DL (ref 0–149)
WBC # BLD AUTO: 9.4 X10E3/UL (ref 3.4–10.8)

## 2024-10-31 NOTE — TELEPHONE ENCOUNTER
Lab Result: Platelets:1,096   Date/Time Drawn: 10/30/2024/ 10:28 am   Ordering Provider: Sumit   Lab Personnel's Name: Linsey       Read back to the lab as documented above     [x]     Secure Chat message sent to on-call provider  [x]     Provider confirmed receipt of message     [x]

## 2024-10-31 NOTE — TELEPHONE ENCOUNTER
Per Dr. Vizcaino- Patient needs to follow up with Hematology. Spoke with patient- Phone number given to call office and schedule appt. Per Dr. Vizcaino, cancel AM appt. Patient has no further questions

## 2024-10-31 NOTE — TELEPHONE ENCOUNTER
I got an Epic secure chart message regarding pt's very elevated platelet count of 1096 from [employer].   A review of his chart shows that he does have a hx of thrombocytosis but it has worsened from the last count of 690.  I called and spoke to pt and he denies any new symptoms and states that his lower extremity numbness and heaviness have improved since he was started on his new medicine? Cilostazol.   Of note, he is not sure if he has a hematologist. .   I counseled him to call and make an appt to see his PCP ASAP and to go to the ED or call 911 if he develops any neurological symptoms at all including weakness, numbness, difficulty swallowing, change in speech, change in vision, dizziness etc  I have sent a message to the office staff to get him to see his pcp or any other provider ASAP.

## 2024-11-06 ENCOUNTER — HOSPITAL ENCOUNTER (OUTPATIENT)
Facility: HOSPITAL | Age: 79
Setting detail: OUTPATIENT SURGERY
Discharge: HOME | End: 2024-11-06
Attending: INTERNAL MEDICINE | Admitting: INTERNAL MEDICINE
Payer: COMMERCIAL

## 2024-11-06 VITALS
BODY MASS INDEX: 31.36 KG/M2 | TEMPERATURE: 98.3 F | OXYGEN SATURATION: 98 % | HEART RATE: 67 BPM | HEIGHT: 63 IN | DIASTOLIC BLOOD PRESSURE: 90 MMHG | RESPIRATION RATE: 18 BRPM | WEIGHT: 177 LBS | SYSTOLIC BLOOD PRESSURE: 138 MMHG

## 2024-11-06 DIAGNOSIS — I25.10 CORONARY ARTERY DISEASE INVOLVING NATIVE CORONARY ARTERY OF NATIVE HEART WITHOUT ANGINA PECTORIS: ICD-10-CM

## 2024-11-06 DIAGNOSIS — G45.9 TRANSIENT ISCHEMIC ATTACK: ICD-10-CM

## 2024-11-06 DIAGNOSIS — I65.23 CAROTID ARTERY STENOSIS, ASYMPTOMATIC, BILATERAL: ICD-10-CM

## 2024-11-06 PROCEDURE — NC001 PR NO CHARGE: Performed by: PHYSICIAN ASSISTANT

## 2024-11-06 PROCEDURE — C1764 EVENT RECORDER, CARDIAC: HCPCS | Performed by: INTERNAL MEDICINE

## 2024-11-06 PROCEDURE — 33285 INSJ SUBQ CAR RHYTHM MNTR: CPT | Performed by: INTERNAL MEDICINE

## 2024-11-06 PROCEDURE — 33285 INSJ SUBQ CAR RHYTHM MNTR: CPT | Performed by: PHYSICIAN ASSISTANT

## 2024-11-06 DEVICE — MONITOR INSERTABLE CARDIAC ASSERT IQ EL+: Type: IMPLANTABLE DEVICE | Status: FUNCTIONAL

## 2024-11-06 RX ORDER — LIDOCAINE HYDROCHLORIDE 10 MG/ML
INJECTION, SOLUTION EPIDURAL; INFILTRATION; INTRACAUDAL; PERINEURAL
Status: DISCONTINUED
Start: 2024-11-06 | End: 2024-11-06 | Stop reason: HOSPADM

## 2024-11-06 RX ORDER — LIDOCAINE HYDROCHLORIDE 10 MG/ML
INJECTION, SOLUTION EPIDURAL; INFILTRATION; INTRACAUDAL; PERINEURAL CODE/TRAUMA/SEDATION MEDICATION
Status: DISCONTINUED | OUTPATIENT
Start: 2024-11-06 | End: 2024-11-06 | Stop reason: HOSPADM

## 2024-11-06 NOTE — DISCHARGE INSTR - AVS FIRST PAGE
Post Procedure Care instructions:     Pain management   Tylenol (acetaminophen) and ice        First 7 days:  Bandage must remain on wound for first 48 hours then you may take it off  Do not use lotions, powders, creams, or ointments on incision  Bathing:    Place waterproof bandage over top when showering   Avoid water directly hitting bandage   Do not soak incision   After 7 days:  If glue is present:  Avoid picking at the glue or peeling it off, the glue will come off on its own  If stiches present:   Leave in place, they will be removed at your 2 week follow up appointment      When to call clinic:    Redness or swelling at incision site   Opening and/or drainage from the incision   Temperature >100.4 F     If you have any questions:   [phone] 8:30am-5:30pm  [phone] After hours  [phone] Appointments     Information about your device:     WHAT YOU SHOULD KNOW:    A cardiac loop recorder is a device used to diagnose heart rhythm problems, such as a fast or irregular heartbeat. It is implanted in your left chest, just under the skin. The device records a pattern of your heart's rhythm, called an EKG. Your device records automatic EKGs, depending on how your caregiver programs it. You may also receive a handheld controller. You press a button on the controller when you have symptoms, such as dizziness, lightheadedness, or palpitations. The device will record an EKG at that moment. The recording can help your caregiver see if your symptoms may be caused by heart rhythm problems. Your caregiver will remove the device after it has collected enough data. You may need the device for up to 3 years. The procedure to remove the device is similar to the procedure used to implant it.

## 2024-11-06 NOTE — H&P
Patient with hx of TIA of unclear etiology, recommended to have loop recorder implant for arrhythmia monitoring for which he presents today.

## 2024-11-13 ENCOUNTER — OFFICE VISIT (OUTPATIENT)
Dept: CARDIOLOGY CLINIC | Facility: CLINIC | Age: 79
End: 2024-11-13
Payer: COMMERCIAL

## 2024-11-13 ENCOUNTER — IN-CLINIC DEVICE VISIT (OUTPATIENT)
Dept: CARDIOLOGY CLINIC | Facility: CLINIC | Age: 79
End: 2024-11-13
Payer: COMMERCIAL

## 2024-11-13 ENCOUNTER — RESULTS FOLLOW-UP (OUTPATIENT)
Dept: CARDIOLOGY CLINIC | Facility: CLINIC | Age: 79
End: 2024-11-13

## 2024-11-13 VITALS
SYSTOLIC BLOOD PRESSURE: 122 MMHG | HEIGHT: 63 IN | OXYGEN SATURATION: 96 % | DIASTOLIC BLOOD PRESSURE: 70 MMHG | HEART RATE: 65 BPM | WEIGHT: 183 LBS | BODY MASS INDEX: 32.43 KG/M2

## 2024-11-13 DIAGNOSIS — I25.10 CORONARY ARTERY DISEASE INVOLVING NATIVE CORONARY ARTERY OF NATIVE HEART WITHOUT ANGINA PECTORIS: ICD-10-CM

## 2024-11-13 DIAGNOSIS — Z95.818 PRESENCE OF CARDIAC DEVICE: ICD-10-CM

## 2024-11-13 DIAGNOSIS — I73.9 PAD (PERIPHERAL ARTERY DISEASE) (HCC): ICD-10-CM

## 2024-11-13 DIAGNOSIS — I65.23 CAROTID ARTERY STENOSIS, ASYMPTOMATIC, BILATERAL: Primary | ICD-10-CM

## 2024-11-13 DIAGNOSIS — E78.00 HYPERCHOLESTEROLEMIA: ICD-10-CM

## 2024-11-13 DIAGNOSIS — Z86.73 PERSONAL HISTORY OF TIA (TRANSIENT ISCHEMIC ATTACK): Primary | ICD-10-CM

## 2024-11-13 DIAGNOSIS — N18.31 STAGE 3A CHRONIC KIDNEY DISEASE (HCC): ICD-10-CM

## 2024-11-13 PROCEDURE — 99214 OFFICE O/P EST MOD 30 MIN: CPT | Performed by: INTERNAL MEDICINE

## 2024-11-13 PROCEDURE — 93291 INTERROG DEV EVAL SCRMS IP: CPT | Performed by: INTERNAL MEDICINE

## 2024-11-13 RX ORDER — ASPIRIN 81 MG/1
81 TABLET, CHEWABLE ORAL DAILY
Start: 2024-11-13

## 2024-11-13 NOTE — PROGRESS NOTES
Cardiology Follow Up    Adarsh Sheppard  1945  [id number]  [facility]  [address]  [address]  [place] [address] [phone]    1. Carotid artery stenosis, asymptomatic, bilateral  aspirin 81 mg chewable tablet      2. Coronary artery disease involving native coronary artery of native heart without angina pectoris  Lipid Panel with Direct LDL reflex      3. PAD (peripheral artery disease) ([facility])        4. Stage 3a chronic kidney disease ([facility])        5. Hypercholesterolemia            Discussion/Summary: Overall he is feeling well denies any symptoms.  Loop recorder was implanted for long-term surveillance for atrial fibrillation.  Pletal had been added for lower extremity claudication symptoms which seems to have helped.  He was taken off Plavix and put on 81 aspirin by neurology.  Continue risk factor management.  From a cardiac standpoint coronary disease is stable no active angina he is euvolemic on exam continue current medications he needs a lipid panel in a couple of months.  Unclear why his Crestor dose was reduced      Interval History:   78-year-old gentle with a history of coronary artery disease status post CABG 10 years ago presents for a routine scheduled follow-up visit.  He also has a history of hypertension, hyperlipidemia, mild carotid plaque.  He has been rather sedentary since her last visit.      He suffers from chronic back pain he presents today for preoperative risk assessment for spine surgery.  Coronary disease stable with no complaints of angina.  Functional capacity is quite good.     Overall he has been feeling much better.  No further neurologic symptoms.  Denies any chest pain, shortness of breath, palpitations, lightheadedness, dizziness, or syncope.  Is been no lower extremity edema, PND, orthopnea.  He has been taking all medications as prescribed.          Problem List       Myalgia     Polyarthralgia    Fatigue    Low vitamin D level    Lumbar pain    Adolescent idiopathic scoliosis of thoracolumbar region    Abnormal glucose    Benign essential HTN    BPH (benign prostatic hyperplasia)    Depression    Gastroesophageal reflux disease without esophagitis    Hypercholesterolemia    Scoliosis    Screening for malignant neoplasm of prostate    Coronary artery disease involving native coronary artery of native heart without angina pectoris          Past Medical History:   Diagnosis Date    Actinic keratosis     RESOLVED: [id number]    Adolescent idiopathic scoliosis of thoracolumbar region 04/12/2018    Allergic     Anxiety     Arthritis     Basal cell carcinoma of back     RESOLVED: [id number]    Basal cell carcinoma of skin of upper extremity, including shoulder     BCC (basal cell carcinoma), trunk     BCE (basal cell epithelioma), trunk     RESOLVED: [id number]    Benign neoplasm of skin of face     Chronic kidney disease     COPD (chronic obstructive pulmonary disease) (HCC)     Coronary artery disease involving native coronary artery of native heart without angina pectoris 10/16/2018    Depression 10/06/2015    Gastroesophageal reflux disease without esophagitis 04/21/2017    GERD (gastroesophageal reflux disease)     Hypertension     Myocardial infarction (HCC)     Neoplasm of uncertain behavior of skin     Nonmelanoma skin cancer     LAST ASSESSED: [id number]    Pneumonia     Scoliosis     Seborrheic keratosis     RESOLVED: [id number]    Stroke (HCC)      Social History     Socioeconomic History    Marital status: /Civil Union     Spouse name: Not on file    Number of children: Not on file    Years of education: Not on file    Highest education level: Not on file   Occupational History    Not on file   Tobacco Use    Smoking status: Light Smoker     Types: Cigars    Smokeless tobacco: Never    Tobacco comments:     one cigar once in awhile   Vaping Use    Vaping status: Never Used    Substance and Sexual Activity    Alcohol use: Yes     Alcohol/week: 1.0 standard drink of alcohol     Types: 1 Glasses of wine per week     Comment: occasionally    Drug use: No    Sexual activity: Not Currently     Partners: Female     Birth control/protection: Male Sterilization   Other Topics Concern    Not on file   Social History Narrative    Not on file     Social Drivers of Health     Financial Resource Strain: Low Risk  (8/27/2024)    Received from [facility]    Overall Financial Resource Strain (CARDIA)     Difficulty of Paying Living Expenses: Not very hard   Food Insecurity: Food Insecurity Present (8/29/2024)    Nursing - Inadequate Food Risk Classification     Worried About Running Out of Food in the Last Year: Never true     Ran Out of Food in the Last Year: Sometimes true     Ran Out of Food in the Last Year: Not on file   Transportation Needs: No Transportation Needs (8/29/2024)    PRAPARE - Transportation     Lack of Transportation (Medical): No     Lack of Transportation (Non-Medical): No   Physical Activity: Not on file   Stress: Not on file   Social Connections: Not on file   Intimate Partner Violence: Not At Risk (8/27/2024)    Received from [facility]    Humiliation, Afraid, Rape, and Kick questionnaire     Fear of Current or Ex-Partner: No     Emotionally Abused: No     Physically Abused: No     Sexually Abused: No   Housing Stability: Low Risk  (8/29/2024)    Housing Stability Vital Sign     Unable to Pay for Housing in the Last Year: No     Number of Times Moved in the Last Year: 0     Homeless in the Last Year: No      Family History   Problem Relation Age of Onset    Other Mother         BRAIN TUMOR    No Known Problems Father     Vascular Disease Brother     Cancer Brother     Dementia Brother     Heart disease Brother     COPD Brother     Prostate cancer Brother     Substance Abuse Brother     Drug abuse Brother      Past Surgical History:    Procedure Laterality Date    ANAL FISTULOTOMY  06/13/2011    DR. SANCHEZ    BASAL CELL CARCINOMA EXCISION  11/2023    back    CARDIAC ELECTROPHYSIOLOGY PROCEDURE N/A 11/6/2024    Procedure: Cardiac loop recorder implant;  Surgeon: Tucker Roper MD;  [facility]: [facility];  Service: Cardiology    CORONARY ARTERY BYPASS GRAFT  05/17/2005    TRIPLE; DR. PETTY SURGEON [facility]     HEMORRHOID SURGERY  10/2009    DR. MARRERO    IR BIOPSY BONE MARROW  07/24/2023    JOINT REPLACEMENT      shoulder replacement    KNEE SURGERY      IL SURG TX ANAL FISTULA INTERSPHINCTERIC N/A 6/7/2024    Procedure: ANAL FISTULOTOMY;  Surgeon: JOSE ALFREDO Ann MD;  [facility];  Service: Colorectal    SHOULDER SURGERY  06/18/2015    JOINT REPLACEMENT; DR. MONTGOMERY    SPINE SURGERY  12/2020    spine fusion       Current Outpatient Medications:     allopurinol (ZYLOPRIM) 100 mg tablet, Take 1 tablet (100 mg total) by mouth daily (Patient taking differently: Take 100 mg by mouth daily As needed), Disp: 90 tablet, Rfl: 1    amLODIPine (NORVASC) 5 mg tablet, Take 1 tablet (5 mg total) by mouth daily, Disp: 90 tablet, Rfl: 0    anagrelide (AGRYLIN) 0.5 MG capsule, Take 0.5 mg by mouth 2 (two) times a day, Disp: , Rfl:     aspirin 81 mg chewable tablet, Chew 1 tablet (81 mg total) daily, Disp: , Rfl:     carvedilol (COREG) 25 mg tablet, Take 1 tablet (25 mg total) by mouth 2 (two) times a day with meals, Disp: 180 tablet, Rfl: 1    cholecalciferol (VITAMIN D3) 1,000 units tablet, Take 2,000 Units by mouth 2 (two) times a week, Disp: , Rfl:     cilostazol (PLETAL) 50 mg tablet, Take 1 tablet (50 mg total) by mouth 2 (two) times a day, Disp: 60 tablet, Rfl: 11    colchicine (COLCRYS) 0.6 mg tablet, Take 1 tablet (0.6 mg total) by mouth daily (Patient taking differently: Take 0.6 mg by mouth daily as needed (gout)), Disp: 30 tablet, Rfl: 1    Multiple Vitamin (multivitamin) tablet, Take 1 tablet by mouth daily, Disp:  ", Rfl:     rosuvastatin (CRESTOR) 5 mg tablet, Take 5 mg by mouth daily at bedtime, Disp: , Rfl:     Thiamine HCl (VITAMIN B1 PO), Take 1 tablet by mouth in the morning, Disp: , Rfl:   Allergies   Allergen Reactions    Other      [place] trees    Hydroxyurea Rash       Labs:     Chemistry        Component Value Date/Time     07/21/2015 0954    K 5.3 (H) 10/30/2024 1028    K 4.0 08/27/2024 1706     (H) 10/30/2024 1028     08/27/2024 1706    CO2 17 (L) 10/30/2024 1028    CO2 23 08/27/2024 1706    BUN 23 10/30/2024 1028    BUN 21 08/27/2024 1706    CREATININE 1.47 (H) 10/30/2024 1028    CREATININE 1.43 (H) 08/27/2024 1706        Component Value Date/Time    CALCIUM 9.4 08/27/2024 1706    ALKPHOS 41 08/27/2024 1706    AST 14 10/30/2024 1028    AST 17 08/27/2024 1706    ALT 9 10/30/2024 1028    ALT 12 08/27/2024 1706    BILITOT 0.53 07/21/2015 0954            Lab Results   Component Value Date    CHOL 142 12/16/2015    CHOL 231 07/21/2015     Lab Results   Component Value Date    HDL 52 10/30/2024    HDL 52 06/18/2024    HDL 41 11/30/2023     Lab Results   Component Value Date    LDLCALC 49 10/30/2024    LDLCALC 31 06/18/2024    LDLCALC 115 (H) 11/30/2023     Lab Results   Component Value Date    TRIG 103 10/30/2024    TRIG 57 06/18/2024    TRIG 168 (H) 11/30/2023     No results found for: \"CHOLHDL\"    Imaging: No results found.    ECG:  Sinus bradycardia inferior infarct age indeterminate      Review of Systems   Constitutional: Negative.   HENT: Negative.     Eyes: Negative.    Cardiovascular: Negative.    Respiratory: Negative.     Endocrine: Negative.    Hematologic/Lymphatic: Negative.    Skin: Negative.    Musculoskeletal: Negative.    Gastrointestinal: Negative.    Genitourinary: Negative.    Neurological: Negative.    Psychiatric/Behavioral: Negative.     All other systems reviewed and are negative.      Vitals:    11/13/24 1411   BP: 122/70   Pulse: 65   SpO2: 96%     Vitals:    11/13/24 1411 " "  Weight: 83 kg (183 lb)     Height: 5' 3\" (160 cm)   Body mass index is 32.42 kg/m².  Physical Exam:  Vital signs reviewed  General:  Alert and cooperative, appears stated age, no acute distress  HEENT:  PERRLA, EOMI, no scleral icterus, no conjunctival pallor  Neck:  No lymphadenopathy, no thyromegaly, no carotid bruits, no elevated JVP  Heart:  Regular rate and rhythm, normal S1/S2, no S3/S4, no murmur, rubs or gallops.  PMI nondisplaced  Lungs:  Clear to auscultation bilaterally, no wheezes rales or rhonchi  Abdomen:  Soft, non-tender, positive bowel sounds, no rebound or guarding,   no organomegaly   Extremities:  Normal range of motion.  No clubbing, cyanosis or edema   Vascular:  2+ pedal pulses  Skin:  No rashes or lesions on exposed skin  Neurologic:  Cranial nerves II-XII grossly intact without focal deficits  Psych:  Normal mood and affect        "

## 2024-11-13 NOTE — PROGRESS NOTES
Results for orders placed or performed in visit on 11/13/24   Cardiac EP device report    Narrative    JONES SJ [id number] ILR/ACTIVE SYSTEM IS MRI CONDITIONAL  Monitoring: EnabledReason for Monitoring: Cryptogenic StrokeArrhythmia Detection during Activity: OnNote: Counter field 'Other' reflects patient activated episode with concurrent device detection.  DEVICE INTERROGATED IN THE [facility] OFFICE.  WOUND CHECK: INCISION CLEAN AND DRY WITH EDGES APPROXIMATED; SUTURES REMOVED BY DANIA CAMPO PA-C; WOUND CARE AND RESTRICTIONS REVIEWED WITH PATIENT (NO PIC AVAIL).  BATTERY VOLTAGE ADEQUATE.  PRESENTING RHYTHM SB 52 BPM.  R WAVE 0.79MV.  NO PATIENT OR DEVICE ACTIVATED EPISODES.  NO PROGRAMMING CHANGES MADE TO DEVICE PARAMETERS. NORMAL DEVICE FUNCTION.  PAS

## 2024-11-17 DIAGNOSIS — I10 BENIGN ESSENTIAL HTN: ICD-10-CM

## 2024-11-18 RX ORDER — AMLODIPINE BESYLATE 5 MG/1
5 TABLET ORAL DAILY
Qty: 90 TABLET | Refills: 1 | Status: SHIPPED | OUTPATIENT
Start: 2024-11-18

## 2024-11-21 DIAGNOSIS — E78.00 HYPERCHOLESTEROLEMIA: ICD-10-CM

## 2024-11-21 RX ORDER — CARVEDILOL 25 MG/1
25 TABLET ORAL 2 TIMES DAILY WITH MEALS
Qty: 180 TABLET | Refills: 1 | Status: SHIPPED | OUTPATIENT
Start: 2024-11-21

## 2024-11-25 ENCOUNTER — TELEPHONE (OUTPATIENT)
Age: 79
End: 2024-11-25

## 2024-11-25 NOTE — TELEPHONE ENCOUNTER
Received call from pt regarding his ILR implant. Pt states he received a call about asking him to submit an image of his ILR implant site.  Warm transferred pt to [facility] in device clinic to assist further.

## 2024-11-29 ENCOUNTER — CLINICAL SUPPORT (OUTPATIENT)
Dept: CARDIOLOGY CLINIC | Facility: CLINIC | Age: 79
End: 2024-11-29
Payer: COMMERCIAL

## 2024-11-29 DIAGNOSIS — Z95.818 PRESENCE OF CARDIAC DEVICE: Primary | ICD-10-CM

## 2024-11-29 PROCEDURE — 99211 OFF/OP EST MAY X REQ PHY/QHP: CPT

## 2024-11-29 NOTE — PROGRESS NOTES
Pt presents into the office today for a device site recheck. Pt had a loop recorder implant on 11/6. He denies any cardiac symptoms.      Site reviewed by Phillip Samaniego PA-C to which appears to be healing really well. Pt will continue to monitor.

## 2024-12-05 ENCOUNTER — OFFICE VISIT (OUTPATIENT)
Dept: INTERNAL MEDICINE CLINIC | Age: 79
End: 2024-12-05
Payer: COMMERCIAL

## 2024-12-05 VITALS
HEIGHT: 63 IN | HEART RATE: 62 BPM | OXYGEN SATURATION: 95 % | BODY MASS INDEX: 33.31 KG/M2 | WEIGHT: 188 LBS | SYSTOLIC BLOOD PRESSURE: 110 MMHG | TEMPERATURE: 97.3 F | DIASTOLIC BLOOD PRESSURE: 62 MMHG

## 2024-12-05 DIAGNOSIS — Z71.85 VACCINE COUNSELING: ICD-10-CM

## 2024-12-05 DIAGNOSIS — G45.9 TRANSIENT ISCHEMIC ATTACK: ICD-10-CM

## 2024-12-05 DIAGNOSIS — Z00.00 MEDICARE ANNUAL WELLNESS VISIT, SUBSEQUENT: Primary | ICD-10-CM

## 2024-12-05 DIAGNOSIS — N18.31 STAGE 3A CHRONIC KIDNEY DISEASE (HCC): ICD-10-CM

## 2024-12-05 DIAGNOSIS — N18.31 TYPE 2 DIABETES MELLITUS WITH STAGE 3A CHRONIC KIDNEY DISEASE, WITHOUT LONG-TERM CURRENT USE OF INSULIN (HCC): ICD-10-CM

## 2024-12-05 DIAGNOSIS — D68.59 THROMBOPHILIA (HCC): ICD-10-CM

## 2024-12-05 DIAGNOSIS — E11.22 TYPE 2 DIABETES MELLITUS WITH STAGE 3A CHRONIC KIDNEY DISEASE, WITHOUT LONG-TERM CURRENT USE OF INSULIN (HCC): ICD-10-CM

## 2024-12-05 PROCEDURE — 99214 OFFICE O/P EST MOD 30 MIN: CPT | Performed by: INTERNAL MEDICINE

## 2024-12-05 PROCEDURE — G0439 PPPS, SUBSEQ VISIT: HCPCS | Performed by: INTERNAL MEDICINE

## 2024-12-05 NOTE — PATIENT INSTRUCTIONS
Please call the hematologist for your high platelets  Please get your blood and urine work done in 3 months prior to your next appointment with Dr. Vizcaino

## 2024-12-05 NOTE — ASSESSMENT & PLAN NOTE
Lab Results   Component Value Date    HGBA1C 6.6 (H) 10/30/2024     Within goal for A1c.     Orders:    Albumin / creatinine urine ratio; Future    Hemoglobin A1C; Future

## 2024-12-05 NOTE — PROGRESS NOTES
Diabetic Foot Exam    Patient's shoes and socks removed.    Right Foot/Ankle   Right Foot Inspection  Skin Exam: skin normal and skin intact. No dry skin, no warmth, no callus, no erythema, no maceration, no abnormal color, no pre-ulcer, no ulcer and no callus.     Toe Exam: ROM and strength within normal limits.     Sensory   Monofilament testing: intact    Vascular  The right DP pulse is 2+. The right PT pulse is 2+.     Left Foot/Ankle  Left Foot Inspection  Skin Exam: skin normal and skin intact. No dry skin, no warmth, no erythema, no maceration, normal color, no pre-ulcer, no ulcer and no callus.     Toe Exam: ROM and strength within normal limits.     Sensory   Monofilament testing: intact    Vascular  The left DP pulse is 2+. The left PT pulse is 2+.     Assign Risk Category  No deformity present  No loss of protective sensation  No weak pulses  Risk: 0  Name: Adarsh Sheppard      : 1945      MRN: [id number]  Encounter Provider: Alondra Gonzalez DO  Encounter Date: 2024   Encounter department: [facility]    Assessment & Plan  Medicare annual wellness visit, subsequent         Thrombophilia (HCC)  Last cbc in October showing Plts >10K. Pt previously given oncology hematology referral. Hx tia. Encouraged pt to make appointment with hematology and given their number. Repeat cbc in 4 mo prior to next appointment.     Orders:    CBC and differential; Future    Transient ischemic attack  Possible TIA in August now on aspirin, crestor. Encouraged smoking cessation, work-up of thrombophilia.        Type 2 diabetes mellitus with stage 3a chronic kidney disease, without long-term current use of insulin ([facility])    Lab Results   Component Value Date    HGBA1C 6.6 (H) 10/30/2024     Within goal for A1c.     Orders:    Albumin / creatinine urine ratio; Future    Hemoglobin A1C; Future    Vaccine counseling  Recommended Zoster vaccine.        Stage 3a chronic kidney disease  ([facility])  Lab Results   Component Value Date    EGFR 48 (L) 10/30/2024    EGFR 43 (L) 10/09/2024    EGFR 49 (L) 08/27/2024    CREATININE 1.47 (H) 10/30/2024    CREATININE 1.59 (H) 10/09/2024    CREATININE 1.43 (H) 08/27/2024       Orders:    Basic metabolic panel; Future      Depression Screening and Follow-up Plan: Patient was screened for depression during today's encounter. They screened negative with a PHQ-2 score of 1.    Tobacco Cessation Counseling: Tobacco cessation counseling was provided. The patient is sincerely urged to quit consumption of tobacco. He is not ready to quit tobacco. Currently smoking cigars every once in a while. Does not inhale. Counseled on importance of complete cessation.       Preventive health issues were discussed with patient, and age appropriate screening tests were ordered as noted in patient's After Visit Summary. Personalized health advice and appropriate referrals for health education or preventive services given if needed, as noted in patient's After Visit Summary.    History of Present Illness     79 YOM pmhx ckd, copd, mi, tia, gerd, htn, dm2 here for annual medicare wellness. States no complaints today.        Patient Care Team:  Shane Vizcaino MD as PCP - General (Internal Medicine)  DO Roddy Colon MD Sudip Nanda, MD    Review of Systems   Constitutional:  Negative for chills and fever.   HENT:  Negative for ear pain and sore throat.    Eyes:  Negative for pain and visual disturbance.   Respiratory:  Negative for cough and shortness of breath.    Cardiovascular:  Negative for chest pain and palpitations.   Gastrointestinal:  Negative for abdominal pain and vomiting.   Genitourinary:  Negative for dysuria and hematuria.   Musculoskeletal:  Negative for arthralgias and back pain.   Skin:  Negative for color change and rash.   Neurological:  Negative for seizures and syncope.   All other systems reviewed and are negative.    Medical History Reviewed  by provider this encounter:  [facility]       Annual Wellness Visit Questionnaire   Adarsh is here for his Subsequent Wellness visit. Last Medicare Wellness visit information reviewed, patient interviewed and updates made to the record today.      Health Risk Assessment:   Patient rates overall health as very good. Patient feels that their physical health rating is slightly better. Patient is satisfied with their life. Eyesight was rated as same. Hearing was rated as same. Patient feels that their emotional and mental health rating is slightly better. Patients states they are never, rarely angry. Patient states they are sometimes unusually tired/fatigued. Pain experienced in the last 7 days has been some. Patient's pain rating has been 4/10. Patient states that he has experienced no weight loss or gain in last 6 months.     Depression Screening:   PHQ-2 Score: 1      Fall Risk Screening:   In the past year, patient has experienced: no history of falling in past year      Home Safety:  Patient does not have trouble with stairs inside or outside of their home. Patient has working smoke alarms and has working carbon monoxide detector. Home safety hazards include: none.     Nutrition:   Current diet is Limited junk food and Regular.     Medications:   Patient is currently taking over-the-counter supplements. OTC medications include: see medication list. Patient is able to manage medications.     Activities of Daily Living (ADLs)/Instrumental Activities of Daily Living (IADLs):   Walk and transfer into and out of bed and chair?: Yes  Dress and groom yourself?: Yes    Bathe or shower yourself?: Yes    Feed yourself? Yes  Do your laundry/housekeeping?: Yes  Manage your money, pay your bills and track your expenses?: Yes  Make your own meals?: Yes    Do your own shopping?: Yes    Previous Hospitalizations:   Any hospitalizations or ED visits within the last 12 months?: Yes    How many hospitalizations have you had in the last  "year?: 1-2    Advance Care Planning:   Living will: Yes    Durable POA for healthcare: Yes    Advanced directive: Yes      PREVENTIVE SCREENINGS      Cardiovascular Screening:    General: Screening Not Indicated and History Lipid Disorder      Diabetes Screening:     General: Screening Not Indicated and History Diabetes      Colorectal Cancer Screening:     General: Screening Current      Prostate Cancer Screening:    General: Screening Not Indicated      Abdominal Aortic Aneurysm (AAA) Screening:    Risk factors include: tobacco use        Lung Cancer Screening:     General: Screening Not Indicated      Hepatitis C Screening:    General: Screening Current    Social Drivers of Health     Financial Resource Strain: Low Risk  (8/27/2024)    Received from [facility]    Overall Financial Resource Strain (CARDIA)     Difficulty of Paying Living Expenses: Not very hard   Food Insecurity: No Food Insecurity (12/5/2024)    Hunger Vital Sign     Worried About Running Out of Food in the Last Year: Never true     Ran Out of Food in the Last Year: Never true   Transportation Needs: No Transportation Needs (12/5/2024)    PRAPARE - Transportation     Lack of Transportation (Medical): No     Lack of Transportation (Non-Medical): No   Housing Stability: Low Risk  (12/5/2024)    Housing Stability Vital Sign     Unable to Pay for Housing in the Last Year: No     Number of Times Moved in the Last Year: 0     Homeless in the Last Year: No   Utilities: Not At Risk (12/5/2024)    [facility] Utilities     Threatened with loss of utilities: No     No results found.    Objective   /62 (BP Location: Left arm, Patient Position: Sitting, Cuff Size: Large)   Pulse 62   Temp (!) 97.3 °F (36.3 °C) (Temporal)   Ht 5' 3\" (1.6 m)   Wt 85.3 kg (188 lb)   SpO2 95%   BMI 33.30 kg/m²     Physical Exam  Vitals and nursing note reviewed.   Constitutional:       General: He is not in acute distress.     Appearance: He is " well-developed.   HENT:      Head: Normocephalic and atraumatic.      Mouth/Throat:      Mouth: Mucous membranes are moist.   Eyes:      Conjunctiva/sclera: Conjunctivae normal.   Cardiovascular:      Rate and Rhythm: Normal rate and regular rhythm.      Pulses: no weak pulses.           Dorsalis pedis pulses are 2+ on the right side and 2+ on the left side.        Posterior tibial pulses are 2+ on the right side and 2+ on the left side.      Heart sounds: No murmur heard.  Pulmonary:      Effort: Pulmonary effort is normal. No respiratory distress.      Breath sounds: Normal breath sounds.   Abdominal:      Palpations: Abdomen is soft.      Tenderness: There is no abdominal tenderness.   Musculoskeletal:         General: No swelling.      Cervical back: Neck supple.      Right lower leg: No edema.      Left lower leg: No edema.   Feet:      Right foot:      Skin integrity: No ulcer, skin breakdown, erythema, warmth, callus or dry skin.      Left foot:      Skin integrity: No ulcer, skin breakdown, erythema, warmth, callus or dry skin.   Skin:     General: Skin is warm and dry.      Capillary Refill: Capillary refill takes less than 2 seconds.   Neurological:      Mental Status: He is alert and oriented to person, place, and time. Mental status is at baseline.   Psychiatric:         Mood and Affect: Mood normal.         Behavior: Behavior normal.         Thought Content: Thought content normal.         Judgment: Judgment normal.

## 2024-12-05 NOTE — ASSESSMENT & PLAN NOTE
Lab Results   Component Value Date    EGFR 48 (L) 10/30/2024    EGFR 43 (L) 10/09/2024    EGFR 49 (L) 08/27/2024    CREATININE 1.47 (H) 10/30/2024    CREATININE 1.59 (H) 10/09/2024    CREATININE 1.43 (H) 08/27/2024       Orders:    Basic metabolic panel; Future

## 2025-02-04 LAB
LEFT EYE DIABETIC RETINOPATHY: NORMAL
RIGHT EYE DIABETIC RETINOPATHY: NORMAL

## 2025-02-14 ENCOUNTER — REMOTE DEVICE CLINIC VISIT (OUTPATIENT)
Dept: CARDIOLOGY CLINIC | Facility: CLINIC | Age: 80
End: 2025-02-14
Payer: COMMERCIAL

## 2025-02-14 DIAGNOSIS — G45.9 TRANSIENT ISCHEMIC ATTACK: Primary | ICD-10-CM

## 2025-02-14 PROCEDURE — 93298 REM INTERROG DEV EVAL SCRMS: CPT | Performed by: INTERNAL MEDICINE

## 2025-02-14 NOTE — PROGRESS NOTES
"[facility] [id number] ILR/ACTIVE SYSTEM IS MRI CONDITIONAL  [employer] TRANSMISSION: LOOP RECORDER. PRESENTING RHYTHM VS @ 66 BPM. BATTERY STATUS \"OK.\" NO PATIENT OR DEVICE ACTIVATED EPISODES. NORMAL DEVICE FUNCTION. DL     "

## 2025-02-16 ENCOUNTER — RESULTS FOLLOW-UP (OUTPATIENT)
Dept: CARDIOLOGY CLINIC | Facility: CLINIC | Age: 80
End: 2025-02-16

## 2025-02-28 LAB
CREAT ?TM UR-SCNC: 27.2 UMOL/L
EXT ALBUMIN URINE RANDOM: 3
HBA1C MFR BLD HPLC: 6.5 %
MICROALBUMIN/CREAT UR: <11 MG/G{CREAT}

## 2025-03-11 ENCOUNTER — OFFICE VISIT (OUTPATIENT)
Dept: INTERNAL MEDICINE CLINIC | Age: 80
End: 2025-03-11
Payer: COMMERCIAL

## 2025-03-11 VITALS
SYSTOLIC BLOOD PRESSURE: 118 MMHG | HEIGHT: 63 IN | HEART RATE: 65 BPM | BODY MASS INDEX: 32.96 KG/M2 | WEIGHT: 186 LBS | DIASTOLIC BLOOD PRESSURE: 66 MMHG | OXYGEN SATURATION: 94 % | TEMPERATURE: 98.4 F

## 2025-03-11 DIAGNOSIS — E87.5 HYPERKALEMIA: ICD-10-CM

## 2025-03-11 DIAGNOSIS — J42 CHRONIC BRONCHITIS, UNSPECIFIED CHRONIC BRONCHITIS TYPE (HCC): ICD-10-CM

## 2025-03-11 DIAGNOSIS — N18.31 TYPE 2 DIABETES MELLITUS WITH STAGE 3A CHRONIC KIDNEY DISEASE, WITHOUT LONG-TERM CURRENT USE OF INSULIN (HCC): Primary | ICD-10-CM

## 2025-03-11 DIAGNOSIS — D47.3 ESSENTIAL THROMBOCYTHEMIA (HCC): ICD-10-CM

## 2025-03-11 DIAGNOSIS — I73.9 PAD (PERIPHERAL ARTERY DISEASE) (HCC): ICD-10-CM

## 2025-03-11 DIAGNOSIS — E11.22 TYPE 2 DIABETES MELLITUS WITH STAGE 3A CHRONIC KIDNEY DISEASE, WITHOUT LONG-TERM CURRENT USE OF INSULIN (HCC): Primary | ICD-10-CM

## 2025-03-11 DIAGNOSIS — D47.1 CHRONIC MYELOPROLIFERATIVE DISEASE (HCC): ICD-10-CM

## 2025-03-11 PROCEDURE — G2211 COMPLEX E/M VISIT ADD ON: HCPCS | Performed by: INTERNAL MEDICINE

## 2025-03-11 PROCEDURE — 99214 OFFICE O/P EST MOD 30 MIN: CPT | Performed by: INTERNAL MEDICINE

## 2025-03-11 RX ORDER — SODIUM POLYSTYRENE SULFONATE 4.1 MEQ/G
30 POWDER, FOR SUSPENSION ORAL; RECTAL 3 TIMES WEEKLY
Qty: 120 G | Refills: 1 | Status: SHIPPED | OUTPATIENT
Start: 2025-03-12

## 2025-03-11 NOTE — ASSESSMENT & PLAN NOTE
Will start patient on Kayexalate 30 g 3 times per week and also advised for low potassium diet.  Will repeat BMP next week  Orders:    sodium polystyrene (KAYEXALATE) powder; Take 30 g by mouth 3 (three) times a week

## 2025-03-11 NOTE — PROGRESS NOTES
Name: Adarsh Sheppard      : 1945      MRN: [id number]  Encounter Provider: Shane Vizcaino MD  Encounter Date: 3/11/2025   Encounter department: [facility] [place]  :  Assessment & Plan  Type 2 diabetes mellitus with stage 3a chronic kidney disease, without long-term current use of insulin (HCC)    Lab Results   Component Value Date    HGBA1C 6.6 (H) 10/30/2024     Continue with diabetic diet.  Recent hemoglobin A1c is 6.5.  Will continue to monitor       Chronic bronchitis, unspecified chronic bronchitis type (HCC)  Stable.  No worsening symptoms.  Will continue to monitor       Essential thrombocythemia (HCC)  Platelet count is still 1085 and also followed by hematologist       Hyperkalemia  Will start patient on Kayexalate 30 g 3 times per week and also advised for low potassium diet.  Will repeat BMP next week  Orders:    sodium polystyrene (KAYEXALATE) powder; Take 30 g by mouth 3 (three) times a week    Chronic myeloproliferative disease (HCC)  Managed by hematologist/oncologist       PAD (peripheral artery disease) (HCC)  Since using Pletal his walking distance and increased significantly.  Managed by vascular surgery              History of Present Illness   Patient here for follow-up of her blood work and would like to discuss results.  Otherwise no new complaints.  Leg pain/claudication is better      Review of Systems   Constitutional:  Negative for fatigue and fever.   HENT:  Negative for congestion, ear discharge, ear pain, postnasal drip, sinus pressure, sore throat, tinnitus and trouble swallowing.    Eyes:  Negative for discharge, itching and visual disturbance.   Respiratory:  Negative for cough and shortness of breath.    Cardiovascular:  Negative for chest pain and palpitations.   Gastrointestinal:  Negative for abdominal pain, diarrhea, nausea and vomiting.   Endocrine: Negative for cold intolerance and polyuria.   Genitourinary:  Negative for difficulty  "urinating, dysuria and urgency.   Musculoskeletal:  Positive for arthralgias. Negative for neck pain.   Skin:  Negative for rash.   Allergic/Immunologic: Negative for environmental allergies.   Neurological:  Negative for dizziness, weakness and headaches.   Psychiatric/Behavioral:  Negative for agitation and behavioral problems. The patient is not nervous/anxious.        Objective   /66 (BP Location: Left arm, Patient Position: Sitting, Cuff Size: Standard)   Pulse 65   Temp 98.4 °F (36.9 °C) (Temporal)   Ht 5' 3\" (1.6 m)   Wt 84.4 kg (186 lb)   SpO2 94%   BMI 32.95 kg/m²      Physical Exam  Constitutional:       General: He is not in acute distress.     Appearance: He is well-developed.   HENT:      Head: Normocephalic.      Right Ear: External ear normal. There is no impacted cerumen.      Left Ear: External ear normal. There is no impacted cerumen.      Nose: No congestion or rhinorrhea.      Mouth/Throat:      Pharynx: No oropharyngeal exudate or posterior oropharyngeal erythema.   Eyes:      General: No scleral icterus.     Pupils: Pupils are equal, round, and reactive to light.   Neck:      Thyroid: No thyromegaly.      Trachea: No tracheal deviation.   Cardiovascular:      Rate and Rhythm: Normal rate and regular rhythm.      Heart sounds: Normal heart sounds. No murmur heard.     Comments: Bilateral dorsal pedis and posterior tibial pulses are 1+  Pulmonary:      Effort: Pulmonary effort is normal. No respiratory distress.      Breath sounds: Normal breath sounds.   Chest:      Chest wall: No tenderness.   Abdominal:      General: Bowel sounds are normal.      Palpations: Abdomen is soft. There is no mass.      Tenderness: There is no abdominal tenderness.   Musculoskeletal:         General: Normal range of motion.      Cervical back: Normal range of motion and neck supple. No rigidity.      Right lower leg: No edema.      Left lower leg: No edema.   Lymphadenopathy:      Cervical: No cervical " adenopathy.   Skin:     General: Skin is warm.      Findings: No erythema or rash.   Neurological:      Mental Status: He is alert and oriented to person, place, and time.      Cranial Nerves: No cranial nerve deficit.   Psychiatric:         Mood and Affect: Mood normal.         Behavior: Behavior normal.

## 2025-03-11 NOTE — ASSESSMENT & PLAN NOTE
Lab Results   Component Value Date    HGBA1C 6.6 (H) 10/30/2024     Continue with diabetic diet.  Recent hemoglobin A1c is 6.5.  Will continue to monitor

## 2025-03-11 NOTE — ASSESSMENT & PLAN NOTE
Since using Pletal his walking distance and increased significantly.  Managed by vascular surgery

## 2025-03-13 ENCOUNTER — TELEPHONE (OUTPATIENT)
Dept: ADMINISTRATIVE | Facility: OTHER | Age: 80
End: 2025-03-13

## 2025-03-13 DIAGNOSIS — E78.00 HYPERCHOLESTEROLEMIA: Primary | ICD-10-CM

## 2025-03-13 NOTE — TELEPHONE ENCOUNTER
----- Message from Priscilla MARTIN sent at 3/13/2025  7:34 AM EDT -----  Regarding: microalbumin creatinine  03/13/25 7:35 AM    Hello, our patient Adarsh Sheppard has had Urine Albumin/Creatinine Ratio completed/performed. Please assist in updating the patient chart by pulling a previous Electronic Medical Record (EMR) document. The previous EMR is scanned in Media The date of service is 2/28/25 PLEASE UPDATE IN HEALTH MAINT THANKS.    Thank you,  Priscilla Sims MA  [facility]IMARY [facility] [place]

## 2025-03-13 NOTE — TELEPHONE ENCOUNTER
----- Message from Priscilla MARTIN sent at 3/13/2025  7:24 AM EDT -----  Regarding: diabetic eye exam  03/13/25 7:25 AM    Hello, our patient Adarsh Sheppard has had Diabetic Eye Exam completed/performed. Please assist in updating the patient chart by pulling the document from the Media Tab. The date of service is 2/4/2025---please update in health maint.     Thank you,  Priscilla Sims MA  [facility] [place]

## 2025-03-13 NOTE — TELEPHONE ENCOUNTER
----- Message from Priscilla MARTIN sent at 3/13/2025  7:28 AM EDT -----  Regarding: hemoglobin A1C  03/13/25 7:31 AM    Hello, our patient Adarsh Sheppard has had Hemoglobin A1c completed/performed. Please assist in updating the patient chart by making an External outreach to [facility]  facility located scanned  in media . The date of service is 02/28/25 PLEASE UPDATE HEALTH MAINT THANKS.    Thank you,  Priscilla Sims MA  [facility] [place]

## 2025-03-13 NOTE — TELEPHONE ENCOUNTER
Reason for call:   [x] Refill   [] Prior Auth  [x] Other: Medication previously prescribed by Shane Vizcaino MD     Office:   [x] PCP/Provider - [facility] [place] - Shane Vizcaino MD   [] Specialty/Provider -     Medication: rosuvastatin (CRESTOR) 5 mg tablet     Dose/Frequency: Take 5 mg by mouth daily at bedtime,     Quantity: 90 tablet    Pharmacy: [address] [place] - [address] [facility]   Does the patient have enough for 3 days?   [x] Yes   [] No - Send as HP to POD    Mail Away Pharmacy   Does the patient have enough for 10 days?   [] Yes   [] No - Send as HP to POD

## 2025-03-13 NOTE — TELEPHONE ENCOUNTER
Upon review of the In Basket request we were able to locate, review, and update the patient chart as requested for Diabetic Eye Exam, Hemoglobin A1c, and Microalbumin Creatinine Urine Ratio OR Albumin Creatinine Urine Ratio .    Any additional questions or concerns should be emailed to the Practice Liaisons via the appropriate education email address, please do not reply via In Basket.    Thank you  Chi Scott MA   [facility]

## 2025-03-14 RX ORDER — ROSUVASTATIN CALCIUM 5 MG/1
5 TABLET, COATED ORAL
Qty: 90 TABLET | Refills: 1 | Status: SHIPPED | OUTPATIENT
Start: 2025-03-14

## 2025-03-31 ENCOUNTER — HOSPITAL ENCOUNTER (OUTPATIENT)
Dept: NON INVASIVE DIAGNOSTICS | Facility: CLINIC | Age: 80
Discharge: HOME | End: 2025-03-31
Payer: COMMERCIAL

## 2025-03-31 DIAGNOSIS — I73.9 CLAUDICATION (HCC): ICD-10-CM

## 2025-03-31 PROCEDURE — 93922 UPR/L XTREMITY ART 2 LEVELS: CPT | Performed by: SURGERY

## 2025-03-31 PROCEDURE — 93925 LOWER EXTREMITY STUDY: CPT | Performed by: SURGERY

## 2025-03-31 PROCEDURE — 93923 UPR/LXTR ART STDY 3+ LVLS: CPT

## 2025-03-31 PROCEDURE — 93925 LOWER EXTREMITY STUDY: CPT

## 2025-04-02 ENCOUNTER — TELEPHONE (OUTPATIENT)
Dept: VASCULAR SURGERY | Facility: CLINIC | Age: 80
End: 2025-04-02

## 2025-04-02 NOTE — TELEPHONE ENCOUNTER
Attempted to contact patient to schedule appointment(s) listed below.  Requested patient call [phone] to schedule appointment(s).    Patient's appointment(s) are due now.    Dopplers  [] Abdominal Aorta Iliac (AOIL)  [] Carotid (CV)   [] Celiac and/or Mesenteric  [] Endovascular Aortic Repair (EVAR)   [] Hemodialysis Access (HD)   [] Lower Limb Arterial (MARCIE)  [] Lower Limb Venous (LEV)  [] Lower Limb Venous Duplex with Reflux (LEVDR)  [] Renal Artery  [] Upper Limb Arterial (UEA)    [] Upper Limb Venous (UEV)              [] HEATHER and Waveform analysis     Advanced Imaging   [] CTA head/neck    [] CTA abdomen    [] CTA abdomen & pelvis    [] CT abdomen with/ without contrast  [] CT abdomen with contrast  [] CT abdomen without contrast    [] CT abdomen & pelvis with/ without contrast  [] CT abdomen & pelvis with contrast  [] CT abdomen & pelvis without contrast    Office Visit   [] New patient, patient last seen over 3 years ago  [] Risk factor modification (RFM)   [x] Follow up   [] Lost to follow up (LTFU)   Called patient & LMOM to schedule 6 mo ov fu w/MARTHA/JERRY JACK 3/31/25

## 2025-05-09 ENCOUNTER — RA CDI HCC (OUTPATIENT)
Dept: OTHER | Facility: HOSPITAL | Age: 80
End: 2025-05-09

## 2025-05-09 NOTE — PROGRESS NOTES
HCC coding opportunities          Chart Reviewed number of suggestions sent to Provider: 1  E11.51     Patients Insurance     Medicare Insurance: [employer]age

## 2025-05-12 DIAGNOSIS — I10 BENIGN ESSENTIAL HTN: ICD-10-CM

## 2025-05-12 RX ORDER — AMLODIPINE BESYLATE 5 MG/1
5 TABLET ORAL DAILY
Qty: 90 TABLET | Refills: 1 | Status: SHIPPED | OUTPATIENT
Start: 2025-05-12

## 2025-05-15 ENCOUNTER — TELEPHONE (OUTPATIENT)
Dept: VASCULAR SURGERY | Facility: CLINIC | Age: 80
End: 2025-05-15

## 2025-05-16 NOTE — TELEPHONE ENCOUNTER
Spoke with pt and he stated he will call back to schedule, wife is having surgery in July and that is DSC's next available for RR

## 2025-05-22 ENCOUNTER — RESULTS FOLLOW-UP (OUTPATIENT)
Dept: INTERNAL MEDICINE CLINIC | Age: 80
End: 2025-05-22

## 2025-05-22 ENCOUNTER — TELEPHONE (OUTPATIENT)
Dept: CARDIOLOGY CLINIC | Facility: CLINIC | Age: 80
End: 2025-05-22

## 2025-05-22 ENCOUNTER — LAB (OUTPATIENT)
Dept: LAB | Facility: HOSPITAL | Age: 80
End: 2025-05-22
Payer: COMMERCIAL

## 2025-05-22 DIAGNOSIS — E78.00 HYPERCHOLESTEROLEMIA: ICD-10-CM

## 2025-05-22 DIAGNOSIS — E11.22 TYPE 2 DIABETES MELLITUS WITH STAGE 3A CHRONIC KIDNEY DISEASE, WITHOUT LONG-TERM CURRENT USE OF INSULIN (HCC): ICD-10-CM

## 2025-05-22 DIAGNOSIS — D68.59 THROMBOPHILIA (HCC): ICD-10-CM

## 2025-05-22 DIAGNOSIS — N18.31 STAGE 3A CHRONIC KIDNEY DISEASE (HCC): ICD-10-CM

## 2025-05-22 DIAGNOSIS — N18.31 TYPE 2 DIABETES MELLITUS WITH STAGE 3A CHRONIC KIDNEY DISEASE, WITHOUT LONG-TERM CURRENT USE OF INSULIN (HCC): ICD-10-CM

## 2025-05-22 LAB
ALBUMIN SERPL BCG-MCNC: 4.2 G/DL (ref 3.5–5)
ALP SERPL-CCNC: 43 U/L (ref 34–104)
ALT SERPL W P-5'-P-CCNC: 8 U/L (ref 7–52)
ANION GAP SERPL CALCULATED.3IONS-SCNC: 5 MMOL/L (ref 4–13)
AST SERPL W P-5'-P-CCNC: 12 U/L (ref 13–39)
BASOPHILS # BLD AUTO: 0.03 THOUSANDS/ÂΜL (ref 0–0.1)
BASOPHILS NFR BLD AUTO: 0 % (ref 0–1)
BILIRUB SERPL-MCNC: 0.52 MG/DL (ref 0.2–1)
BUN SERPL-MCNC: 17 MG/DL (ref 5–25)
CALCIUM SERPL-MCNC: 8.8 MG/DL (ref 8.4–10.2)
CHLORIDE SERPL-SCNC: 108 MMOL/L (ref 96–108)
CHOLEST SERPL-MCNC: 105 MG/DL (ref ?–200)
CO2 SERPL-SCNC: 27 MMOL/L (ref 21–32)
CREAT SERPL-MCNC: 1.31 MG/DL (ref 0.6–1.3)
CREAT UR-MCNC: 105.1 MG/DL
EOSINOPHIL # BLD AUTO: 0.43 THOUSAND/ÂΜL (ref 0–0.61)
EOSINOPHIL NFR BLD AUTO: 4 % (ref 0–6)
ERYTHROCYTE [DISTWIDTH] IN BLOOD BY AUTOMATED COUNT: 16.4 % (ref 11.6–15.1)
EST. AVERAGE GLUCOSE BLD GHB EST-MCNC: 146 MG/DL
GFR SERPL CREATININE-BSD FRML MDRD: 50 ML/MIN/1.73SQ M
GLUCOSE P FAST SERPL-MCNC: 122 MG/DL (ref 65–99)
HBA1C MFR BLD: 6.7 %
HCT VFR BLD AUTO: 42.6 % (ref 36.5–49.3)
HDLC SERPL-MCNC: 44 MG/DL
HGB BLD-MCNC: 13.9 G/DL (ref 12–17)
IMM GRANULOCYTES # BLD AUTO: 0.14 THOUSAND/UL (ref 0–0.2)
IMM GRANULOCYTES NFR BLD AUTO: 1 % (ref 0–2)
LDLC SERPL CALC-MCNC: 37 MG/DL (ref 0–100)
LYMPHOCYTES # BLD AUTO: 2.06 THOUSANDS/ÂΜL (ref 0.6–4.47)
LYMPHOCYTES NFR BLD AUTO: 17 % (ref 14–44)
MCH RBC QN AUTO: 29.6 PG (ref 26.8–34.3)
MCHC RBC AUTO-ENTMCNC: 32.6 G/DL (ref 31.4–37.4)
MCV RBC AUTO: 91 FL (ref 82–98)
MICROALBUMIN UR-MCNC: <7 MG/L
MONOCYTES # BLD AUTO: 0.84 THOUSAND/ÂΜL (ref 0.17–1.22)
MONOCYTES NFR BLD AUTO: 7 % (ref 4–12)
NEUTROPHILS # BLD AUTO: 8.51 THOUSANDS/ÂΜL (ref 1.85–7.62)
NEUTS SEG NFR BLD AUTO: 71 % (ref 43–75)
NRBC BLD AUTO-RTO: 0 /100 WBCS
PLATELET # BLD AUTO: 1138 THOUSANDS/UL (ref 149–390)
PMV BLD AUTO: 10.1 FL (ref 8.9–12.7)
POTASSIUM SERPL-SCNC: 4.2 MMOL/L (ref 3.5–5.3)
PROT SERPL-MCNC: 6.5 G/DL (ref 6.4–8.4)
RBC # BLD AUTO: 4.7 MILLION/UL (ref 3.88–5.62)
SODIUM SERPL-SCNC: 140 MMOL/L (ref 135–147)
TRIGL SERPL-MCNC: 118 MG/DL (ref ?–150)
WBC # BLD AUTO: 12.01 THOUSAND/UL (ref 4.31–10.16)

## 2025-05-22 PROCEDURE — 80061 LIPID PANEL: CPT

## 2025-05-22 PROCEDURE — 80053 COMPREHEN METABOLIC PANEL: CPT

## 2025-05-22 PROCEDURE — 85025 COMPLETE CBC W/AUTO DIFF WBC: CPT

## 2025-05-22 PROCEDURE — 83036 HEMOGLOBIN GLYCOSYLATED A1C: CPT

## 2025-05-22 PROCEDURE — 82043 UR ALBUMIN QUANTITATIVE: CPT

## 2025-05-22 PROCEDURE — 82570 ASSAY OF URINE CREATININE: CPT

## 2025-05-22 PROCEDURE — 36415 COLL VENOUS BLD VENIPUNCTURE: CPT

## 2025-05-22 NOTE — TELEPHONE ENCOUNTER
----- Message from Mandy DUMONT sent at 5/22/2025  1:05 PM EDT -----    ----- Message -----  From: Melba Cota  Sent: 5/22/2025   1:04 PM EDT  To: Cardiology [place] [facility]      ----- Message -----  From: Jake Gottlieb DO  Sent: 5/22/2025  12:16 PM EDT  To: Cardiology [place]    Can you please let the patient know that he has a very high platelet level over thousand he needs to talk to Dr. Higgins in hematology who has been following this in the past,  Or start with PCP

## 2025-05-22 NOTE — TELEPHONE ENCOUNTER
Spoke with pt, advised of Dr. Gottlieb's message.   Pt has a f/u appt with PCP on 05/27 and with Dr Dial in 08/28. Pt appreciated the call.

## 2025-05-22 NOTE — RESULT ENCOUNTER NOTE
Received critical alert regarding pt's platelet count of 1,138 which is higher than prior baselines in 600s. Pt previously seen by Dr. Dial's office in 2024 at which time pt requested to only follow with PCP Dr. Vizcaino. After discussion with Dr. Vizcaino, pt was called and encouraged to re-establish care with hematology. Pt agrees to call to establish care. Office information provided per request in BLOVES.      --------------------------------------------------------    Adarsh     As discussed on the phone, Dr. Dial's office information:   [facility] [place] [phone]     Please call our office if any issues.    Alondra Gonzalez, DO

## 2025-05-27 ENCOUNTER — OFFICE VISIT (OUTPATIENT)
Dept: INTERNAL MEDICINE CLINIC | Age: 80
End: 2025-05-27
Payer: COMMERCIAL

## 2025-05-27 VITALS
BODY MASS INDEX: 32.6 KG/M2 | OXYGEN SATURATION: 95 % | TEMPERATURE: 97.8 F | WEIGHT: 184 LBS | HEIGHT: 63 IN | HEART RATE: 57 BPM | DIASTOLIC BLOOD PRESSURE: 84 MMHG | SYSTOLIC BLOOD PRESSURE: 126 MMHG

## 2025-05-27 DIAGNOSIS — E11.22 TYPE 2 DIABETES MELLITUS WITH STAGE 3A CHRONIC KIDNEY DISEASE, WITHOUT LONG-TERM CURRENT USE OF INSULIN (HCC): ICD-10-CM

## 2025-05-27 DIAGNOSIS — D47.3 ESSENTIAL THROMBOCYTHEMIA (HCC): ICD-10-CM

## 2025-05-27 DIAGNOSIS — I25.10 CORONARY ARTERY DISEASE INVOLVING NATIVE CORONARY ARTERY OF NATIVE HEART WITHOUT ANGINA PECTORIS: ICD-10-CM

## 2025-05-27 DIAGNOSIS — J42 CHRONIC BRONCHITIS, UNSPECIFIED CHRONIC BRONCHITIS TYPE (HCC): ICD-10-CM

## 2025-05-27 DIAGNOSIS — D47.1 CHRONIC MYELOPROLIFERATIVE DISEASE (HCC): ICD-10-CM

## 2025-05-27 DIAGNOSIS — I73.9 PAD (PERIPHERAL ARTERY DISEASE) (HCC): Primary | ICD-10-CM

## 2025-05-27 DIAGNOSIS — N18.31 STAGE 3A CHRONIC KIDNEY DISEASE (HCC): ICD-10-CM

## 2025-05-27 DIAGNOSIS — N18.31 TYPE 2 DIABETES MELLITUS WITH STAGE 3A CHRONIC KIDNEY DISEASE, WITHOUT LONG-TERM CURRENT USE OF INSULIN (HCC): ICD-10-CM

## 2025-05-27 DIAGNOSIS — E78.00 HYPERCHOLESTEROLEMIA: ICD-10-CM

## 2025-05-27 PROCEDURE — G2211 COMPLEX E/M VISIT ADD ON: HCPCS | Performed by: INTERNAL MEDICINE

## 2025-05-27 PROCEDURE — 99214 OFFICE O/P EST MOD 30 MIN: CPT | Performed by: INTERNAL MEDICINE

## 2025-05-27 RX ORDER — ANAGRELIDE 0.5 MG/1
0.5 CAPSULE ORAL 2 TIMES DAILY
Qty: 180 CAPSULE | Refills: 0 | Status: SHIPPED | OUTPATIENT
Start: 2025-05-27

## 2025-05-27 NOTE — ASSESSMENT & PLAN NOTE
Lab Results   Component Value Date    EGFR 50 05/22/2025    EGFR 48 (L) 10/30/2024    EGFR 43 (L) 10/09/2024    CREATININE 1.31 (H) 05/22/2025    CREATININE 1.47 (H) 10/30/2024    CREATININE 1.59 (H) 10/09/2024     Renal function stable.  Continue with adequate oral hydration and to avoid nephrotoxic meds.  Will continue to monitor  Orders:    Comprehensive metabolic panel; Future

## 2025-05-27 NOTE — ASSESSMENT & PLAN NOTE
Advised to follow-up with hematology/oncology and patient does have appointment in August 2025

## 2025-05-27 NOTE — ASSESSMENT & PLAN NOTE
Lab Results   Component Value Date    HGBA1C 6.7 (H) 05/22/2025     Continue with strict diabetic diet.  And will continue to monitor.  Presently there is a significant form to improve patient's diet  Orders:    Hemoglobin A1C; Future

## 2025-05-27 NOTE — PATIENT INSTRUCTIONS
"Patient Education     Type 2 diabetes   The Basics   Written by the doctors and editors at [facility]   What is type 2 diabetes? -- This is a disorder that disrupts the way the body uses sugar. It is sometimes called type 2 diabetes mellitus.  All of the cells in the body need sugar to work normally. Sugar gets into the cells with the help of a hormone called insulin. Insulin is made by the pancreas, an organ in the belly. If there is not enough insulin, or if cells in the body don't respond normally to insulin, sugar builds up in the blood. That is what happens to people with diabetes.  There are 2 different types of diabetes:   In type 1 diabetes, the pancreas makes little or no insulin.   In type 2 diabetes, the pancreas still makes some insulin, but the cells in the body stop responding normally. Eventually, the pancreas cannot make enough insulin to keep up.  Having excess body weight or obesity increases a person's risk of developing type 2 diabetes. But people without excess body weight can get diabetes, too.  What are the symptoms of type 2 diabetes? -- Type 2 diabetes usually causes no symptoms. When symptoms do happen, they include:   Needing to urinate often   Intense thirst   Blurry vision  Can diabetes lead to other health problems? -- Yes. Type 2 diabetes might not make you feel sick. But if it is not managed, it can lead to serious problems over time, such as:   Heart attacks   Strokes   Kidney disease   Vision problems (or even blindness)   Pain or loss of feeling in the hands and feet   Needing to have fingers, toes, or other body parts removed (amputated)  How do I know if I have type 2 diabetes? -- Your doctor or nurse can do a blood test. There are 2 tests that can be used for this. Both involve measuring the amount of sugar in your blood, called your \"blood sugar\" or \"blood glucose\":   One of the tests measures your blood sugar at the time the blood sample is taken. This test is done in the " "morning. You can't eat or drink anything except water for at least 8 hours before the test.   The other test shows what your average blood sugar has been for the past 2 to 3 months. This blood test is called \"hemoglobin A1C\" or just \"A1C.\" It can be checked at any time of the day, even if you have recently eaten.  How is type 2 diabetes treated? -- The goals of treatment are to manage your blood sugar and lower the risk of future problems that can happen in people with diabetes.  Treatment might include:   Lifestyle changes - This is an important part of managing diabetes. It includes eating healthy foods and getting plenty of physical activity.   Medicines - There are a few medicines that help lower blood sugar. Some people need to take pills that help the body make more insulin or that help insulin do its job. Others need insulin shots.  Depending on what medicines you take, you might need to check your blood sugar regularly at home. But not everyone with type 2 diabetes needs to do this. Your doctor or nurse will tell you if you should be checking your blood sugar, and when and how to do this.  Sometimes, people with type 2 diabetes also need medicines to help prevent problems caused by the disease. For instance, medicines used to lower blood pressure can reduce the chances of a heart attack or stroke.   General medical care - It's also important to take care of other areas of your health. This includes watching your blood pressure and cholesterol levels. You should also get certain vaccines, such as vaccines to protect against the flu and coronavirus disease 2019 (\"COVID-19\"). Some people also need a vaccine to prevent pneumonia.  Can type 2 diabetes be prevented? -- Yes. To lower your chances of getting type 2 diabetes, the most important thing you can do is eat a healthy diet and get plenty of physical activity. This can help you lose weight if you are overweight. But eating well and being active are also good " for your overall health. Even gentle activity, like walking, has benefits.  If you smoke, quitting can also lower your risk of type 2 diabetes. Quitting smoking can be difficult, but your doctor or nurse can help.  All topics are updated as new evidence becomes available and our peer review process is complete.  This topic retrieved from [employer] on: Apr 24, 2024.  Topic 77247 Version 23.0  Release: 32.3.2 - C32.113  © 2024 UpToDate, Inc. and/or its affiliates. All rights reserved.  Consumer Information Use and Disclaimer   Disclaimer: This generalized information is a limited summary of diagnosis, treatment, and/or medication information. It is not meant to be comprehensive and should be used as a tool to help the user understand and/or assess potential diagnostic and treatment options. It does NOT include all information about conditions, treatments, medications, side effects, or risks that may apply to a specific patient. It is not intended to be medical advice or a substitute for the medical advice, diagnosis, or treatment of a health care provider based on the health care provider's examination and assessment of a patient's specific and unique circumstances. Patients must speak with a health care provider for complete information about their health, medical questions, and treatment options, including any risks or benefits regarding use of medications. This information does not endorse any treatments or medications as safe, effective, or approved for treating a specific patient. UpToDate, Inc. and its affiliates disclaim any warranty or liability relating to this information or the use thereof.The use of this information is governed by the Terms of Use, available at https://www.wolterskluwer.com/en/know/clinical-effectiveness-terms. 2024© UpToDate, Inc. and its affiliates and/or licensors. All rights reserved.  Copyright   © 2024 UpToDate, Inc. and/or its affiliates. All rights reserved.

## 2025-05-27 NOTE — ASSESSMENT & PLAN NOTE
Lipid profile is in acceptable range.  Continue with present dose of statin along with low-fat diet

## 2025-05-27 NOTE — ASSESSMENT & PLAN NOTE
No chest pain shortness of breath.  Continue with present regimen also followed by cardiologist

## 2025-05-27 NOTE — ASSESSMENT & PLAN NOTE
Patient last follow-up with hematology and he is not taking his anagrelide 0.5 mg twice a day for the last couple of months.  I will refill the prescription and advised to follow-up with his hematologist and he does have appointment in August 2025.  Orders:    CBC; Future    anagrelide (AGRYLIN) 0.5 MG capsule; Take 1 capsule (0.5 mg total) by mouth 2 (two) times a day

## 2025-05-27 NOTE — PROGRESS NOTES
Name: Adarsh Sheppard      : 1945      MRN: [id number]  Encounter Provider: Shane Vizcaino MD  Encounter Date: 2025   Encounter department: [facility] [place]  :  Assessment & Plan  PAD (peripheral artery disease) (HCC)  Continue with Pletal 50 mg twice a day.  Also managed by vascular surgery       Chronic bronchitis, unspecified chronic bronchitis type (HCC)  Continue with present inhaler doing well       Type 2 diabetes mellitus with stage 3a chronic kidney disease, without long-term current use of insulin (HCC)    Lab Results   Component Value Date    HGBA1C 6.7 (H) 2025     Continue with strict diabetic diet.  And will continue to monitor.  Presently there is a significant form to improve patient's diet  Orders:    Hemoglobin A1C; Future    Chronic myeloproliferative disease (HCC)  Advised to follow-up with hematology/oncology and patient does have appointment in 2025       Essential thrombocythemia (HCC)  Patient last follow-up with hematology and he is not taking his anagrelide 0.5 mg twice a day for the last couple of months.  I will refill the prescription and advised to follow-up with his hematologist and he does have appointment in 2025.  Orders:    CBC; Future    anagrelide (AGRYLIN) 0.5 MG capsule; Take 1 capsule (0.5 mg total) by mouth 2 (two) times a day    Stage 3a chronic kidney disease (HCC)  Lab Results   Component Value Date    EGFR 50 2025    EGFR 48 (L) 10/30/2024    EGFR 43 (L) 10/09/2024    CREATININE 1.31 (H) 2025    CREATININE 1.47 (H) 10/30/2024    CREATININE 1.59 (H) 10/09/2024     Renal function stable.  Continue with adequate oral hydration and to avoid nephrotoxic meds.  Will continue to monitor  Orders:    Comprehensive metabolic panel; Future    Hypercholesterolemia  Lipid profile is in acceptable range.  Continue with present dose of statin along with low-fat diet       Coronary artery disease involving native  "coronary artery of native heart without angina pectoris  No chest pain shortness of breath.  Continue with present regimen also followed by cardiologist             Depression Screening and Follow-up Plan:   Clincally patient does not have depression. No treatment is required.       History of Present Illness   HPI  Review of Systems    Objective   /84 (BP Location: Left arm, Patient Position: Sitting, Cuff Size: Large)   Pulse 57   Temp 97.8 °F (36.6 °C) (Temporal)   Ht 5' 3\" (1.6 m)   Wt 83.5 kg (184 lb)   SpO2 95%   BMI 32.59 kg/m²      Physical Exam    "

## 2025-06-05 ENCOUNTER — REMOTE DEVICE CLINIC VISIT (OUTPATIENT)
Dept: CARDIOLOGY CLINIC | Facility: CLINIC | Age: 80
End: 2025-06-05
Payer: COMMERCIAL

## 2025-06-05 DIAGNOSIS — G45.9 TRANSIENT ISCHEMIC ATTACK: Primary | ICD-10-CM

## 2025-06-05 PROCEDURE — 93298 REM INTERROG DEV EVAL SCRMS: CPT | Performed by: INTERNAL MEDICINE

## 2025-06-05 NOTE — PROGRESS NOTES
"[facility] [id number] ILR/ACTIVE SYSTEM IS MRI CONDITIONAL   MERLIN TRANSMISSION: LOOP RECORDER. PRESENTING RHYTHM NSR @ 62 BPM. BATTERY STATUS \"OK.\" 1 PT ACTIVATED EPISODE PREVIOUSLY ADDRESSED IN ALERT. 2 JOEL EPISODES W/ EGRAMS SHOWING UNDERSENSING. NO NEW PATIENT OR NEW DEVICE ACTIVATED EPISODES. NORMAL DEVICE FUNCTION. DL   " never

## 2025-06-06 ENCOUNTER — RESULTS FOLLOW-UP (OUTPATIENT)
Dept: NON INVASIVE DIAGNOSTICS | Facility: HOSPITAL | Age: 80
End: 2025-06-06

## 2025-07-22 ENCOUNTER — OFFICE VISIT (OUTPATIENT)
Dept: VASCULAR SURGERY | Facility: CLINIC | Age: 80
End: 2025-07-22
Payer: COMMERCIAL

## 2025-07-22 VITALS
BODY MASS INDEX: 32.6 KG/M2 | WEIGHT: 184 LBS | OXYGEN SATURATION: 95 % | DIASTOLIC BLOOD PRESSURE: 68 MMHG | HEIGHT: 63 IN | HEART RATE: 70 BPM | SYSTOLIC BLOOD PRESSURE: 126 MMHG

## 2025-07-22 DIAGNOSIS — I73.9 PERIPHERAL ARTERIAL DISEASE (HCC): Primary | ICD-10-CM

## 2025-07-22 DIAGNOSIS — I65.23 CAROTID STENOSIS, ASYMPTOMATIC, BILATERAL: ICD-10-CM

## 2025-07-22 PROCEDURE — 99214 OFFICE O/P EST MOD 30 MIN: CPT | Performed by: STUDENT IN AN ORGANIZED HEALTH CARE EDUCATION/TRAINING PROGRAM

## 2025-07-22 NOTE — PROGRESS NOTES
Vascular Surgery New Patient Visit  Date: 07/22/25      Assessment:  Adarsh Sheppard is a 80 y.o. male with history of PAD. Pletal was started at his last visit and he reports improvement in his symptoms such that he is walking a mile on the treadmill multiple times per week. Will plan to repeat his carotid duplex at our next visit to continue surveillance.       Plan:  -Continue exercising  -Continue ASA and Crestor for atherosclerotic disease  -Continue pletal 50mg bid provided today  -RTC 1yr w/ LEAD and carotid duplex    Diagnoses and all orders for this visit:    Peripheral arterial disease (HCC)  -     VAS ARTERIAL DUPLEX- LOWER LIMB BILATERAL; Future  -     VAS carotid complete study; Future    Carotid stenosis, asymptomatic, bilateral  -     VAS ARTERIAL DUPLEX- LOWER LIMB BILATERAL; Future  -     VAS carotid complete study; Future           Subjective:     HPI:  Adarsh Sheppard is a 80 y.o. male with PMH of COPD (not on home O2), MI, stroke, and HTN. Echo from 8/2024 at [facility] showed EF 55-60%, no AS or significant valvular regurgitation.  He returns today for surveillance of his PAD.    He was given a prescription for pletal (50mg BID). He reports this has improved his symptoms significantly such that he has returned to walking a mile on the treadmill multiple times per week. He notes a clear worsening of his symptoms when he does not take the medication. No rest pain or wounds. His duplex results are outlined below. No significant changes compared to prior.     We also discussed his last carotid duplex, which was last done at [facility] in 8/2024, report stated <50% ICA stenosis bilaterally. This is consistent w/ our study from 2023. Denies history of stroke/TIA/amaurosis since he was seen last.     He is currently taking ASA 81 and Crestor.    ABIs:  Date 3/31/25 8/9/24   Right 0.72, TP 52 0.8, TP 51    Elevated velocities noted in the SFA (>75% stenosis) and tibials   Left 1.07, TP 92 1.13, TP 72  "          Objective:    ROS:  All systems were reviewed and are negative except those mentioned in HPI and below.      Vitals: /68 (BP Location: Right arm, Patient Position: Sitting, Cuff Size: Standard)   Pulse 70   Ht 5' 3\" (1.6 m)   Wt 83.5 kg (184 lb)   SpO2 95%   BMI 32.59 kg/m²      General: male appears stated age, no apparent distress, alert and oriented   HEENT: normocephalic, atraumatic   Cardiovascular: hemodynamically stable   Chest/Lungs: no increased work of breathing, chest rise equal bilaterally   Abdomen: Soft, ND, NT, no pulsatile masses   Extremities: Bilateral DP/PT pulses.  Qualitatively, pedal pulses on the right are slightly weaker than the left.   Skin: warm and dry   Neuro: no gross deficits      Medications:  Current Outpatient Medications   Medication Sig Dispense Refill    allopurinol (ZYLOPRIM) 100 mg tablet Take 1 tablet (100 mg total) by mouth daily 90 tablet 1    amLODIPine (NORVASC) 5 mg tablet Take 1 tablet (5 mg total) by mouth daily 90 tablet 1    anagrelide (AGRYLIN) 0.5 MG capsule Take 1 capsule (0.5 mg total) by mouth 2 (two) times a day 180 capsule 0    aspirin 81 mg chewable tablet Chew 1 tablet (81 mg total) daily      carvedilol (COREG) 25 mg tablet Take 1 tablet (25 mg total) by mouth 2 (two) times a day with meals (Patient taking differently: Take 25 mg by mouth in the morning) 180 tablet 1    cilostazol (PLETAL) 50 mg tablet Take 1 tablet (50 mg total) by mouth 2 (two) times a day 60 tablet 11    rosuvastatin (CRESTOR) 5 mg tablet Take 1 tablet (5 mg total) by mouth daily at bedtime 90 tablet 1     No current facility-administered medications for this visit.       Allergies:  Allergies   Allergen Reactions    Other      [place] trees    Hydroxyurea Rash        PMH:  Past Medical History:   Diagnosis Date    Actinic keratosis     RESOLVED: [id number]    Adolescent idiopathic scoliosis of thoracolumbar region 04/12/2018    Allergic     Anxiety     Arthritis     Basal " cell carcinoma of back     RESOLVED: [id number]    Basal cell carcinoma of skin of upper extremity, including shoulder     BCC (basal cell carcinoma), trunk     BCE (basal cell epithelioma), trunk     RESOLVED: [id number]    Benign neoplasm of skin of face     Chronic kidney disease     COPD (chronic obstructive pulmonary disease) (HCC)     Coronary artery disease involving native coronary artery of native heart without angina pectoris 10/16/2018    Depression 10/06/2015    Diabetes mellitus (HCC)     Gastroesophageal reflux disease without esophagitis 04/21/2017    GERD (gastroesophageal reflux disease)     Heart disease     Hypertension     Myocardial infarction (HCC) 2005    Neoplasm of uncertain behavior of skin     Nonmelanoma skin cancer     LAST ASSESSED: [id number]    Pneumonia     Scoliosis     Seborrheic keratosis     RESOLVED: [id number]    Stroke (HCC)     TIA        PSH:  Past Surgical History:   Procedure Laterality Date    ANAL FISTULOTOMY  06/13/2011    DR. SANCHEZ    BACK SURGERY      BASAL CELL CARCINOMA EXCISION  11/2023    back    CARDIAC ELECTROPHYSIOLOGY PROCEDURE N/A 11/06/2024    Procedure: Cardiac loop recorder implant;  Surgeon: Tucker Roper MD;  Location: [facility];  Service: Cardiology    COLONOSCOPY  2016    CORONARY ARTERY BYPASS GRAFT  05/17/2005    TRIPLE; DR. PETTY SURGEON [facility]     HEMORRHOID SURGERY  10/2009    DR. MARRERO    IR BIOPSY BONE MARROW  07/24/2023    JOINT REPLACEMENT      shoulder replacement    KNEE SURGERY      CO SURG TX ANAL FISTULA INTERSPHINCTERIC N/A 06/07/2024    Procedure: ANAL FISTULOTOMY;  Surgeon: JOSE ALFREDO Ann MD;  [facility];  Service: Colorectal    SHOULDER SURGERY      JOINT REPLACEMENT; DR. MONTGOMERY    SPINE SURGERY  12/2020    spine fusion    UPPER GASTROINTESTINAL ENDOSCOPY          FHx:  Family History   Problem Relation Name Age of Onset    Other Mother          BRAIN TUMOR    No Known Problems Father       Vascular Disease Brother Ronen     Cancer Brother Ronen     Dementia Brother Ronen     Heart disease Brother Ronen     COPD Brother Ronen     Prostate cancer Brother Ronen     Substance Abuse Brother James     Drug abuse Brother James     Cancer Brother Ronen Silver         prostate    Heart disease Brother Ronen     Dementia Brother Ronen     COPD Brother Ronen     Prostate cancer Brother Ronen     Cancer Brother Ronen     Vascular Disease Brother Ronen         By-pass surgery        SHx:  Social History     Socioeconomic History    Marital status: /Civil Union     Spouse name: Not on file    Number of children: Not on file    Years of education: Not on file    Highest education level: Not on file   Occupational History    Not on file   Tobacco Use    Smoking status: Light Smoker     Types: Cigars    Smokeless tobacco: Never    Tobacco comments:     one cigar once in awhile   Vaping Use    Vaping status: Never Used   Substance and Sexual Activity    Alcohol use: Yes     Alcohol/week: 1.0 standard drink of alcohol     Types: 1 Glasses of wine per week     Comment: occasionally    Drug use: No    Sexual activity: Not Currently     Partners: Female     Birth control/protection: Male Sterilization   Other Topics Concern    Not on file   Social History Narrative    Not on file     Social Drivers of Health     Financial Resource Strain: Low Risk  (8/27/2024)    Received from [facility]    Overall Financial Resource Strain (CARDIA)     Difficulty of Paying Living Expenses: Not very hard   Food Insecurity: No Food Insecurity (12/5/2024)    Nursing - Inadequate Food Risk Classification     Worried About Running Out of Food in the Last Year: Never true     Ran Out of Food in the Last Year: Never true     Ran Out of Food in the Last Year: Not on file   Transportation Needs: No Transportation Needs (12/5/2024)    PRAPARE - Transportation     Lack of Transportation (Medical): No     Lack of Transportation  (Non-Medical): No   Physical Activity: Not on file   Stress: Not on file   Social Connections: Not on file   Intimate Partner Violence: Not At Risk (8/27/2024)    Received from [facility]    Humiliation, Afraid, Rape, and Kick questionnaire     Within the last year, have you been afraid of your partner or ex-partner?: No     Within the last year, have you been humiliated or emotionally abused in other ways by your partner or ex-partner?: No     Within the last year, have you been kicked, hit, slapped, or otherwise physically hurt by your partner or ex-partner?: No     Within the last year, have you been raped or forced to have any kind of sexual activity by your partner or ex-partner?: No   Housing Stability: Low Risk  (12/5/2024)    Housing Stability Vital Sign     Unable to Pay for Housing in the Last Year: No     Number of Times Moved in the Last Year: 0     Homeless in the Last Year: No

## 2025-08-19 ENCOUNTER — REMOTE DEVICE CLINIC VISIT (OUTPATIENT)
Dept: CARDIOLOGY CLINIC | Facility: CLINIC | Age: 80
End: 2025-08-19
Payer: COMMERCIAL

## 2025-08-19 DIAGNOSIS — G45.9 TRANSIENT ISCHEMIC ATTACK: Primary | ICD-10-CM

## 2025-08-19 PROCEDURE — 93298 REM INTERROG DEV EVAL SCRMS: CPT | Performed by: INTERNAL MEDICINE

## 2025-08-20 ENCOUNTER — TELEPHONE (OUTPATIENT)
Dept: OTHER | Facility: OTHER | Age: 80
End: 2025-08-20

## 2025-08-20 LAB
ALBUMIN SERPL-MCNC: 4.3 G/DL (ref 3.7–4.7)
ALP SERPL-CCNC: 45 IU/L (ref 44–121)
ALT SERPL-CCNC: 16 IU/L (ref 0–44)
AST SERPL-CCNC: 19 IU/L (ref 0–40)
BILIRUB SERPL-MCNC: 0.5 MG/DL (ref 0–1.2)
BUN SERPL-MCNC: 20 MG/DL (ref 8–27)
BUN/CREAT SERPL: 12 (ref 10–24)
CALCIUM SERPL-MCNC: 9.4 MG/DL (ref 8.6–10.2)
CHLORIDE SERPL-SCNC: 105 MMOL/L (ref 96–106)
CO2 SERPL-SCNC: 19 MMOL/L (ref 20–29)
CREAT SERPL-MCNC: 1.63 MG/DL (ref 0.76–1.27)
EGFR: 42 ML/MIN/1.73
ERYTHROCYTE [DISTWIDTH] IN BLOOD BY AUTOMATED COUNT: 15.7 % (ref 11.6–15.4)
EST. AVERAGE GLUCOSE BLD GHB EST-MCNC: 151 MG/DL
GLOBULIN SER-MCNC: 1.9 G/DL (ref 1.5–4.5)
GLUCOSE SERPL-MCNC: 119 MG/DL (ref 70–99)
HBA1C MFR BLD: 6.9 % (ref 4.8–5.6)
HCT VFR BLD AUTO: 41.9 % (ref 37.5–51)
HGB BLD-MCNC: 14 G/DL (ref 13–17.7)
MCH RBC QN AUTO: 29.9 PG (ref 26.6–33)
MCHC RBC AUTO-ENTMCNC: 33.4 G/DL (ref 31.5–35.7)
MCV RBC AUTO: 90 FL (ref 79–97)
PLATELET # BLD AUTO: 1046 X10E3/UL (ref 150–450)
POTASSIUM SERPL-SCNC: 5.5 MMOL/L (ref 3.5–5.2)
PROT SERPL-MCNC: 6.2 G/DL (ref 6–8.5)
RBC # BLD AUTO: 4.68 X10E6/UL (ref 4.14–5.8)
SODIUM SERPL-SCNC: 141 MMOL/L (ref 134–144)
WBC # BLD AUTO: 9.2 X10E3/UL (ref 3.4–10.8)

## (undated) DEVICE — TUBING SUCTION 5MM X 12 FT

## (undated) DEVICE — GLOVE INDICATOR PI UNDERGLOVE SZ 8 BLUE

## (undated) DEVICE — SPONGE STICK WITH PVP-I: Brand: KENDALL

## (undated) DEVICE — POOLE SUCTION HANDLE: Brand: CARDINAL HEALTH

## (undated) DEVICE — BASIC SINGLE BASIN 2-LF: Brand: MEDLINE INDUSTRIES, INC.

## (undated) DEVICE — NEEDLE 25G X 1 1/2

## (undated) DEVICE — SUT CHROMIC 2-0 CT-2 27 IN 883H

## (undated) DEVICE — TINCTURE OF BENZOIN SKIN PREP SPRAY IS A SKIN PREP SPRAY THAT ENHANCES THE ADHESION OF TAPE/APPLIANCE WHILE PROTECTING SENSITIVE SKIN FROM ADHESIVES AND BODY FLUIDS.: Brand: TINCTURE OF BENZOIN SPRAY 4OZ US

## (undated) DEVICE — INTENDED FOR TISSUE SEPARATION, AND OTHER PROCEDURES THAT REQUIRE A SHARP SURGICAL BLADE TO PUNCTURE OR CUT.: Brand: BARD-PARKER SAFETY BLADES SIZE 10, STERILE

## (undated) DEVICE — STERILE LUBRICATING JELLY, TUBE: Brand: HR LUBRICATING JELLY

## (undated) DEVICE — 3M™ DURAPORE™ SURGICAL TAPE 1538-3, 3 INCH X 10 YARD (7,5CM X 9,1M), 4 ROLLS/BOX: Brand: 3M™ DURAPORE™

## (undated) DEVICE — GLOVE SRG BIOGEL 7.5

## (undated) DEVICE — HYDROGEN PEROXIDE 3 PCT 4OZ

## (undated) DEVICE — GAUZE SPONGES,16 PLY: Brand: CURITY

## (undated) DEVICE — DECANTER: Brand: UNBRANDED

## (undated) DEVICE — BETHLEHEM UNIVERSAL MINOR GEN: Brand: CARDINAL HEALTH